# Patient Record
Sex: MALE | Race: BLACK OR AFRICAN AMERICAN | NOT HISPANIC OR LATINO | Employment: UNEMPLOYED | URBAN - METROPOLITAN AREA
[De-identification: names, ages, dates, MRNs, and addresses within clinical notes are randomized per-mention and may not be internally consistent; named-entity substitution may affect disease eponyms.]

---

## 2017-11-24 ENCOUNTER — HOSPITAL ENCOUNTER (EMERGENCY)
Facility: HOSPITAL | Age: 37
Discharge: HOME | End: 2017-11-24
Attending: FAMILY MEDICINE

## 2017-11-24 VITALS
TEMPERATURE: 98 F | DIASTOLIC BLOOD PRESSURE: 97 MMHG | HEIGHT: 69 IN | HEART RATE: 98 BPM | OXYGEN SATURATION: 97 % | RESPIRATION RATE: 16 BRPM | BODY MASS INDEX: 31.1 KG/M2 | WEIGHT: 210 LBS | SYSTOLIC BLOOD PRESSURE: 140 MMHG

## 2017-11-24 DIAGNOSIS — M79.652 LEFT THIGH PAIN: Primary | ICD-10-CM

## 2017-11-24 DIAGNOSIS — M79.606 LEG PAIN: ICD-10-CM

## 2017-11-24 LAB
ALBUMIN SERPL BCP-MCNC: 4.3 G/DL
ALP SERPL-CCNC: 67 U/L
ALT SERPL W/O P-5'-P-CCNC: 40 U/L
AMPHET+METHAMPHET UR QL: NEGATIVE
ANION GAP SERPL CALC-SCNC: 13 MMOL/L
APTT BLDCRRT: <21 SEC
AST SERPL-CCNC: 59 U/L
BACTERIA #/AREA URNS AUTO: ABNORMAL /HPF
BARBITURATES UR QL SCN>200 NG/ML: NEGATIVE
BASOPHILS # BLD AUTO: 0.04 K/UL
BASOPHILS NFR BLD: 1.2 %
BENZODIAZ UR QL SCN>200 NG/ML: NEGATIVE
BILIRUB SERPL-MCNC: 1.3 MG/DL
BILIRUB UR QL STRIP: NEGATIVE
BUN SERPL-MCNC: 13 MG/DL
BZE UR QL SCN: NEGATIVE
CALCIUM SERPL-MCNC: 8.8 MG/DL
CANNABINOIDS UR QL SCN: NEGATIVE
CHLORIDE SERPL-SCNC: 112 MMOL/L
CLARITY UR REFRACT.AUTO: CLEAR
CO2 SERPL-SCNC: 23 MMOL/L
COLOR UR AUTO: YELLOW
CREAT SERPL-MCNC: 0.96 MG/DL
CREAT UR-MCNC: 312.8 MG/DL
D DIMER PPP FEU-MCNC: NORMAL NG/ML
DIFFERENTIAL METHOD: ABNORMAL
EOSINOPHIL # BLD AUTO: 0.1 K/UL
EOSINOPHIL NFR BLD: 3.9 %
ERYTHROCYTE [DISTWIDTH] IN BLOOD BY AUTOMATED COUNT: 13.5 %
EST. GFR  (AFRICAN AMERICAN): >60 ML/MIN/1.73 M^2
EST. GFR  (NON AFRICAN AMERICAN): >60 ML/MIN/1.73 M^2
GLUCOSE SERPL-MCNC: 87 MG/DL
GLUCOSE UR QL STRIP: NEGATIVE
HCT VFR BLD AUTO: 44.2 %
HGB BLD-MCNC: 14.8 G/DL
HGB UR QL STRIP: NEGATIVE
HYALINE CASTS UR QL AUTO: 0 /LPF
INR PPP: 1
KETONES UR QL STRIP: NEGATIVE
LEUKOCYTE ESTERASE UR QL STRIP: NEGATIVE
LYMPHOCYTES # BLD AUTO: 2.1 K/UL
LYMPHOCYTES NFR BLD: 62.7 %
MCH RBC QN AUTO: 31.5 PG
MCHC RBC AUTO-ENTMCNC: 33.5 G/DL
MCV RBC AUTO: 94 FL
METHADONE UR QL SCN>300 NG/ML: NEGATIVE
MICROSCOPIC COMMENT: ABNORMAL
MONOCYTES # BLD AUTO: 0.3 K/UL
MONOCYTES NFR BLD: 8.1 %
NEUTROPHILS # BLD AUTO: 0.8 K/UL
NEUTROPHILS NFR BLD: 23.5 %
NITRITE UR QL STRIP: NEGATIVE
OPIATES UR QL SCN: NEGATIVE
PCP UR QL SCN>25 NG/ML: NEGATIVE
PH UR STRIP: 5 [PH] (ref 5–8)
PLATELET # BLD AUTO: 35 K/UL
PLATELET BLD QL SMEAR: ABNORMAL
PMV BLD AUTO: 11.2 FL
POTASSIUM SERPL-SCNC: 5.5 MMOL/L
PROT SERPL-MCNC: 8 G/DL
PROT UR QL STRIP: ABNORMAL
PROTHROMBIN TIME: 11.3 SEC
RBC # BLD AUTO: 4.7 M/UL
RBC #/AREA URNS AUTO: 0 /HPF (ref 0–4)
SODIUM SERPL-SCNC: 148 MMOL/L
SP GR UR STRIP: 1.02 (ref 1–1.03)
TOXICOLOGY INFORMATION: NORMAL
URN SPEC COLLECT METH UR: ABNORMAL
UROBILINOGEN UR STRIP-ACNC: NEGATIVE EU/DL
WBC # BLD AUTO: 3.32 K/UL
WBC #/AREA URNS AUTO: 6 /HPF (ref 0–5)

## 2017-11-24 PROCEDURE — 85379 FIBRIN DEGRADATION QUANT: CPT

## 2017-11-24 PROCEDURE — 25000003 PHARM REV CODE 250: Performed by: FAMILY MEDICINE

## 2017-11-24 PROCEDURE — 85730 THROMBOPLASTIN TIME PARTIAL: CPT

## 2017-11-24 PROCEDURE — 85025 COMPLETE CBC W/AUTO DIFF WBC: CPT

## 2017-11-24 PROCEDURE — 81000 URINALYSIS NONAUTO W/SCOPE: CPT

## 2017-11-24 PROCEDURE — 99284 EMERGENCY DEPT VISIT MOD MDM: CPT

## 2017-11-24 PROCEDURE — 85610 PROTHROMBIN TIME: CPT

## 2017-11-24 PROCEDURE — 80053 COMPREHEN METABOLIC PANEL: CPT

## 2017-11-24 PROCEDURE — 80307 DRUG TEST PRSMV CHEM ANLYZR: CPT

## 2017-11-24 RX ORDER — KETOROLAC TROMETHAMINE 10 MG/1
10 TABLET, FILM COATED ORAL
Status: COMPLETED | OUTPATIENT
Start: 2017-11-24 | End: 2017-11-24

## 2017-11-24 RX ORDER — HYDROCODONE BITARTRATE AND ACETAMINOPHEN 5; 325 MG/1; MG/1
1 TABLET ORAL EVERY 8 HOURS PRN
Qty: 12 TABLET | Refills: 0 | Status: SHIPPED | OUTPATIENT
Start: 2017-11-24 | End: 2017-11-24 | Stop reason: ALTCHOICE

## 2017-11-24 RX ORDER — HYDROCODONE BITARTRATE AND ACETAMINOPHEN 10; 325 MG/1; MG/1
1 TABLET ORAL
Status: DISCONTINUED | OUTPATIENT
Start: 2017-11-24 | End: 2017-11-24 | Stop reason: HOSPADM

## 2017-11-24 RX ORDER — ACETAMINOPHEN AND CODEINE PHOSPHATE 300; 30 MG/1; MG/1
1 TABLET ORAL EVERY 6 HOURS PRN
Qty: 12 TABLET | Refills: 0 | Status: SHIPPED | OUTPATIENT
Start: 2017-11-24 | End: 2017-12-04

## 2017-11-24 RX ADMIN — KETOROLAC TROMETHAMINE 10 MG: 10 TABLET, FILM COATED ORAL at 05:11

## 2017-11-24 NOTE — ED PROVIDER NOTES
Encounter Date: 11/24/2017       History     Chief Complaint   Patient presents with    Leg Pain     left leg pain no injury. Pt states that symptoms have been going on x 1 month. He has a hx of blood clot and filter      Patient complains of left leg pain since about a month.  She denies any injury.  Pain is radiating down to his lower leg.  There is no swelling.  There is no erythema.  Patient claims that he has history of blood clots and he is placed on filter.  He has a primary care physician seen about 3 weeks ago.  History of blood pressure noncompliant to his medication.      The history is provided by the patient.     Review of patient's allergies indicates:  No Known Allergies  Past Medical History:   Diagnosis Date    DVT (deep venous thrombosis)     Hypertension      Past Surgical History:   Procedure Laterality Date    AORTIC ARCH REPAIR      DIAPHRAGM SURGERY       Family History   Problem Relation Age of Onset    Hypertension Mother     Diabetes Mother     Cancer Father      Social History   Substance Use Topics    Smoking status: Former Smoker     Packs/day: 0.50     Types: Cigarettes    Smokeless tobacco: Never Used    Alcohol use Yes      Comment: occ     Review of Systems   Constitutional: Negative for activity change, appetite change and fever.   HENT: Negative for congestion, ear pain, sinus pain and sore throat.    Eyes: Negative for pain, discharge and itching.   Respiratory: Negative for cough, choking, chest tightness, shortness of breath and wheezing.    Cardiovascular: Negative for chest pain.   Gastrointestinal: Negative for abdominal distention, abdominal pain, nausea and vomiting.   Genitourinary: Negative for flank pain and frequency.   Musculoskeletal: Negative for back pain, gait problem, neck pain and neck stiffness.   Skin: Negative for rash.   Neurological: Negative for dizziness, light-headedness, numbness and headaches.   Psychiatric/Behavioral: Negative for confusion.  The patient is not nervous/anxious.    All other systems reviewed and are negative.      Physical Exam     Initial Vitals [11/24/17 1423]   BP Pulse Resp Temp SpO2   (!) 145/109 108 18 98 °F (36.7 °C) 96 %      MAP       121         Physical Exam    Nursing note and vitals reviewed.  Constitutional: He appears well-developed and well-nourished.   HENT:   Head: Normocephalic.   Right Ear: External ear normal.   Left Ear: External ear normal.   Nose: Nose normal.   Mouth/Throat: Oropharynx is clear and moist.   Eyes: Conjunctivae and EOM are normal. Pupils are equal, round, and reactive to light.   Neck: Normal range of motion. Neck supple.   Cardiovascular: Normal rate, regular rhythm, normal heart sounds and intact distal pulses. Exam reveals no friction rub.    No murmur heard.  Pulmonary/Chest: Breath sounds normal. No respiratory distress. He has no wheezes. He has no rhonchi. He has no rales. He exhibits no tenderness.   Abdominal: Soft. Bowel sounds are normal. He exhibits no distension. There is no tenderness. There is no guarding.   Musculoskeletal: Normal range of motion.        Left upper leg: He exhibits tenderness. He exhibits no bony tenderness, no swelling, no edema, no deformity and no laceration.        Legs:  Neurological: He is alert and oriented to person, place, and time. He has normal strength and normal reflexes. He displays normal reflexes. No cranial nerve deficit or sensory deficit.   Skin: Skin is warm. Capillary refill takes less than 2 seconds. No rash noted.         ED Course   Procedures  Labs Reviewed - No data to display          Medical Decision Making:   Initial Assessment:   Patient presents with chronic pain in his left posterior thigh since last 1 month.  Patient says he has history of blood clots and has been treated in the past with filter.  He is worried if there are any clot in his leg.  Differential Diagnosis:   Musculoskeletal pain, sciatica, DVT, abscess.  ED  Management:  Patient pain looks like musculoskeletal.  This ultrasound did not show any acute DVT.  Patient is notified about these symptoms and is given pain medication and advised to follow up PCP for further pain if persist.                   ED Course      Clinical Impression:   The primary encounter diagnosis was Left thigh pain. A diagnosis of Leg pain was also pertinent to this visit.    Disposition:   Disposition: Discharged  Condition: Leonidas Samayoa MD  11/24/17 2596

## 2018-05-01 PROCEDURE — 99284 EMERGENCY DEPT VISIT MOD MDM: CPT | Mod: 25

## 2018-05-01 PROCEDURE — 93005 ELECTROCARDIOGRAM TRACING: CPT

## 2018-05-01 PROCEDURE — 99283 EMERGENCY DEPT VISIT LOW MDM: CPT | Mod: ,,, | Performed by: PHYSICIAN ASSISTANT

## 2018-05-01 PROCEDURE — 93010 ELECTROCARDIOGRAM REPORT: CPT | Mod: ,,, | Performed by: INTERNAL MEDICINE

## 2018-05-02 ENCOUNTER — HOSPITAL ENCOUNTER (EMERGENCY)
Facility: HOSPITAL | Age: 38
Discharge: HOME | End: 2018-05-02
Attending: EMERGENCY MEDICINE
Payer: MEDICAID

## 2018-05-02 VITALS
RESPIRATION RATE: 43 BRPM | HEIGHT: 68 IN | WEIGHT: 195 LBS | SYSTOLIC BLOOD PRESSURE: 136 MMHG | HEART RATE: 92 BPM | DIASTOLIC BLOOD PRESSURE: 88 MMHG | OXYGEN SATURATION: 97 % | BODY MASS INDEX: 29.55 KG/M2 | TEMPERATURE: 99 F

## 2018-05-02 DIAGNOSIS — M25.571 RIGHT ANKLE PAIN: ICD-10-CM

## 2018-05-02 DIAGNOSIS — M79.606 LEG PAIN: ICD-10-CM

## 2018-05-02 PROCEDURE — 25000003 PHARM REV CODE 250: Performed by: PHYSICIAN ASSISTANT

## 2018-05-02 RX ORDER — NAPROXEN 500 MG/1
500 TABLET ORAL
Status: COMPLETED | OUTPATIENT
Start: 2018-05-02 | End: 2018-05-02

## 2018-05-02 RX ORDER — NAPROXEN 500 MG/1
500 TABLET ORAL 2 TIMES DAILY WITH MEALS
Qty: 20 TABLET | Refills: 0 | Status: SHIPPED | OUTPATIENT
Start: 2018-05-02 | End: 2018-05-21 | Stop reason: SDUPTHER

## 2018-05-02 RX ADMIN — NAPROXEN 500 MG: 500 TABLET ORAL at 03:05
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
## 2018-05-02 NOTE — DISCHARGE INSTRUCTIONS
Follow-up as scheduled next week at [facility]  Use naproxen twice a day as needed for pain  Return to the emergency department for any new symptoms      Our goal in the emergency department is to always give you outstanding care and exceptional service. You may receive a survey by mail or e-mail in the next week regarding your experience in our ED. We would greatly appreciate your completing and returning the survey. Your feedback provides us with a way to recognize our staff who give very good care and it helps us learn how to improve when your experience was below our aspiration of excellence.

## 2018-05-07 ENCOUNTER — OFFICE VISIT (OUTPATIENT)
Dept: FAMILY MEDICINE | Facility: HOSPITAL | Age: 38
End: 2018-05-07
Attending: FAMILY MEDICINE
Payer: MEDICAID

## 2018-05-07 VITALS
DIASTOLIC BLOOD PRESSURE: 79 MMHG | WEIGHT: 186.94 LBS | HEART RATE: 91 BPM | SYSTOLIC BLOOD PRESSURE: 114 MMHG | BODY MASS INDEX: 27.69 KG/M2 | HEIGHT: 69 IN

## 2018-05-07 DIAGNOSIS — I71.9 PSEUDOANEURYSM OF AORTA: ICD-10-CM

## 2018-05-07 DIAGNOSIS — R47.01 EXPRESSIVE APHASIA: ICD-10-CM

## 2018-05-07 DIAGNOSIS — I63.9 CEREBROVASCULAR ACCIDENT (CVA), UNSPECIFIED MECHANISM: Primary | ICD-10-CM

## 2018-05-07 DIAGNOSIS — J30.9 ALLERGIC RHINITIS, UNSPECIFIED SEASONALITY, UNSPECIFIED TRIGGER: ICD-10-CM

## 2018-05-07 PROBLEM — I69.320 CVA, OLD, APHASIA: Status: ACTIVE | Noted: 2018-05-07

## 2018-05-07 PROBLEM — I71.20 THORACIC AORTIC ANEURYSM: Status: ACTIVE | Noted: 2018-05-07

## 2018-05-07 PROCEDURE — 99215 OFFICE O/P EST HI 40 MIN: CPT | Performed by: FAMILY MEDICINE

## 2018-05-07 RX ORDER — FOLIC ACID 1 MG/1
1 TABLET ORAL
COMMUNITY
Start: 2018-05-01 | End: 2018-05-21 | Stop reason: SDUPTHER

## 2018-05-07 RX ORDER — LISINOPRIL AND HYDROCHLOROTHIAZIDE 12.5; 2 MG/1; MG/1
TABLET ORAL
COMMUNITY
End: 2018-05-07

## 2018-05-07 RX ORDER — AMLODIPINE BESYLATE 5 MG/1
5 TABLET ORAL
COMMUNITY
Start: 2018-05-01 | End: 2018-05-21 | Stop reason: SDUPTHER

## 2018-05-07 RX ORDER — FLUTICASONE PROPIONATE 50 MCG
1 SPRAY, SUSPENSION (ML) NASAL DAILY
Qty: 1 BOTTLE | Refills: 0 | Status: SHIPPED | OUTPATIENT
Start: 2018-05-07 | End: 2019-03-15 | Stop reason: SDUPTHER

## 2018-05-07 RX ORDER — METOPROLOL TARTRATE 50 MG/1
50 TABLET ORAL 3 TIMES DAILY
COMMUNITY
Start: 2018-04-30 | End: 2018-05-21 | Stop reason: SDUPTHER

## 2018-05-07 RX ORDER — ASPIRIN 81 MG/1
81 TABLET ORAL
COMMUNITY
End: 2018-05-21 | Stop reason: SDUPTHER

## 2018-05-07 RX ORDER — ATORVASTATIN CALCIUM 80 MG/1
80 TABLET, FILM COATED ORAL
COMMUNITY
Start: 2018-04-30 | End: 2018-05-21 | Stop reason: SDUPTHER

## 2018-05-07 RX ORDER — CLOPIDOGREL BISULFATE 75 MG/1
75 TABLET ORAL
COMMUNITY
Start: 2018-05-01 | End: 2018-05-21 | Stop reason: SDUPTHER
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
## 2018-05-07 NOTE — PROGRESS NOTES
I have reviewed the notes, assessments, and/or procedures performed, I concur with her/his documentation of Noy Bourne.

## 2018-05-10 ENCOUNTER — CLINICAL SUPPORT (OUTPATIENT)
Dept: REHABILITATION | Facility: HOSPITAL | Age: 38
End: 2018-05-10
Attending: FAMILY MEDICINE
Payer: MEDICAID

## 2018-05-10 DIAGNOSIS — R47.01 APHASIA: ICD-10-CM

## 2018-05-10 PROCEDURE — 92523 SPEECH SOUND LANG COMPREHEN: CPT | Mod: PN

## 2018-05-10 NOTE — PLAN OF CARE
Outpatient Neurological Rehabilitation  Speech and Language Therapy Evaluation    Date: 5/10/2018   Start Time:  0800  Stop Time:  0845      UNTIMED  Procedure Min.   Eval of speech sound production with comprehension and expression 45 minutes         Total Minutes: 45  Total Untimed Units: 1  Charges Billed/# of units: 1    Visit #:  1  Visits Authorized: 50  Date of Evaluation:  5/10/18  Authorization Period: May 7, 2018 to December 31, 2018  Plan of Care Expiration:    5/10/18 to 7/10/18    History   Onset Date:  April 2018  Primary Diagnosis:  CVA  Treatment Diagnosis:    Encounter Diagnosis   Name Primary?    Aphasia       Referring Provider:  Dr. Ericka Rainey  Orders: speech therapy evaluate and treat  Current Medical History:  Noy Bourne  presents to the [facility] secondary to the diagnosis of CVA. He was found to have rupture of his pseudoaneurysm and underwent emergency surgery which was complicated by hypoxic respiratory failure and an acute stroke in April 2018 in [place], [place] where he was working. He was reportedly discharged on 4/30/18.    Past Medical History:   Past Medical History:   Diagnosis Date    DVT (deep venous thrombosis)     Hypertension      Precautions:  communication  Prior Therapy:  One previous speech therapy session reported in the hospital in [place], [place].  Environmental Concerns/Cultural/Spiritual/Developmental/Educational Needs: none at this time    Subjective   Pain:   0/10  Nutrition:  Regular and thin liquid diet  Prior Level of Function: Independent  Social History:  Pt lives with his wife in [place]. 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 Education level: 12th grade.   Signs of Abuse: No  Functional Deficits Leading to Referral/Nature of Injury:  Difficulty word finding, delayed processing  Patient Therapy Goals:  To improve word finding and communication    Objective   Auditory  "Comprehension:   Following simple 1 step directions with 50% acc    Reading Comprehension:   Able to read 1 out of 4 words presented for 25% acc.     Verbal Expression:   Repetition: 29% acc max A  Namin% acc  Automatic speech acts: max A unable to count to ten or recite/repeat days of the week. Pt counted 5, 10, 15, 20, 25, 30,35, 40, 45, 50.    Written Expression:   -Not addressed this session.     Cognition:   Behavior: alert and attentive to task; frustrated   Oriented to self and wife    Motor Speech/Fluency/Voice:    No significant dysarthria noted. No significant dysphonia noted. Fluency slightly impaired due to word finding difficulty. Pt and wife report speech and voice are at baseline.     Swallowing: Pt and wife deny coughing, throat clearing, changes in vocal quality while eating/drinking. Denies globus sensation. No significant concern at this time. Please note silent aspiration cannot be ruled out without an instrumental assessment.     Hearing: No hearing deficit reported.   ________________________________________________________________________________________________________    Attempted portions of the WAB. However, unable to complete due to severity and time constraint.  Western Aphasia Battery - Revised (WAB-R) was administered to evaluate the patient's receptive and expressive language function. The following results were revealed:  Yes/No questions: 48/60   Apraxia: 53/60    Note: Increased time to complete all activities. Pt often responds with "I don't know.  I don't understanding what you're saying."     Treatment   -Not addressed this session.     Assessment     Noy Bourne presents to [facility] s/p medical diagnosis of  CVA. Demonstrates impairments including limitations as described in the problem list. Positive prognostic factors include new onset and young age. Negative prognostic factors none.  He presents with moderate to severe receptive and " expressive aphasia c/b difficulty following simple 1 step commands, difficulty naming objects, difficulty answering yes/no questions, difficulty repeating words. Patient will benefit from skilled, outpatient neurological rehabilitation speech therapy.    Rehab Potential: good    Short Term Goals (4 weeks):   1. Pt will answer complex yes/no questions with 80% acc given min A to improve receptive language skills.   2. Pt will follow simple one step directions with 50% acc given min A to improve receptive language skills.   3. Pt will repeat one syllable words with 50% acc given min A.   4. Pt will name objects given binary choice with 50% acc given min A.   5. Pt will read common words with 50% acc given min A to improve auditory comprehension.  6. Pt will participate in automatic speech acts with 50% acc given min A.     Long Term Goals (8 weeks):   1. Pt will participate in yes/no questions, following commands, and reading words to improve receptive language skills for functionality in everyday environment.   2. Pt will participate in objects naming, automatic speech acts, and repetition to improve expressive language skills for functionality in everyday environment.     Education: POC was discussed with pt. Patient and family members expressed understanding.     Plan     Authorization Period: May 7, 2018 to December 31, 2018  Plan of Care Certification Period: 5/10/15 to 7/10/15    Recommended Treatment Plan:  Patient will participate in the [facility] program for speech therapy 3 times per week for 8 weeks to address his  Communication deficits, to educate patient and their family, and to participate in a home exercise program.    Other Recommendations: none at this time       Therapist's Name:   Yvonne Hedrick M.S.CCC-SLP  Speech Language Pathologist     Date: 5/10/2018     I certify the need for these services furnished under this plan of treatment and while under my  care.  ____________________________________ Physician/Referring Practitioner   Date of Signature

## 2018-05-14 ENCOUNTER — CLINICAL SUPPORT (OUTPATIENT)
Dept: REHABILITATION | Facility: HOSPITAL | Age: 38
End: 2018-05-14
Attending: FAMILY MEDICINE
Payer: MEDICAID

## 2018-05-14 DIAGNOSIS — R47.01 APHASIA: ICD-10-CM

## 2018-05-14 PROCEDURE — 92507 TX SP LANG VOICE COMM INDIV: CPT | Mod: PN

## 2018-05-15 NOTE — PROGRESS NOTES
Outpatient Neurological Rehabilitation   Speech and Language Therapy Daily Note  Date:  5/14/2018     Start Time:  1615  Stop Time:  1700    Name: Noy oBurne   MRN: [id number]     Therapy Diagnosis:   Encounter Diagnosis   Name Primary?    Aphasia    Physician: Ericka Rainey MD  Physician Orders: speech therapy evaluate and treat  Medical CVA    Visit #:  2  Visits Authorized: 50  Visits Cancelled: 0  Visits No Showed:0  Date of Evaluation:  5/10/18  Authorization Period: May 7, 2018 to December 31, 2018  Plan of Care Expiration:    5/10/18 to 7/10/18    G-CODE   2 /10      Precautions: Standard     History:   Current Medical History: Noy Bourne presents to the [facility] with moderately-markedly impaired language and cognitive skills secondary to the medical diagnosis of CVA.  He was found to have rupture of his pseudoaneurysm and underwent emergency surgery which was complicated by hypoxic respiratory failure and an acute stroke in April 2018 in [place], [place] where he was working at the time. He was reportedly discharged on 4/30/18 and traveled home to [place] the next day.    Subjective:   Pt reports:  Pt was compliant to home exercise program. Pt's wife reported they worked on the worksheets provided.  Pain Scale:  0/10 on VAS currently.   Pain Location: n/a    Objective:   TIMED  Procedure Min.                UNTIMED  Procedure Min.   Speech- Language- Voice Therapy    45 minutes           Total Minutes: 45  Total Timed Units: 0  Total Untimed Units: 1  Charges Billed/# of units: 1    Short Term Goals: (4 weeks) Current Progress:   1. Pt will answer complex yes/no questions with 80% acc given min A to improve receptive language skills.  -Simple yes/no questions x 13 with 100% acc  -Complex yes/no questions -Not addressed this session.      2. Pt will follow simple one step directions with 50% acc given min A to improve receptive language skills.  -1  step commands x 10 with 90% acc given 3 repetitions   3. Pt will repeat one syllable words with 50% acc given min A.  -repeating 1 syllable words x 20 with 80% acc Independently      4. Pt will name objects given binary choice with 50% acc given min A.  -named objects x 10 with 70% acc Independently (without binary choice); 90% acc min A (given a verbal cue)     5. Pt will read common words with 50% acc given min A to improve auditory comprehension. -read common words x 29 with 59% acc       6. Pt will participate in automatic speech acts with 50% acc given min A.    -counting 1-10 unable to repeat with cues  -days of the week -unable to repeat with cues       Additional:   -Read 3 word sentences x 5 with 20% acc Independently; 60% acc mod A  -Identified body parts by pointing x 12 with 42% acc Independently; 50% acc min A    Patient Education/Response:   Pt and wife educated on goals and plan of care and reported understanding.     Home Program: Matching objects to words worksheet and reading common words, [employer] workbooks, [employer]    Assessment:   Noy is progressing well towards his goals. Naming has improved already.  Slightly decreased time to find words noted. Improvement noted following simple 1 step directions. Improvement noted repeating words.  Current goals remain appropriate. Goals to be updated as necessary.     Pt prognosis is Good. Pt will continue to benefit from skilled outpatient speech and language therapy to address the deficits listed in the problem list on initial evaluation, provide pt/family education and to maximize pt's level of independence in the home and community environment.     Medical necessity is demonstrated by the following IMPAIRMENTS:  -severe receptive and expressive aphasia  -difficulty following simple 1 step commands  -difficulty naming objects  -difficulty answering yes/no questions  -difficulty repeating words  -difficulty expressing wants/needs  -difficulty  participating in conversation    Environmental Concerns/Cultural/Spiritual/Developmental/Educational Needs:  Pt's spiritual, cultural and educational needs considered and pt agreeable to plan of care and goals.    Plan:   Continue POC with focus on word finding and comprehension.   Updated POC due 7/10/18.    Yvonne Hedrick M.S.CCC-SLP  Speech Language Pathologist   5/14/2018

## 2018-05-16 ENCOUNTER — TELEPHONE (OUTPATIENT)
Dept: REHABILITATION | Facility: HOSPITAL | Age: 38
End: 2018-05-16

## 2018-05-17 ENCOUNTER — OFFICE VISIT (OUTPATIENT)
Dept: CARDIOLOGY | Facility: CLINIC | Age: 38
End: 2018-05-17
Payer: MEDICAID

## 2018-05-17 VITALS
HEART RATE: 71 BPM | SYSTOLIC BLOOD PRESSURE: 122 MMHG | WEIGHT: 189.94 LBS | OXYGEN SATURATION: 97 % | BODY MASS INDEX: 28.13 KG/M2 | HEIGHT: 69 IN | DIASTOLIC BLOOD PRESSURE: 92 MMHG

## 2018-05-17 DIAGNOSIS — Z86.718 HISTORY OF DVT OF LOWER EXTREMITY: ICD-10-CM

## 2018-05-17 DIAGNOSIS — I69.320 CVA, OLD, APHASIA: ICD-10-CM

## 2018-05-17 DIAGNOSIS — I71.9 PSEUDOANEURYSM OF AORTA: Primary | ICD-10-CM

## 2018-05-17 DIAGNOSIS — I10 ESSENTIAL HYPERTENSION: ICD-10-CM

## 2018-05-17 PROCEDURE — 99214 OFFICE O/P EST MOD 30 MIN: CPT | Mod: PBBFAC,PN | Performed by: INTERNAL MEDICINE

## 2018-05-17 PROCEDURE — 99205 OFFICE O/P NEW HI 60 MIN: CPT | Mod: S$PBB,,, | Performed by: INTERNAL MEDICINE

## 2018-05-17 PROCEDURE — 99999 PR PBB SHADOW E&M-EST. PATIENT-LVL IV: CPT | Mod: PBBFAC,,, | Performed by: INTERNAL MEDICINE

## 2018-05-17 NOTE — LETTER
May 18, 2018      Ericka Rainey MD  [address]  [facility] [address]           [facility]  [address]  [facility] [address]  Phone: [phone]  Fax: [phone]          Patient: Noy Bourne   MR Number: [id number]   YOB: 1980   Date of Visit: 5/17/2018       Dear Dr. Ericka Rainey:    Thank you for referring Noy Bourne to me for evaluation. Attached you will find relevant portions of my assessment and plan of care.    If you have questions, please do not hesitate to call me. I look forward to following Noy Bourne along with you.    Sincerely,    Anton Hickman MD PhD    Enclosure  CC:  No Recipients    If you would like to receive this communication electronically, please contact [email or web address] or [phone] to request more information on [email or web address] access.    For providers and/or their staff who would like to refer a patient to [facility], please contact us through our one-stop-shop provider referral line, [facility], at [phone].    If you feel you have received this communication in error or would no longer like to receive these types of communications, please e-mail [email or web address]
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
## 2018-05-21 ENCOUNTER — OFFICE VISIT (OUTPATIENT)
Dept: FAMILY MEDICINE | Facility: HOSPITAL | Age: 38
End: 2018-05-21
Attending: FAMILY MEDICINE
Payer: MEDICAID

## 2018-05-21 VITALS
DIASTOLIC BLOOD PRESSURE: 96 MMHG | HEIGHT: 69 IN | WEIGHT: 184.5 LBS | BODY MASS INDEX: 27.33 KG/M2 | SYSTOLIC BLOOD PRESSURE: 132 MMHG | HEART RATE: 70 BPM

## 2018-05-21 DIAGNOSIS — I63.9 CEREBROVASCULAR ACCIDENT (CVA), UNSPECIFIED MECHANISM: ICD-10-CM

## 2018-05-21 DIAGNOSIS — I10 HYPERTENSION, UNSPECIFIED TYPE: ICD-10-CM

## 2018-05-21 DIAGNOSIS — F32.A DEPRESSION, UNSPECIFIED DEPRESSION TYPE: ICD-10-CM

## 2018-05-21 DIAGNOSIS — F41.9 ANXIETY: Primary | ICD-10-CM

## 2018-05-21 DIAGNOSIS — M79.605 PAIN IN BOTH LOWER EXTREMITIES: ICD-10-CM

## 2018-05-21 DIAGNOSIS — M79.604 PAIN IN BOTH LOWER EXTREMITIES: ICD-10-CM

## 2018-05-21 DIAGNOSIS — I71.20 THORACIC AORTIC ANEURYSM WITHOUT RUPTURE: ICD-10-CM

## 2018-05-21 PROCEDURE — 99213 OFFICE O/P EST LOW 20 MIN: CPT | Performed by: FAMILY MEDICINE

## 2018-05-21 RX ORDER — ASPIRIN 81 MG/1
81 TABLET ORAL DAILY
Qty: 90 TABLET | Refills: 1 | Status: SHIPPED | OUTPATIENT
Start: 2018-05-21 | End: 2020-05-25 | Stop reason: SDUPTHER

## 2018-05-21 RX ORDER — CLOPIDOGREL BISULFATE 75 MG/1
75 TABLET ORAL DAILY
Qty: 90 TABLET | Refills: 0 | Status: SHIPPED | OUTPATIENT
Start: 2018-05-21 | End: 2018-09-21 | Stop reason: SDUPTHER

## 2018-05-21 RX ORDER — AMLODIPINE BESYLATE 5 MG/1
5 TABLET ORAL DAILY
Qty: 90 TABLET | Refills: 1 | Status: SHIPPED | OUTPATIENT
Start: 2018-05-21 | End: 2018-06-18 | Stop reason: ALTCHOICE

## 2018-05-21 RX ORDER — FOLIC ACID 1 MG/1
1 TABLET ORAL DAILY
Qty: 90 TABLET | Refills: 0 | Status: SHIPPED | OUTPATIENT
Start: 2018-05-21 | End: 2018-08-09 | Stop reason: SDUPTHER

## 2018-05-21 RX ORDER — ATORVASTATIN CALCIUM 80 MG/1
80 TABLET, FILM COATED ORAL DAILY
Qty: 90 TABLET | Refills: 0 | Status: SHIPPED | OUTPATIENT
Start: 2018-05-21 | End: 2018-09-21 | Stop reason: SDUPTHER

## 2018-05-21 RX ORDER — ESCITALOPRAM OXALATE 10 MG/1
10 TABLET ORAL DAILY
Qty: 30 TABLET | Refills: 0 | Status: SHIPPED | OUTPATIENT
Start: 2018-05-21 | End: 2019-03-15 | Stop reason: SDUPTHER

## 2018-05-21 RX ORDER — NAPROXEN 500 MG/1
500 TABLET ORAL DAILY PRN
Qty: 30 TABLET | Refills: 0 | Status: SHIPPED | OUTPATIENT
Start: 2018-05-21 | End: 2021-05-12

## 2018-05-21 RX ORDER — METOPROLOL TARTRATE 50 MG/1
50 TABLET ORAL 3 TIMES DAILY
Qty: 270 TABLET | Refills: 0 | Status: SHIPPED | OUTPATIENT
Start: 2018-05-21 | End: 2018-06-18 | Stop reason: ALTCHOICE

## 2018-05-21 NOTE — PROGRESS NOTES
I assume primary medical responsibility for this patient, I have reviewed the case history, findings, diagnosis and treatment plan with the resident and agree that the care is reasonable and necessary. This service has been performed by a resident without the presence of a teaching physician under the primary care exception  Marquita Rodgers  5/21/2018
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
## 2018-05-23 PROBLEM — Z86.718 HISTORY OF DVT OF LOWER EXTREMITY: Status: ACTIVE | Noted: 2018-05-23

## 2018-05-23 PROBLEM — I10 ESSENTIAL HYPERTENSION: Status: ACTIVE | Noted: 2018-05-23

## 2018-05-24 ENCOUNTER — CLINICAL SUPPORT (OUTPATIENT)
Dept: REHABILITATION | Facility: HOSPITAL | Age: 38
End: 2018-05-24
Attending: FAMILY MEDICINE
Payer: MEDICAID

## 2018-05-24 DIAGNOSIS — R47.01 APHASIA: ICD-10-CM

## 2018-05-24 PROCEDURE — 92507 TX SP LANG VOICE COMM INDIV: CPT | Mod: PN

## 2018-05-24 NOTE — PROGRESS NOTES
Outpatient Neurological Rehabilitation   Speech and Language Therapy Daily Note  Date:  5/24/2018     Start Time:  1615  Stop Time:  1700    Name: Noy Bourne   MRN: [id number]     Therapy Diagnosis:   Encounter Diagnosis   Name Primary?    Aphasia    Physician: Ericka Rainey MD  Physician Orders: speech therapy evaluate and treat  Medical CVA    Visit #:  3  Visits Authorized: 50  Visits Cancelled: 0  Visits No Showed:0  Date of Evaluation:  5/10/18  Authorization Period: May 7, 2018 to December 31, 2018  Plan of Care Expiration:    5/10/18 to 7/10/18    G-CODE   3 /10      Precautions: Standard     History:   Current Medical History: Noy Bourne presents to the [facility] with moderately-markedly impaired language and cognitive skills secondary to the medical diagnosis of CVA.  He was found to have rupture of his pseudoaneurysm and underwent emergency surgery which was complicated by hypoxic respiratory failure and an acute stroke in April 2018 in [place], [place] where he was working at the time. He was reportedly discharged on 4/30/18 and traveled home to [place] the next day.    Subjective:   Pt reports:  Pt was compliant to home exercise program. Pt and wife reported completing some of home exercise program.   Pain Scale:  0/10 on VAS currently.   Pain Location: n/a    Objective:   TIMED  Procedure Min.                UNTIMED  Procedure Min.   Speech- Language- Voice Therapy    45 minutes           Total Minutes: 45  Total Timed Units: 0  Total Untimed Units: 1  Charges Billed/# of units: 1    Short Term Goals: (4 weeks) Current Progress:   1. Pt will answer complex yes/no questions with 80% acc given min A to improve receptive language skills.  -Simple yes/no questions with min A  -Complex yes/no questions x 5 with max 60% acc      2. Pt will follow simple one step directions with 50% acc given min A to improve receptive language skills.  -1 step commands  x 10 with 90% acc given 1 repetitions on one occurrence; improvement noted   3. Pt will repeat one syllable words with 50% acc given min A.  -repeating 1 syllable words x 30 with 67% acc Independently; 80% acc min A     4. Pt will name objects given binary choice with 50% acc given min A.  -named objects x 20 with 50% acc Independently (without binary choice); 85% acc min A      5. Pt will read common words with 50% acc given min A to improve auditory comprehension. -read common words x 25 with 56% acc Independently; 64% acc min A       6. Pt will participate in automatic speech acts with 50% acc given min A.    -counting 1-10 Independently; significantly improved  -days of the week -able to repeat 3 out of 7       Additional:   -Read 3 word sentences x 12 with 50% acc Independently; 42% acc mod A  -Identified body parts by pointing x 12 with 50% acc Independently; 58% acc min A    Patient Education/Response:   Pt and wife educated on goals and plan of care and reported understanding.     Home Program: Matching objects to words worksheet and reading common words, [employer] workbooks, [employer]    Assessment:   Noy is progressing well towards his goals. Significantly improved counting one to ten. Naming continues to improve. Increase identifying body parts. Improvement noted following simple 1 step directions with less repetitions; quick responses. Current goals remain appropriate. Goals to be updated as necessary.     Pt prognosis is Good. Pt will continue to benefit from skilled outpatient speech and language therapy to address the deficits listed in the problem list on initial evaluation, provide pt/family education and to maximize pt's level of independence in the home and community environment.     Medical necessity is demonstrated by the following IMPAIRMENTS:  -severe receptive and expressive aphasia  -difficulty following simple 1 step commands  -difficulty naming objects  -difficulty answering yes/no  questions  -difficulty repeating words  -difficulty expressing wants/needs  -difficulty participating in conversation    Environmental Concerns/Cultural/Spiritual/Developmental/Educational Needs:  Pt's spiritual, cultural and educational needs considered and pt agreeable to plan of care and goals.    Plan:   Continue POC with focus on word finding and comprehension.   Updated POC due 7/10/18.    Yvonne Hedrick M.S.CCC-SLP  Speech Language Pathologist   5/24/2018

## 2018-05-28 ENCOUNTER — CLINICAL SUPPORT (OUTPATIENT)
Dept: REHABILITATION | Facility: HOSPITAL | Age: 38
End: 2018-05-28
Attending: FAMILY MEDICINE
Payer: MEDICAID

## 2018-05-28 DIAGNOSIS — R47.01 APHASIA: ICD-10-CM

## 2018-05-28 PROCEDURE — 92507 TX SP LANG VOICE COMM INDIV: CPT | Mod: PN

## 2018-05-28 NOTE — PROGRESS NOTES
Outpatient Neurological Rehabilitation   Speech and Language Therapy Daily Note  Date:  5/28/2018     Start Time:  0845  Stop Time:  0930    Name: Noy Bourne   MRN: [id number]     Therapy Diagnosis:   Encounter Diagnosis   Name Primary?    Aphasia    Physician: Ericka Rainey MD  Physician Orders: speech therapy evaluate and treat  Medical CVA    Visit #:  4  Visits Authorized: 50  Visits Cancelled: 0  Visits No Showed:0  Date of Evaluation:  5/10/18  Authorization Period: May 7, 2018 to December 31, 2018  Plan of Care Expiration:    5/10/18 to 7/10/18    G-CODE   4 /10      Precautions: Standard     History:   Current Medical History: Noy Bourne presents to the [facility] with moderately-markedly impaired language and cognitive skills secondary to the medical diagnosis of CVA.  He was found to have rupture of his pseudoaneurysm and underwent emergency surgery which was complicated by hypoxic respiratory failure and an acute stroke in April 2018 in [place], [place] where he was working at the time. He was reportedly discharged on 4/30/18 and traveled home to [place] the next day.    Subjective:   Pt reports:  Pt was compliant to home exercise program. Pt and wife reported completing some of home exercise program.   Pain Scale:  0/10 on VAS currently.   Pain Location: n/a    Objective:   TIMED  Procedure Min.                UNTIMED  Procedure Min.   Speech- Language- Voice Therapy    45 minutes           Total Minutes: 45  Total Timed Units: 0  Total Untimed Units: 1  Charges Billed/# of units: 1    Short Term Goals: (4 weeks) Current Progress:   1. Pt will answer complex yes/no questions with 80% acc given min A to improve receptive language skills.  -Simple yes/no questions with min A  -Complex yes/no questions -Not addressed this session.       2. Pt will follow simple one step directions with 50% acc given min A to improve receptive language skills.  -1 step  commands x 10 with 83% acc Independently; 92% acc min A   3. Pt will repeat one syllable words with 50% acc given min A.  -repeating 1 syllable words x 20 with 75% acc Independently; 85% acc min A  -blends difficult such as /sh/     4. Pt will name objects given binary choice with 50% acc given min A.  -named objects x 20 with 65% acc Independently (without binary choice); 85% acc min A      5. Pt will read common words with 50% acc given min A to improve auditory comprehension. -read common 1 syllable words x 21 with 86% acc Independently; 95% acc min A  -2 syllable words x 6 with 83% acc Independently      6. Pt will participate in automatic speech acts with 50% acc given min A.    -counting 1-20 Independently; significantly improved  -days of the week 7/7 given initial word cue     7. Pt will read 3 word sentences with 80% acc given min A.    -read 3 word sentences x 5 with 20% acc Independently; 80% acc min A  -read 4 word sentences x 5 with 0% acc Independently; 100% acc mod A     8. Pt will identify body parts by pointing with 80% acc given min A.    -x 12 with 67% acc given min A  -x 12 with 83% acc given min A     Additional:   -Orientation x 13 with 31% acc Independently; 69% acc min A  -Wrote words x 6 with 17% acc Independently; 50% acc mod A    Patient Education/Response:   Pt and wife educated on goals and plan of care and reported understanding.     Home Program: Matching objects to words worksheet and reading common words, [employer] workbooks, [employer]    Assessment:   Noy is progressing well towards his goals. Significantly improved counting one to ten. Naming continues to improve. Increase repeating 1 syllable words. Increase identifying body parts. Improvement noted following simple 1 step directions with less repetitions; quick responses. Increase reading common 1 syllable words.  Current goals remain appropriate. Goals to be updated as necessary.     Pt prognosis is Good. Pt will  continue to benefit from skilled outpatient speech and language therapy to address the deficits listed in the problem list on initial evaluation, provide pt/family education and to maximize pt's level of independence in the home and community environment.     Medical necessity is demonstrated by the following IMPAIRMENTS:  -severe receptive and expressive aphasia  -difficulty following simple 1 step commands  -difficulty naming objects  -difficulty answering yes/no questions  -difficulty repeating words  -difficulty expressing wants/needs  -difficulty participating in conversation    Environmental Concerns/Cultural/Spiritual/Developmental/Educational Needs:  Pt's spiritual, cultural and educational needs considered and pt agreeable to plan of care and goals.    Plan:   Continue POC with focus on word finding and comprehension.   Updated POC due 7/10/18.    Yvonne Hedrick M.S.CCC-SLP  Speech Language Pathologist   5/28/2018

## 2018-05-30 ENCOUNTER — CLINICAL SUPPORT (OUTPATIENT)
Dept: REHABILITATION | Facility: HOSPITAL | Age: 38
End: 2018-05-30
Attending: FAMILY MEDICINE
Payer: MEDICAID

## 2018-05-30 DIAGNOSIS — R47.01 APHASIA: ICD-10-CM

## 2018-05-30 PROCEDURE — 92507 TX SP LANG VOICE COMM INDIV: CPT | Mod: PN

## 2018-05-30 NOTE — PROGRESS NOTES
Outpatient Neurological Rehabilitation   Speech and Language Therapy Daily Note  Date:  5/30/2018     Start Time:  0855  Stop Time:  0930    Name: Noy Bourne   MRN: [id number]     Therapy Diagnosis:   Encounter Diagnosis   Name Primary?    Aphasia    Physician: Ericka Rainey MD  Physician Orders: speech therapy evaluate and treat  Medical CVA    Visit #:  5  Visits Authorized: 50  Visits Cancelled: 0  Visits No Showed:0  Date of Evaluation:  5/10/18  Authorization Period: May 7, 2018 to December 31, 2018  Plan of Care Expiration:    5/10/18 to 7/10/18    G-CODE   5 /10      Precautions: Standard     History:   Current Medical History: Noy Bourne presents to the [facility] with moderately-markedly impaired language and cognitive skills secondary to the medical diagnosis of CVA.  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 He was reportedly discharged on 4/30/18 and traveled home to [place] the next day.    Subjective:   Pt reports:  Pt was partially compliant to home exercise program.   Pain Scale:  0/10 on VAS currently.   Pain Location: n/a    Objective:   TIMED  Procedure Min.                UNTIMED  Procedure Min.   Speech- Language- Voice Therapy    35 minutes             Total Minutes: 35  Total Timed Units: 0  Total Untimed Units: 1  Charges Billed/# of units: 1    Short Term Goals: (4 weeks) Current Progress:   1. Pt will answer complex yes/no questions with 80% acc given min A to improve receptive language skills.  -Simple yes/no questions with min A  -Complex yes/no questions -Not addressed this session.       2. Pt will follow simple one step directions with 50% acc given min A to improve receptive language skills.  --Not addressed this session.   GOAL MET x 1 session      3. Pt will repeat one syllable words with 50% acc given min A.  -repeating 1 syllable words x 20 with 85% acc min A; 100% acc mod A  -blends difficult such as /sh/     4. Pt will name objects given binary choice with 50% acc given min A.  -named objects x 20 with 40% acc Independently (without binary choice); 90% acc min A      5. Pt will read common words with 50% acc given min A to improve auditory comprehension. -read common 1 syllable words x 21 with 86% acc Independently  -2 syllable words x 6 with 83% acc Independently      6. Pt will participate in automatic speech acts with 50% acc given min A.    -counting 1-20 Independently; significantly improved  -days of the week 7/7 given initial word cue  -months 11/12 min A     7. Pt will read 3 word sentences with 80% acc given min A.    -read 3 word sentences x 10 with 70% acc Independently; 80% acc min A  -read 4 word sentences -Not addressed this session.      8. Pt will identify body parts by pointing with 80% acc given min A.    -x 12 with 58% acc given min A  -x 12 with 75% acc given min A     Additional:   -Written orientation given visual with min A.  -Wrote words -Not addressed this session.     Patient Education/Response:   Pt and wife educated on goals and plan of care and reported understanding.     Home Program: Matching objects to words worksheet and reading common words, [employer] workbooks, [employer]    Assessment:   Noy is progressing well towards his goals. Significantly improved counting one to ten. Decreased accuracy naming objects Independently. Increase repeating 1 syllable words. Increase identifying body parts.  Consistent accuracy reading common 1 syllable words. Increase reading 3 word sentences. Decrease accuracy naming body parts. Current goals remain appropriate. Goals to be updated as necessary.     Pt prognosis is Good. Pt will continue to benefit from skilled outpatient speech and language therapy to address the deficits  listed in the problem list on initial evaluation, provide pt/family education and to maximize pt's level of independence in the home and community environment.     Medical necessity is demonstrated by the following IMPAIRMENTS:  -severe receptive and expressive aphasia  -difficulty following simple 1 step commands  -difficulty naming objects  -difficulty answering yes/no questions  -difficulty repeating words  -difficulty expressing wants/needs  -difficulty participating in conversation    Environmental Concerns/Cultural/Spiritual/Developmental/Educational Needs:  Pt's spiritual, cultural and educational needs considered and pt agreeable to plan of care and goals.    Plan:   Continue POC with focus on word finding and comprehension.   Updated POC due 7/10/18.    Yvonne Hedrick M.S.CCC-SLP  Speech Language Pathologist   5/30/2018

## 2018-06-06 ENCOUNTER — CLINICAL SUPPORT (OUTPATIENT)
Dept: REHABILITATION | Facility: HOSPITAL | Age: 38
End: 2018-06-06
Attending: FAMILY MEDICINE
Payer: MEDICAID

## 2018-06-06 ENCOUNTER — TELEPHONE (OUTPATIENT)
Dept: CARDIOLOGY | Facility: CLINIC | Age: 38
End: 2018-06-06

## 2018-06-06 DIAGNOSIS — R47.01 APHASIA: ICD-10-CM

## 2018-06-06 PROCEDURE — 92507 TX SP LANG VOICE COMM INDIV: CPT | Mod: PN

## 2018-06-06 NOTE — PROGRESS NOTES
Outpatient Neurological Rehabilitation   Speech and Language Therapy Daily Note  Date:  6/6/2018     Start Time:  0900  Stop Time:  0930    Name: Noy Bourne   MRN: [id number]     Therapy Diagnosis:   Encounter Diagnosis   Name Primary?    Aphasia    Physician: Ericka Rainey MD  Physician Orders: speech therapy evaluate and treat  Medical CVA    Visit #:  6  Visits Authorized: 50  Visits Cancelled: 0  Visits No Showed:0  Date of Evaluation:  5/10/18  Authorization Period: May 7, 2018 to December 31, 2018  Plan of Care Expiration:    5/10/18 to 7/10/18    G-CODE   6 /10      Precautions: Standard     History:   Current Medical History: Noy Bourne presents to the [facility] with moderately-markedly impaired language and cognitive skills secondary to the medical diagnosis of CVA.  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 He was reportedly discharged on 4/30/18 and traveled home to [place] the next day.    Subjective:   Pt reports:  Pt pleasant.    Pain Scale:  0/10 on VAS currently.   Pain Location: n/a    Objective:   TIMED  Procedure Min.                UNTIMED  Procedure Min.   Speech- Language- Voice Therapy    30 minutes             Total Minutes: 30  Total Timed Units: 0  Total Untimed Units: 1  Charges Billed/# of units: 1    Short Term Goals: (4 weeks) Current Progress:   1. Pt will answer complex yes/no questions with 80% acc given min A to improve receptive language skills.  -Moderate yes/no questions X 10 with 20% acc Independently; 70% min A  -Complex yes/no questions -Not addressed this session.       2. Pt will follow simple one step directions with 50% acc given min A to improve receptive language skills. GOAL MET 6/6/18 Follow simple one step directions X 10 with 70%  acc independently; 100% acc min A  GOAL MET x 2 session     3. Pt will repeat one syllable words with 50% acc given min A.  -repeating 1 syllable words x 20 with 90% independently; 95% acc min A     4. Pt will name objects given binary choice with 50% acc given min A.  -Not addressed this session     5. Pt will read common one-syllable words with 50% acc given min A to improve auditory comprehension. GOAL MET 6/6/18 -read common 1 syllable words x 20 with 85% acc Independently; 95% min A.  --GOAL MET X2 sessions     6. Pt will participate in automatic speech acts with 50% acc given min A.    -counting 1-20 Independently; significantly improved  -days of the week 7/7 given initial cue  -months 12/12 min A given two initial cue     7. Pt will read 3 word sentences with 80% acc given min A.    -read 3 word sentences x 11 with 18% acc Independently; 91% acc min A  -read 4 word sentences -Not addressed this session.      8. Pt will identify body parts by pointing with 80% acc given min A.    -x 12 with 75% acc independently   9. Pt will follow simple one-step directions with 90% acc independently to improve receptive language skills   New goal   10. Pt will read two-syllable words with 50% acc given min A to improve auditory comprehension New goal   Orientation X 9 with 44% acc independently; 89% with mod A.     Additional:   -Written orientation given visual with min A.- -Not addressed this session.  -Wrote words -Not addressed this session.     Goals Met:  2. Pt will follow simple one step directions with 50% acc given min A to improve receptive language skills. GOAL MET 6/6/18  5. Pt will read common words with 50% acc given min A to improve auditory comprehension. GOAL MET 6/6/18    Patient Education/Response:   Pt and wife educated on goals and plan of care and reported understanding.     Home Program: Matching objects to words worksheet and reading common words, [employer] workbooks, [employer]    Assessment:  "  Noy is progressing well towards his goals. Increase in repeating 1 syllable words. Decrease reading 3-word sentences independently.Pt met 2 goals for following simple one-step commands with min A and reading common word independently. Two new goals established. Current goals remain appropriate. Goals to be updated as necessary.     Pt prognosis is Good. Pt will continue to benefit from skilled outpatient speech and language therapy to address the deficits listed in the problem list on initial evaluation, provide pt/family education and to maximize pt's level of independence in the home and community environment.     Medical necessity is demonstrated by the following IMPAIRMENTS:  -severe receptive and expressive aphasia  -difficulty following simple 1 step commands  -difficulty naming objects  -difficulty answering yes/no questions  -difficulty repeating words  -difficulty expressing wants/needs  -difficulty participating in conversation    Environmental Concerns/Cultural/Spiritual/Developmental/Educational Needs:  Pt's spiritual, cultural and educational needs considered and pt agreeable to plan of care and goals.    Plan:   Continue POC with focus on word finding and comprehension.   Updated POC due 7/10/18.    Claudette Snyder,  Clinician    "I certify that I was present in the room directing the student and service delivery and guiding them using my skilled judgement. As the co-signing therapist, I have reviewed the student's documentation, and am responsible for the treatment, assessment and plan."     Yvonne Hedrick M.S.CCC-SLP  Speech Language Pathologist   6/6/2018   "

## 2018-06-06 NOTE — TELEPHONE ENCOUNTER
----- Message from [place] Phan sent at 6/6/2018 12:00 PM CDT -----  Contact: [phone]/wife/Madina  Patient's wife is requesting a call back. She has his ECHO results from [facility]. Does he still need to have new test? Please advise.

## 2018-06-13 ENCOUNTER — CLINICAL SUPPORT (OUTPATIENT)
Dept: REHABILITATION | Facility: HOSPITAL | Age: 38
End: 2018-06-13
Attending: FAMILY MEDICINE
Payer: MEDICAID

## 2018-06-13 DIAGNOSIS — R47.01 APHASIA: ICD-10-CM

## 2018-06-13 PROCEDURE — 92507 TX SP LANG VOICE COMM INDIV: CPT | Mod: PN

## 2018-06-13 NOTE — PROGRESS NOTES
Outpatient Neurological Rehabilitation   Speech and Language Therapy Daily Note  Date:  6/13/2018     Start Time:  0905  Stop Time:  0950    Name: Noy Bourne   MRN: [id number]     Therapy Diagnosis:   Encounter Diagnosis   Name Primary?    Aphasia    Physician: Ericka Rainey MD  Physician Orders: speech therapy evaluate and treat  Medical CVA    Visit #:  7  Visits Authorized: 50  Date of Evaluation:  5/10/18  Authorization Period: May 7, 2018 to December 31, 2018  Plan of Care Expiration:    5/10/18 to 7/10/18    G-CODE   7 /10      Precautions: Standard     History:   Current Medical History: Noy Bourne presents to the [facility] with moderately-markedly impaired language and cognitive skills secondary to the medical diagnosis of CVA.  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 He was reportedly discharged on 4/30/18 and traveled home to [place] the next day.    Subjective:   Pt reports:  Pt pleasant.    Pain Scale:  0/10 on VAS currently.   Pain Location: n/a    Objective:   TIMED  Procedure Min.                UNTIMED  Procedure Min.   Speech- Language- Voice Therapy    45 minutes             Total Minutes: 30  Total Timed Units: 0  Total Untimed Units: 1  Charges Billed/# of units: 1    Short Term Goals: (4 weeks) Current Progress:   1. Pt will answer complex yes/no questions with 80% acc given min A to improve receptive language skills.  -Moderate yes/no questions X 10 with 70% acc Independently  -Complex yes/no questions -Not addressed this session.       3. Pt will repeat one syllable words with 50% acc given min A. GOAL MET 6/13/18 -repeating 1 syllable words x 20 with 65% independently; 95% acc min A  GOAL MET x 2 sessions     4. Pt will name objects with 50% acc given min  A.  -60% acc Independently; 90% acc min A     6. Pt will participate in automatic speech acts with 50% acc given min A.   GOAL MET 6/13/18 -counting 1-20 Independently; significantly improved  -days of the week 7/7 given one initial cue  -months 12/12 min A given one initial cue     7. Pt will read 3 word sentences with 80% acc given min A.    -read 2-3 word sentences x 4 with 25% acc Independently; 100% acc min A       8. Pt will identify body parts by pointing with 80% acc given min A.    -x 20 with 55% acc independently; 80% acc min A   9. Pt will follow simple one-step directions with 90% acc independently to improve receptive language skills   -X 20 with 85% acc Independently with 2 repetitions   10. Pt will read two-syllable words with 50% acc given min A to improve auditory comprehension.   -X 15 with 53% acc Independently; 60% acc min A   11. Pt will repeat one syllable words with 70% acc Independently.   New goal       Goals Met:  2. Pt will follow simple one step directions with 50% acc given min A to improve receptive language skills. GOAL MET 6/6/18  5. Pt will read common one-syllable words with 50% acc given min A to improve auditory comprehension. GOAL MET 6/6/18  3. Pt will repeat one syllable words with 50% acc given min A. GOAL MET 6/13/18  4. Pt will name objects given binary choice with 50% acc given min A. GOAL MET  6. Pt will participate in automatic speech acts with 50% acc given min A.   GOAL MET 6/13/18    Patient Education/Response:   Pt and wife educated on goals and plan of care and reported understanding.     Home Program: Matching objects to words worksheet and reading multi-syllabic words, and short sentences [employer] workbooks, [employer]    Assessment:   Noy is progressing well towards his goals. Improvement noted answering moderate yes/no questions. Goal met for repeating one-syllable words. Goal met for automatic speech acts. Decrease naming body parts independently. One  "new goal established. Current goals remain appropriate. Goals to be updated as necessary.     Pt prognosis is Good. Pt will continue to benefit from skilled outpatient speech and language therapy to address the deficits listed in the problem list on initial evaluation, provide pt/family education and to maximize pt's level of independence in the home and community environment.     Medical necessity is demonstrated by the following IMPAIRMENTS:  -severe receptive and expressive aphasia  -difficulty following simple 1 step commands  -difficulty naming objects  -difficulty answering yes/no questions  -difficulty repeating words  -difficulty expressing wants/needs  -difficulty participating in conversation    Environmental Concerns/Cultural/Spiritual/Developmental/Educational Needs:  Pt's spiritual, cultural and educational needs considered and pt agreeable to plan of care and goals.    Plan:   Continue POC with focus on word finding and comprehension.   Updated POC due 7/10/18.    Claudette Snyder,  Clinician    "I certify that I was present in the room directing the student and service delivery and guiding them using my skilled judgement. As the co-signing therapist, I have reviewed the student's documentation, and am responsible for the treatment, assessment and plan."     Yvonne Hedrick M.S.CCC-SLP  Speech Language Pathologist   6/13/2018   "

## 2018-06-14 ENCOUNTER — TELEPHONE (OUTPATIENT)
Dept: REHABILITATION | Facility: HOSPITAL | Age: 38
End: 2018-06-14

## 2018-06-18 ENCOUNTER — OFFICE VISIT (OUTPATIENT)
Dept: CARDIOLOGY | Facility: CLINIC | Age: 38
End: 2018-06-18
Payer: MEDICAID

## 2018-06-18 VITALS
BODY MASS INDEX: 29.37 KG/M2 | HEIGHT: 69 IN | HEART RATE: 71 BPM | WEIGHT: 198.31 LBS | SYSTOLIC BLOOD PRESSURE: 124 MMHG | DIASTOLIC BLOOD PRESSURE: 90 MMHG | OXYGEN SATURATION: 98 %

## 2018-06-18 DIAGNOSIS — R94.30 CARDIAC LV EJECTION FRACTION OF 40-49%: ICD-10-CM

## 2018-06-18 DIAGNOSIS — I69.320 CVA, OLD, APHASIA: ICD-10-CM

## 2018-06-18 DIAGNOSIS — Z86.718 HISTORY OF DVT OF LOWER EXTREMITY: ICD-10-CM

## 2018-06-18 DIAGNOSIS — I10 ESSENTIAL HYPERTENSION: ICD-10-CM

## 2018-06-18 DIAGNOSIS — I71.9 PSEUDOANEURYSM OF AORTA: ICD-10-CM

## 2018-06-18 DIAGNOSIS — R47.01 APHASIA: Primary | ICD-10-CM

## 2018-06-18 PROCEDURE — 99215 OFFICE O/P EST HI 40 MIN: CPT | Mod: S$PBB,,, | Performed by: INTERNAL MEDICINE

## 2018-06-18 PROCEDURE — 99213 OFFICE O/P EST LOW 20 MIN: CPT | Mod: PBBFAC,PN | Performed by: INTERNAL MEDICINE

## 2018-06-18 PROCEDURE — 99999 PR PBB SHADOW E&M-EST. PATIENT-LVL III: CPT | Mod: PBBFAC,,, | Performed by: INTERNAL MEDICINE

## 2018-06-18 RX ORDER — METOPROLOL SUCCINATE 50 MG/1
50 TABLET, EXTENDED RELEASE ORAL DAILY
Qty: 90 TABLET | Refills: 3 | Status: SHIPPED | OUTPATIENT
Start: 2018-06-18 | End: 2018-09-21 | Stop reason: SDUPTHER

## 2018-06-18 RX ORDER — LISINOPRIL 10 MG/1
10 TABLET ORAL DAILY
Qty: 90 TABLET | Refills: 3 | Status: SHIPPED | OUTPATIENT
Start: 2018-06-18 | End: 2018-07-09 | Stop reason: ALTCHOICE
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
## 2018-06-18 NOTE — PATIENT INSTRUCTIONS
Assessment/Plan:  Noy Bourne is a 37 y.o. male with a past medical history of HTN, DVT (with IVC filter in place), thoracic aortic aneurysm s/p repair and CVA, who presents for a follow up appointment.     1. Thoracic aortic aneurysm s/p repair and CVA-  Mechanism of stroke unknown.  Check echo with bubble study to evaluate further.  Continue ASA, statin, plavix, beta blocker.    2. Depressed EF- Etiology unclear.  Check nuclear stress test to evaluate for ischemic etiology.  Change metoprolol tartrate to metoprolol succinate 50 mg daily.  Start lisinopril 5 mg daily and discontinue amlodipine.      2. HTN- Continue current antihypertensive medication regimen.  Pt to keep log of blood pressure/heart rate and bring in next visit for review.       Follow up in 2 weeks

## 2018-06-20 ENCOUNTER — CLINICAL SUPPORT (OUTPATIENT)
Dept: REHABILITATION | Facility: HOSPITAL | Age: 38
End: 2018-06-20
Attending: FAMILY MEDICINE
Payer: MEDICAID

## 2018-06-20 DIAGNOSIS — R47.01 APHASIA: ICD-10-CM

## 2018-06-20 PROCEDURE — 92507 TX SP LANG VOICE COMM INDIV: CPT | Mod: PN

## 2018-06-20 NOTE — PROGRESS NOTES
Outpatient Neurological Rehabilitation   Speech and Language Therapy Daily Note  Date:  6/20/2018     Start Time:  0851  Stop Time:  0931    Name: Noy Bourne   MRN: [id number]     Therapy Diagnosis:   Encounter Diagnosis   Name Primary?    Aphasia    Physician: Ericka Rainey MD  Physician Orders: speech therapy evaluate and treat  Medical CVA    Visit #:  8  Visits Authorized: 50  Date of Evaluation:  5/10/18  Authorization Period: May 7, 2018 to December 31, 2018  Plan of Care Expiration:    5/10/18 to 7/10/18    G-CODE   8/10      Precautions: Standard     History:   Current Medical History: Noy Bourne presents to the [facility] with moderately-markedly impaired language and cognitive skills secondary to the medical diagnosis of CVA.  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 He was reportedly discharged on 4/30/18 and traveled home to [place] the next day.    Subjective:   Pt reports:  Pt pleasant.    Pain Scale:  0/10 on VAS currently.   Pain Location: n/a    Objective:   TIMED  Procedure Min.                UNTIMED  Procedure Min.   Speech- Language- Voice Therapy    40 minutes             Total Minutes: 40  Total Timed Units: 0  Total Untimed Units: 1  Charges Billed/# of units: 1    Short Term Goals: (4 weeks) Current Progress:   1. Pt will answer complex yes/no questions with 80% acc given min A to improve receptive language skills.  -Moderate yes/no questions -Not addressed this session.  -Complex yes/no questions -Not addressed this session.       4. Pt will name objects with 50% acc given min A.  -47% acc Independently; 87% acc min A  GOAL MET X2     7. Pt will read 3 word sentences with 80% acc given min A.    -read 3 word sentences x 15 with 33% acc Independently; 73%  acc min A       8. Pt will identify body parts by pointing with 80% acc given min A.    -x 12 with 58% acc independently; 75% acc min A   9. Pt will follow simple one-step directions with 90% acc independently to improve receptive language skills   --Not addressed this session.     10. Pt will read two-syllable words with 50% acc given min A to improve auditory comprehension.   -X 22 with 50% acc Independently; 82% acc min A   11. Pt will repeat one syllable words with 70% acc Independently.   X 19 with 68% acc Independently; 95% acc min A    12. Pt will name objects with 60% acc. independently New goal     13. Pt will follow written directions with 50% acc given min A.  New goal  X 5 with mod A.        Goals Met:  2. Pt will follow simple one step directions with 50% acc given min A to improve receptive language skills. GOAL MET 6/6/18  5. Pt will read common one-syllable words with 50% acc given min A to improve auditory comprehension. GOAL MET 6/6/18  3. Pt will repeat one syllable words with 50% acc given min A. GOAL MET 6/13/18  4. Pt will name objects given binary choice with 50% acc given min A. GOAL MET  6. Pt will participate in automatic speech acts with 50% acc given min A.   GOAL MET 6/13/18  4. Pt will name objects with 50% acc given min A. GOAL MET 6/20/18    Patient Education/Response:   Pt and wife educated on goals and plan of care and reported understanding.     Home Program: Matching objects to words worksheet and reading multi-syllabic words, and short sentences [employer] workbooks, [employer]    Assessment:   Noy is progressing well towards his goals. Goal met for naming objects with 50% acc with min A. Increase in reading 3-word sentences independently. Increase in reading 2-syllable words. Increase naming body parts independently. Two new goals established. Current goals remain appropriate. Goals to be updated as necessary.     Pt prognosis is Good. Pt will continue to benefit from  "skilled outpatient speech and language therapy to address the deficits listed in the problem list on initial evaluation, provide pt/family education and to maximize pt's level of independence in the home and community environment.     Medical necessity is demonstrated by the following IMPAIRMENTS:  -severe receptive and expressive aphasia  -difficulty following simple 1 step commands  -difficulty naming objects  -difficulty answering yes/no questions  -difficulty repeating words; improvement noted  -difficulty expressing wants/needs; improvement noted  -difficulty participating in conversation    Environmental Concerns/Cultural/Spiritual/Developmental/Educational Needs:  Pt's spiritual, cultural and educational needs considered and pt agreeable to plan of care and goals.    Plan:   Continue POC with focus on word finding and comprehension.   Updated POC due 7/10/18.    Claudette Snyder,  Clinician    "I certify that I was present in the room directing the student and service delivery and guiding them using my skilled judgement. As the co-signing therapist, I have reviewed the student's documentation, and am responsible for the treatment, assessment and plan."     Yvonne Hedrick M.S.CCC-SLP  Speech Language Pathologist   6/20/2018   "

## 2018-06-25 ENCOUNTER — TELEPHONE (OUTPATIENT)
Dept: REHABILITATION | Facility: HOSPITAL | Age: 38
End: 2018-06-25

## 2018-06-26 ENCOUNTER — TELEPHONE (OUTPATIENT)
Dept: CARDIOLOGY | Facility: CLINIC | Age: 38
End: 2018-06-26

## 2018-06-26 NOTE — TELEPHONE ENCOUNTER
----- Message from Monica Rudd sent at 6/26/2018  9:43 AM CDT -----  Contact: [phone]'s wife Madina   Patients wife called in requesting to speak with you. Patient prefers to speak with a nurse. Please advise.

## 2018-06-27 ENCOUNTER — CLINICAL SUPPORT (OUTPATIENT)
Dept: REHABILITATION | Facility: HOSPITAL | Age: 38
End: 2018-06-27
Attending: FAMILY MEDICINE
Payer: MEDICAID

## 2018-06-27 DIAGNOSIS — R47.01 APHASIA: ICD-10-CM

## 2018-06-27 PROCEDURE — 92507 TX SP LANG VOICE COMM INDIV: CPT | Mod: PN

## 2018-06-27 NOTE — PROGRESS NOTES
Outpatient Neurological Rehabilitation   Speech and Language Therapy Daily Note  Date:  6/27/2018     Start Time:  0851  Stop Time:  0931    Name: Noy Bourne   MRN: [id number]     Therapy Diagnosis:   Encounter Diagnosis   Name Primary?    Aphasia      Physician: Ericka Rainey MD  Physician Orders: speech therapy evaluate and treat  Medical CVA    Visit #:  9  Visits Authorized: 50  Date of Evaluation:  5/10/18  Authorization Period: May 7, 2018 to December 31, 2018  Plan of Care Expiration:    5/10/18 to 7/10/18    G-CODE   9/10      Precautions: Standard     History:   Current Medical History: Noy Bourne presents to the [facility] with moderately-markedly impaired language and cognitive skills secondary to the medical diagnosis of CVA.  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 He was reportedly discharged on 4/30/18 and traveled home to [place] the next day.    Subjective:   Pt reports:  Pt pleasant.    Pain Scale:  0/10 on VAS currently.   Pain Location: n/a    Objective:   TIMED  Procedure Min.                UNTIMED  Procedure Min.   Speech- Language- Voice Therapy    40 minutes             Total Minutes: 40  Total Timed Units: 0  Total Untimed Units: 1  Charges Billed/# of units: 1    Short Term Goals: (4 weeks) Current Progress:   1. Pt will answer complex yes/no questions with 80% acc given min A to improve receptive language skills.  -Moderate yes/no questions -Not addressed this session.  -Complex yes/no questions -Not addressed this session.       7. Pt will read 3 word sentences with 80% acc given min A.    -read 3 word sentences x 20 with 80% acc Independently; 95% acc min A       8. Pt will identify body parts by pointing with 80% acc given min A.    -x 12 with 92%  acc independently; 100% acc min A   9. Pt will follow simple one-step directions with 90% acc independently to improve receptive language skills   - X 10 with 80% acc independently; 90% acc min A   10. Pt will read two-syllable words with 50% acc given min A to improve auditory comprehension.   -X 20 with 65% acc Independently; 100% acc min A    X 3 repetitions     11. Pt will repeat one syllable words with 70% acc Independently.   X 20 with 75% acc Independently; 95% acc min A     X 3 repetitions   12. Pt will name objects with 60% acc. independently X 15 with 73% acc independently; 93% acc min A    GOAL MET X 1 SESSION     13. Pt will follow written directions with 50% acc given min A.  X 5 with mod A.    14. Pt will name concrete category members x 10 in 60 seconds with 70% acc given min A to improve word finding Animals- X 6 with min A  States- X 3 min A     Goals Met:  2. Pt will follow simple one step directions with 50% acc given min A to improve receptive language skills. GOAL MET 6/6/18  5. Pt will read common one-syllable words with 50% acc given min A to improve auditory comprehension. GOAL MET 6/6/18  3. Pt will repeat one syllable words with 50% acc given min A. GOAL MET 6/13/18  4. Pt will name objects given binary choice with 50% acc given min A. GOAL MET  6. Pt will participate in automatic speech acts with 50% acc given min A.   GOAL MET 6/13/18  4. Pt will name objects with 50% acc given min A. GOAL MET 6/20/18    Patient Education/Response:   Pt and wife educated on goals and plan of care and reported understanding.     Home Program: Matching objects to words worksheet and reading multi-syllabic words, and short sentences [employer] workbooks, [employer]    Assessment:   Noy is progressing well towards his goals. Met goal X 1 for naming objects with 60% acc independently. Increase in reading 3-word sentences independently. Increase in reading 1- 2 word syllable words. Increase naming body  "parts independently. One new goal established. Current goals remain appropriate. Goals to be updated as necessary.     Pt prognosis is Good. Pt will continue to benefit from skilled outpatient speech and language therapy to address the deficits listed in the problem list on initial evaluation, provide pt/family education and to maximize pt's level of independence in the home and community environment.     Medical necessity is demonstrated by the following IMPAIRMENTS:  -severe receptive and expressive aphasia  -difficulty following simple 1 step commands  -difficulty naming objects  -difficulty answering yes/no questions  -difficulty repeating words; improvement noted  -difficulty expressing wants/needs; improvement noted  -difficulty participating in conversation    Environmental Concerns/Cultural/Spiritual/Developmental/Educational Needs:  Pt's spiritual, cultural and educational needs considered and pt agreeable to plan of care and goals.    Plan:   Continue POC with focus on word finding and comprehension.   Updated POC due 7/10/18.    Claudette Snyder,  Clinician    "I certify that I was present in the room directing the student and service delivery and guiding them using my skilled judgement. As the co-signing therapist, I have reviewed the student's documentation, and am responsible for the treatment, assessment and plan."     Yvonne Hedrick M.S.CCC-SLP  Speech Language Pathologist   6/27/2018   "

## 2018-07-02 ENCOUNTER — CLINICAL SUPPORT (OUTPATIENT)
Dept: REHABILITATION | Facility: HOSPITAL | Age: 38
End: 2018-07-02
Attending: FAMILY MEDICINE
Payer: MEDICAID

## 2018-07-02 DIAGNOSIS — R47.01 APHASIA: ICD-10-CM

## 2018-07-02 PROCEDURE — 92507 TX SP LANG VOICE COMM INDIV: CPT | Mod: PN

## 2018-07-02 NOTE — PROGRESS NOTES
Outpatient Neurological Rehabilitation   Speech and Language Therapy Daily Note  Date:  7/2/2018     Start Time:  0900  Stop Time:  0935    Name: Noy Bourne   MRN: [id number]     Therapy Diagnosis:   Encounter Diagnosis   Name Primary?    Aphasia      Physician: Ericka Rainey MD  Physician Orders: speech therapy evaluate and treat  Medical CVA    Visit #:  9  Visits Authorized: 50  Date of Evaluation:  5/10/18  Authorization Period: May 7, 2018 to December 31, 2018  Plan of Care Expiration: 5/10/18 to 7/10/18    G-CODE   9/10      Precautions: Standard     History:   Current Medical History: Noy Bourne presents to the [facility] with moderately-markedly impaired language and cognitive skills secondary to the medical diagnosis of CVA.  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 He was reportedly discharged on 4/30/18 and traveled home to [place] the next day.    Subjective:   Pt reports:  Pt pleasant. Pt did not return home program.   Pain Scale:  0/10 on VAS currently.   Pain Location: n/a    Objective:   TIMED  Procedure Min.                UNTIMED  Procedure Min.   Speech- Language- Voice Therapy    35 minutes             Total Minutes: 35  Total Timed Units: 0  Total Untimed Units: 1  Charges Billed/# of units: 1    Short Term Goals: (4 weeks) Current Progress:   1. Pt will answer complex yes/no questions with 80% acc given min A to improve receptive language skills.  -Moderate yes/no questions X 10 with 90% acc Independently; 100 % acc min A  X 4 repetitions  -Complex yes/no questions -Not addressed this session.       7. Pt will read 3 word sentences with 80% acc given min A.    -Not addressed this session.       8. Pt will identify body parts by pointing with 80% acc given min  A.    -Not addressed this session.     9. Pt will follow simple one-step directions with 90% acc independently to improve receptive language skills   -Not addressed this session.   10. Pt will read two-syllable words with 50% acc given min A to improve auditory comprehension.   -Not addressed this session.       11. Pt will repeat one syllable words with 70% acc Independently.   -Not addressed this session.     12. Pt will name objects with 60% acc. independently -Not addressed this session.     13. Pt will follow written directions with 50% acc given min A.  -Not addressed this session.     14. Pt will name concrete category members x 10 in 60 seconds with 70% acc given min A to improve word finding Animals- X 6 Independently in 60 seconds; X 15 min A untimed         Additional: Pt participated in a cross world puzzle with 100% acc given mod A to improve spelling and expressive language skills. This activity took a significant amount of time to complete.     Possible future goals: letter identification, writing letters    Goals Met:  2. Pt will follow simple one step directions with 50% acc given min A to improve receptive language skills. GOAL MET 6/6/18  5. Pt will read common one-syllable words with 50% acc given min A to improve auditory comprehension. GOAL MET 6/6/18  3. Pt will repeat one syllable words with 50% acc given min A. GOAL MET 6/13/18  4. Pt will name objects given binary choice with 50% acc given min A. GOAL MET  6. Pt will participate in automatic speech acts with 50% acc given min A.   GOAL MET 6/13/18  4. Pt will name objects with 50% acc given min A. GOAL MET 6/20/18    Patient Education/Response:   Pt educated on goals and plan of care and reported understanding.     Home Program: Complex yes/no questions from [employer], [employer]     Assessment:   Noy is progressing well towards his goals. Increase in naming concrete category members with min A. New task implemented for crossword  "puzzle. Current goals remain appropriate. Goals to be updated as necessary.     Pt prognosis is Good. Pt will continue to benefit from skilled outpatient speech and language therapy to address the deficits listed in the problem list on initial evaluation, provide pt/family education and to maximize pt's level of independence in the home and community environment.     Medical necessity is demonstrated by the following IMPAIRMENTS:  -severe receptive and expressive aphasia  -difficulty following simple 1 step commands  -difficulty naming objects  -difficulty answering yes/no questions  -difficulty repeating words; improvement noted  -difficulty expressing wants/needs; improvement noted  -difficulty participating in conversation    Environmental Concerns/Cultural/Spiritual/Developmental/Educational Needs:  Pt's spiritual, cultural and educational needs considered and pt agreeable to plan of care and goals.    Plan:   Continue POC with focus on word finding and comprehension.   Updated POC due 7/10/18.    Claudette Snyder,  Clinician    "I certify that I was present in the room directing the student and service delivery and guiding them using my skilled judgement. As the co-signing therapist, I have reviewed the student's documentation, and am responsible for the treatment, assessment and plan."     Yvonne Hedrick M.S.CCC-SLP  Speech Language Pathologist   7/2/2018   "

## 2018-07-06 ENCOUNTER — TELEPHONE (OUTPATIENT)
Dept: REHABILITATION | Facility: HOSPITAL | Age: 38
End: 2018-07-06

## 2018-07-09 ENCOUNTER — OFFICE VISIT (OUTPATIENT)
Dept: FAMILY MEDICINE | Facility: HOSPITAL | Age: 38
End: 2018-07-09
Payer: MEDICAID

## 2018-07-09 ENCOUNTER — OFFICE VISIT (OUTPATIENT)
Dept: CARDIOLOGY | Facility: CLINIC | Age: 38
End: 2018-07-09
Payer: MEDICAID

## 2018-07-09 VITALS
SYSTOLIC BLOOD PRESSURE: 152 MMHG | DIASTOLIC BLOOD PRESSURE: 112 MMHG | HEART RATE: 68 BPM | WEIGHT: 202.63 LBS | BODY MASS INDEX: 30.01 KG/M2 | HEIGHT: 69 IN | OXYGEN SATURATION: 97 %

## 2018-07-09 VITALS
SYSTOLIC BLOOD PRESSURE: 151 MMHG | DIASTOLIC BLOOD PRESSURE: 101 MMHG | HEIGHT: 69 IN | WEIGHT: 202.81 LBS | BODY MASS INDEX: 30.04 KG/M2 | HEART RATE: 76 BPM

## 2018-07-09 DIAGNOSIS — F41.9 ANXIETY DISORDER, UNSPECIFIED TYPE: ICD-10-CM

## 2018-07-09 DIAGNOSIS — I69.320 CVA, OLD, APHASIA: Primary | ICD-10-CM

## 2018-07-09 DIAGNOSIS — I10 ESSENTIAL HYPERTENSION: ICD-10-CM

## 2018-07-09 DIAGNOSIS — R94.30 CARDIAC LV EJECTION FRACTION OF 40-49%: ICD-10-CM

## 2018-07-09 PROCEDURE — 99999 PR PBB SHADOW E&M-EST. PATIENT-LVL IV: CPT | Mod: PBBFAC,,, | Performed by: INTERNAL MEDICINE

## 2018-07-09 PROCEDURE — 99215 OFFICE O/P EST HI 40 MIN: CPT | Mod: S$PBB,,, | Performed by: INTERNAL MEDICINE

## 2018-07-09 PROCEDURE — 99214 OFFICE O/P EST MOD 30 MIN: CPT | Mod: PBBFAC,PN | Performed by: INTERNAL MEDICINE

## 2018-07-09 PROCEDURE — 99213 OFFICE O/P EST LOW 20 MIN: CPT | Mod: 27 | Performed by: STUDENT IN AN ORGANIZED HEALTH CARE EDUCATION/TRAINING PROGRAM

## 2018-07-09 RX ORDER — VALSARTAN 40 MG/1
40 TABLET ORAL 2 TIMES DAILY
Qty: 60 TABLET | Refills: 11 | Status: SHIPPED | OUTPATIENT
Start: 2018-07-09 | End: 2018-08-09 | Stop reason: DRUGHIGH
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
## 2018-07-09 NOTE — PROGRESS NOTES
Subjective:       Patient ID: Noy Bourne is a 37 y.o. male.    Chief Complaint: Anxiety and Hypertension    Anxiety   Patient reports no chest pain, palpitations or shortness of breath.       Hypertension   Associated symptoms include anxiety. Pertinent negatives include no chest pain, headaches, palpitations or shortness of breath.      37 year old male presents for follow up of his anxiety. Patient was seen previously in clinic by Dr. Rainey and was prescribed lexapro for anxiety associated with recent CVA. However, since that time patient has been working with speech therapy with improvement in symptoms. Patient never started taking the medication and feels like he does not need to take it now. Patient evaluated by cardiology earlier today for his hypertension. Previously prescribed lisinopril causing the patient to cough, so his medication was changed to valsartan by cardiology. Patient requesting a work excuse note at this visit. No further complaints.      Review of Systems   Constitutional: Negative for fever.   HENT: Negative for sore throat.    Eyes: Negative for visual disturbance.   Respiratory: Positive for cough. Negative for chest tightness, shortness of breath and wheezing.    Cardiovascular: Negative for chest pain and palpitations.   Gastrointestinal: Negative for diarrhea and vomiting.   Skin: Negative for rash.   Neurological: Positive for speech difficulty. Negative for seizures, facial asymmetry, weakness, numbness and headaches.       Objective:      Vitals:    07/09/18 1459   BP: (!) 151/101   Pulse: 76     Physical Exam   Constitutional: He appears well-developed and well-nourished.   HENT:   Head: Normocephalic and atraumatic.   Eyes: Pupils are equal, round, and reactive to light.   Neck: Normal range of motion.   Cardiovascular: Normal rate and regular rhythm.    No murmur heard.  Pulmonary/Chest: Effort normal. No respiratory distress.   Abdominal: Soft. He exhibits no distension. There  is no tenderness.   Musculoskeletal: Normal range of motion.   Neurological: He is alert. He has normal strength. No cranial nerve deficit.   Pt with aphasia   Skin: Skin is warm and dry.       Assessment:       1. CVA, old, aphasia    2. Essential hypertension    3. Anxiety disorder, unspecified type        Plan:       CVA, old, aphasia  Comments:  Patient continuing to work with speech therapy  Provided work excuse at this visit      Essential hypertension  Comments:  Will continue on metoprolol and will switch from lisinopril to valsartan 2/2 medication side effect (cough).   Continue to check bp at home    Anxiety disorder, unspecified type  Comments:  Patient prescribed lexapro at previous visit, but did not begin taking medication  States that his anxiety resolved as he continues to work with speech therapy      Follow-up if symptoms worsen or fail to improve.        Pt discussed with Dr. Inna Zamarripa MD

## 2018-07-09 NOTE — PATIENT INSTRUCTIONS
Assessment/Plan:  Noy Bourne is a 37 y.o. male with a past medical history of HTN, DVT (with IVC filter in place), thoracic aortic aneurysm s/p repair and CVA, who presents for a follow up appointment.     1. Thoracic aortic aneurysm s/p repair and CVA-  Mechanism of stroke unknown.  Continue ASA, statin, plavix, beta blocker.    2. Depressed EF- Nuclear stress test shows global hypokinesis with a low stress ejection fraction and fixed inferior wall perfusion defect.  Discontinue lisinopril to 10 mg daily due to cough and start valsartan 40 mg bid.  Continue metoprolol succinate 50 mg daily.      3. HTN- Pt to keep log of blood pressure/heart rate and bring in next visit for review.       Follow up in 2 weeks

## 2018-07-10 ENCOUNTER — TELEPHONE (OUTPATIENT)
Dept: CARDIOLOGY | Facility: CLINIC | Age: 38
End: 2018-07-10

## 2018-07-10 NOTE — TELEPHONE ENCOUNTER
----- Message from Marialuisa Hurst sent at 7/10/2018  8:57 AM CDT -----  Contact: Self/ [phone]  Patient called in to let you know he is having problems getting his medication valsartan (DIOVAN) 40 MG tablet from the pharmacy.    Please call and advise.

## 2018-07-10 NOTE — TELEPHONE ENCOUNTER
----- Message from Monica Rudd sent at 7/10/2018 11:20 AM CDT -----  Contact: [facility] pharmacy [phone]  Pharmacy would like to speak with you about changing medication valsartan (DIOVAN) 40 MG tablet to Valsartan 80 mg. Please advise.

## 2018-07-10 NOTE — TELEPHONE ENCOUNTER
Spoke to patients wife, she stated that patient having a hard time getting medication due to prior authorization   informed her we are working on getting authorization  and as soon as we get the authorization we will give them a call.

## 2018-07-11 ENCOUNTER — CLINICAL SUPPORT (OUTPATIENT)
Dept: REHABILITATION | Facility: HOSPITAL | Age: 38
End: 2018-07-11
Attending: FAMILY MEDICINE
Payer: MEDICAID

## 2018-07-11 ENCOUNTER — HOSPITAL ENCOUNTER (EMERGENCY)
Facility: HOSPITAL | Age: 38
Discharge: HOME | End: 2018-07-11
Attending: FAMILY MEDICINE
Payer: MEDICAID

## 2018-07-11 ENCOUNTER — TELEPHONE (OUTPATIENT)
Dept: CARDIOLOGY | Facility: CLINIC | Age: 38
End: 2018-07-11

## 2018-07-11 VITALS
BODY MASS INDEX: 26.84 KG/M2 | WEIGHT: 171 LBS | HEART RATE: 61 BPM | HEIGHT: 67 IN | DIASTOLIC BLOOD PRESSURE: 102 MMHG | TEMPERATURE: 98 F | RESPIRATION RATE: 18 BRPM | SYSTOLIC BLOOD PRESSURE: 143 MMHG | OXYGEN SATURATION: 95 %

## 2018-07-11 DIAGNOSIS — R07.9 CHEST PAIN: Primary | ICD-10-CM

## 2018-07-11 DIAGNOSIS — R47.01 APHASIA: ICD-10-CM

## 2018-07-11 LAB
ALBUMIN SERPL BCP-MCNC: 4.3 G/DL
ALP SERPL-CCNC: 93 U/L
ALT SERPL W/O P-5'-P-CCNC: 55 U/L
ANION GAP SERPL CALC-SCNC: 7 MMOL/L
AST SERPL-CCNC: 36 U/L
BASOPHILS # BLD AUTO: 0.04 K/UL
BASOPHILS NFR BLD: 0.5 %
BILIRUB SERPL-MCNC: 0.8 MG/DL
BUN SERPL-MCNC: 12 MG/DL
CALCIUM SERPL-MCNC: 9.2 MG/DL
CHLORIDE SERPL-SCNC: 106 MMOL/L
CO2 SERPL-SCNC: 29 MMOL/L
CREAT SERPL-MCNC: 0.85 MG/DL
DIFFERENTIAL METHOD: ABNORMAL
EOSINOPHIL # BLD AUTO: 0.1 K/UL
EOSINOPHIL NFR BLD: 1.6 %
ERYTHROCYTE [DISTWIDTH] IN BLOOD BY AUTOMATED COUNT: 14.4 %
EST. GFR  (AFRICAN AMERICAN): >60 ML/MIN/1.73 M^2
EST. GFR  (NON AFRICAN AMERICAN): >60 ML/MIN/1.73 M^2
GLUCOSE SERPL-MCNC: 83 MG/DL
HCT VFR BLD AUTO: 46.7 %
HGB BLD-MCNC: 16.3 G/DL
LYMPHOCYTES # BLD AUTO: 3.9 K/UL
LYMPHOCYTES NFR BLD: 48.7 %
MCH RBC QN AUTO: 31.4 PG
MCHC RBC AUTO-ENTMCNC: 34.9 G/DL
MCV RBC AUTO: 90 FL
MONOCYTES # BLD AUTO: 0.7 K/UL
MONOCYTES NFR BLD: 8.7 %
NEUTROPHILS # BLD AUTO: 3.2 K/UL
NEUTROPHILS NFR BLD: 40.2 %
NT-PROBNP: 48 PG/ML
PLATELET # BLD AUTO: 174 K/UL
PMV BLD AUTO: 10.4 FL
POTASSIUM SERPL-SCNC: 4.7 MMOL/L
PROT SERPL-MCNC: 8.1 G/DL
RBC # BLD AUTO: 5.19 M/UL
SODIUM SERPL-SCNC: 142 MMOL/L
TROPONIN I SERPL DL<=0.01 NG/ML-MCNC: <0.012 NG/ML
TROPONIN I SERPL DL<=0.01 NG/ML-MCNC: <0.012 NG/ML
WBC # BLD AUTO: 7.96 K/UL

## 2018-07-11 PROCEDURE — 96374 THER/PROPH/DIAG INJ IV PUSH: CPT | Mod: 59

## 2018-07-11 PROCEDURE — 83880 ASSAY OF NATRIURETIC PEPTIDE: CPT

## 2018-07-11 PROCEDURE — 93005 ELECTROCARDIOGRAM TRACING: CPT

## 2018-07-11 PROCEDURE — 84484 ASSAY OF TROPONIN QUANT: CPT

## 2018-07-11 PROCEDURE — 92507 TX SP LANG VOICE COMM INDIV: CPT | Mod: PN

## 2018-07-11 PROCEDURE — 85025 COMPLETE CBC W/AUTO DIFF WBC: CPT

## 2018-07-11 PROCEDURE — 99284 EMERGENCY DEPT VISIT MOD MDM: CPT | Mod: 25

## 2018-07-11 PROCEDURE — 25000003 PHARM REV CODE 250: Performed by: FAMILY MEDICINE

## 2018-07-11 PROCEDURE — 63600175 PHARM REV CODE 636 W HCPCS: Performed by: FAMILY MEDICINE

## 2018-07-11 PROCEDURE — 25500020 PHARM REV CODE 255: Performed by: FAMILY MEDICINE

## 2018-07-11 PROCEDURE — 80053 COMPREHEN METABOLIC PANEL: CPT

## 2018-07-11 PROCEDURE — 93010 ELECTROCARDIOGRAM REPORT: CPT | Mod: ,,, | Performed by: INTERNAL MEDICINE

## 2018-07-11 PROCEDURE — 94760 N-INVAS EAR/PLS OXIMETRY 1: CPT

## 2018-07-11 RX ORDER — FOLIC ACID 1 MG/1
1 TABLET ORAL DAILY
COMMUNITY
End: 2018-09-21 | Stop reason: SDUPTHER

## 2018-07-11 RX ORDER — METOPROLOL SUCCINATE 50 MG/1
50 TABLET, EXTENDED RELEASE ORAL DAILY
COMMUNITY
End: 2018-08-09 | Stop reason: SDUPTHER

## 2018-07-11 RX ORDER — ATORVASTATIN CALCIUM 80 MG/1
80 TABLET, FILM COATED ORAL DAILY
COMMUNITY
End: 2018-08-09 | Stop reason: SDUPTHER

## 2018-07-11 RX ORDER — ASPIRIN 325 MG
325 TABLET ORAL
Status: COMPLETED | OUTPATIENT
Start: 2018-07-11 | End: 2018-07-11

## 2018-07-11 RX ORDER — NAPROXEN SODIUM 220 MG/1
81 TABLET, FILM COATED ORAL DAILY
COMMUNITY
End: 2018-08-09 | Stop reason: SDUPTHER

## 2018-07-11 RX ORDER — VALSARTAN 80 MG/1
80 TABLET ORAL DAILY
COMMUNITY
End: 2018-08-09

## 2018-07-11 RX ORDER — CLOPIDOGREL BISULFATE 75 MG/1
75 TABLET ORAL DAILY
COMMUNITY
End: 2018-08-09 | Stop reason: SDUPTHER

## 2018-07-11 RX ORDER — FUROSEMIDE 10 MG/ML
40 INJECTION INTRAMUSCULAR; INTRAVENOUS
Status: COMPLETED | OUTPATIENT
Start: 2018-07-11 | End: 2018-07-11

## 2018-07-11 RX ADMIN — NITROGLYCERIN 1 INCH: 20 OINTMENT TOPICAL at 12:07

## 2018-07-11 RX ADMIN — FUROSEMIDE 40 MG: 10 INJECTION, SOLUTION INTRAMUSCULAR; INTRAVENOUS at 01:07

## 2018-07-11 RX ADMIN — ASPIRIN 325 MG ORAL TABLET 325 MG: 325 PILL ORAL at 12:07

## 2018-07-11 RX ADMIN — IOHEXOL 100 ML: 350 INJECTION, SOLUTION INTRAVENOUS at 01:07

## 2018-07-11 NOTE — PLAN OF CARE
Date: 07/11/2018    SPEECH THERAPY UPDATED PLAN OF TREATMENT    Patient name: Noy Bourne  Onset Date:  April 2018  SOC Date:  5/10/18  Primary Diagnosis:    1. Aphasia       Treatment Diagnosis: aphasia  Certification Period:  May 7, 2018 to December 31, 2018  Plan of Care Certification Period: 7/10/18 to 9/10/18  Precautions:  standard  Visits from SOC:  10  Functional Level Prior to SOC:  Independent    Updated Assessment:  Pt met 9 goals. Pt is progressing towards all other goals.  Significant improvement noted answering moderate and complex yes/no questions with 100% given min A and frequent repetitions. Pt is able to read 1-3 syllable words with 70% acc. Pt is able to name objects with 75% acc Independently. Follow writing directions with 80% acc given min A. Name concrete category members x 5 Independently in 60 seconds (average is 15-20).      Goals Met:  2. Pt will follow simple one step directions with 50% acc given min A to improve receptive language skills. GOAL MET 6/6/18  5. Pt will read common one-syllable words with 50% acc given min A to improve auditory comprehension. GOAL MET 6/6/18  3. Pt will repeat one syllable words with 50% acc given min A. GOAL MET 6/13/18  4. Pt will name objects given binary choice with 50% acc given min A. GOAL MET  6. Pt will participate in automatic speech acts with 50% acc given min A. GOAL MET 6/13/18  4. Pt will name objects with 50% acc given min A. GOAL MET 6/20/18  7. Pt will read 3 word sentences with 80% acc given min A. GOAL MET 7/11/18  12. Pt will name objects with 60% acc Independently.. GOAL MET and increased  13. Pt will follow written directions with 50% acc given min A... GOAL MET and increased      Current Short Term Goals Status:     1. Pt will answer complex yes/no questions with 80% acc given min A to improve receptive language skills... progressing  8. Pt will identify body parts by pointing with 80% acc given min A... progressing  9. Pt will follow  simple one-step directions with 90% acc independently to improve receptive language skills... progressing  10. Pt will read two-syllable words with 50% acc given min A to improve auditory comprehension... progressing  11. Pt will repeat one syllable words with 70% acc Independently... progressing  14. Pt will name concrete category members x 10 in 60 seconds with 70% acc given min A to improve word finding... progressing    New Goals:   15. Pt will name objects with 80% acc Independently.  16. Pt will follow written directions with 80% acc given min A.    Long Term Goal Status:   continue per initial plan of care.  Reasons for Recertification of Therapy:   Mr. Bourne continues with moderate-severe expressive aphasia c/b difficulty answering mod-complex yes/no questions, repeating words, following verbal and written commands, participating in conversation, naming, reading, and writing. He has met 9 goals since initial evaluation demonstrating great progress. Pt would benefit from ongoing skilled speech therapy services to address expressive and expressive language deficits.     Certification Period: May 7, 2018 to December 31, 2018  Recommended Treatment Plan: 3 times per week for 8 weeks: Patient Education, Self Care, Therapeutic Activites and Therapeutic Exercise  Other Recommendations: none at this time    Therapist's Name:   Yvonne Hedrick M.S.CCC-SLP  Speech Language Pathologist   Date: 07/11/2018    I CERTIFY THE NEED FOR THESE SERVICES FURNISHED UNDER THIS PLAN OF TREATMENT AND WHILE UNDER MY CARE    Physician's comments: ________________________________________________________________________________________________________________________________________________      Physician's Name: ___________________________________

## 2018-07-11 NOTE — TELEPHONE ENCOUNTER
----- Message from Nandini Calzada sent at 7/11/2018 11:02 AM CDT -----  Contact: Self [phone]  Patient is calling to talk to nurse concerning his blood pressure is still elevated after his medication was changed. Please advice

## 2018-07-11 NOTE — ED PROVIDER NOTES
Encounter Date: 7/11/2018       History     Chief Complaint   Patient presents with    Chest Pain     pt states he just feels funny on the left side of chest and in his head. pain is on the left side of chest no radiation. denies any cardiac hx. had a stroke in april of this year. pt saw cardiologist yesterday and bp meds were changed.      Patient had a stroke in April comes in today describing a left-sided pain to his chest.  Patient is a history of repaired aortic aneurysm.  Otherwise patient has no nausea vomiting no shortness of breath no dyspnea on exertion no history of MI.  No fever chills or night sweats non-productive cough is present          Review of patient's allergies indicates:  No Known Allergies  Past Medical History:   Diagnosis Date    High cholesterol     Hypertension     Stroke      Past Surgical History:   Procedure Laterality Date    ABDOMINAL SURGERY      AORTIC VALVE SURGERY       History reviewed. No pertinent family history.  Social History   Substance Use Topics    Smoking status: Current Every Day Smoker     Packs/day: 0.50    Smokeless tobacco: Never Used      Comment: states stopped this morning    Alcohol use 1.2 oz/week     2 Cans of beer per week     Review of Systems   Constitutional: Negative for fever.   HENT: Negative for sore throat.    Respiratory: Negative for shortness of breath.    Cardiovascular: Positive for chest pain.   Gastrointestinal: Negative for nausea.   Genitourinary: Negative for dysuria.   Musculoskeletal: Negative for back pain.   Skin: Negative for rash.   Neurological: Negative for weakness.   Hematological: Does not bruise/bleed easily.   All other systems reviewed and are negative.      Physical Exam     Initial Vitals [07/11/18 1214]   BP Pulse Resp Temp SpO2   (!) 179/115 62 16 97.7 °F (36.5 °C) 100 %      MAP       --         Physical Exam    Nursing note and vitals reviewed.  Constitutional: He appears well-developed and well-nourished.    HENT:   Head: Normocephalic and atraumatic.   Eyes: Conjunctivae and EOM are normal. Pupils are equal, round, and reactive to light.   Neck: Normal range of motion. Neck supple.   Cardiovascular: Normal rate, regular rhythm and normal heart sounds.   Pulmonary/Chest: Breath sounds normal.   Abdominal: Soft. Bowel sounds are normal.   Musculoskeletal: Normal range of motion.   Neurological: He is alert. He has normal reflexes.   Patient's neurological change from her stroke is he has a form of a fascia         ED Course   Procedures  Labs Reviewed   CBC W/ AUTO DIFFERENTIAL - Abnormal; Notable for the following:        Result Value    MCH 31.4 (*)     Lymph% 48.7 (*)     All other components within normal limits   COMPREHENSIVE METABOLIC PANEL - Abnormal; Notable for the following:     ALT 55 (*)     Anion Gap 7 (*)     All other components within normal limits   TROPONIN I   TROPONIN I   NT-PRO NATRIURETIC PEPTIDE     EKG Readings: (Independently Interpreted)   EKG shows sinus rhythm with a rate of 59 with no ST segment elevation depression or T-wave inversion normal conduction no ectopysigns of STEMI       Imaging Results          CTA Chest Non Coronary (Final result)  Result time 07/11/18 14:06:56    Final result by Dylan Vences MD (07/11/18 14:06:56)                 Impression:      8.1 x 7.0 x 10.2 cm left lower lobe thick-walled fluid collection that appears separate from the pleural space and the descending thoracic aorta.  There is evidence of remote left-sided chest trauma and there is a patent descending thoracic aortic stent graft suggesting previous traumatic aortic injury.  Primary differential considerations include an old intraparenchymal hematoma versus empyema.  Recommended correlation with previous imaging studies.    All CT scans at this facility are performed  using dose modulation techniques as appropriate to performed exam including the following:  automated exposure control; adjustment of mA  and/or kV according to the patients size (this includes techniques or standardized protocols for targeted exams where dose is matched to indication/reason for exam: i.e. extremities or head);  iterative reconstruction technique.      Electronically signed by: Dylan Vences MD  Date:    07/11/2018  Time:    14:06             Narrative:    EXAMINATION:  CTA CHEST NON CORONARY    CLINICAL HISTORY:  Aortic adz, non-traumatic, known or suspect;    TECHNIQUE:  Axial CTA images performed through the chest after the administration of 100 cc intravenous contrast. Maximum intensity projections were performed and interpreted.    COMPARISON:  Chest radiograph performed today.    FINDINGS:  Descending thoracic aortic stent graft is widely patent without evidence endoleak, stenosis, or dissection.  There is a thick walled ovoid fluid collection in the left posterior hemithorax measuring 8.1 x 7.0 x 10.2 cm that appears to be located within the left lower lobe.  There is adjacent compressive atelectasis and pleural thickening.  There is a small left pleural effusion that appears separate from the collection.  No definite communication between this collection and the descending thoracic aorta.  There are adjacent healed left sided rib fractures    Mild centrilobular emphysema.  Pulmonary artery caliber is normal. The heart, great vessels, and mediastinal structures are within normal limits. No thoracic adenopathy.    The upper abdominal visualized organs are within normal limits.                               X-Ray Chest AP Portable (Final result)  Result time 07/11/18 12:52:35    Final result by Dylan Vences MD (07/11/18 12:52:35)                 Impression:      10 cm left retrocardiac mass abutting the aortic stent graft, likely representing the native aortic aneurysm sac.  However, because there are no comparisons available, recommend a dedicated CTA chest with and without contrast for further evaluation.      Electronically  signed by: Dylan Vences MD  Date:    07/11/2018  Time:    12:52             Narrative:    EXAMINATION:  XR CHEST AP PORTABLE    CLINICAL HISTORY:  Chest Pain;    TECHNIQUE:  AP view of the chest was performed.    COMPARISON:  None    FINDINGS:  Patient is status post descending aortic stent graft placement.  There is a large left retrocardiac mass measuring at least 10 cm that is tan fluid with the aortic graft and likely represents the native aortic aneurysm sac.  Blunted left costophrenic angle.   the lungs are otherwise clear bilaterally.  No acute osseous findings demonstrated.                              X-Rays:   Independently Interpreted Readings:   Other Readings:  See radiology report    Medical Decision Making:   Initial Assessment:   Patient sitting in no distress and pleasant. Patient has no other complaints other than documented.     Differential Diagnosis:   Angina, unstable angina, hypertension, hypertension urgency, hypertension emergency, myocardial infarction                        Clinical Impression:   The encounter diagnosis was Chest pain.                             Frandy Murdock MD  07/11/18 1545

## 2018-07-11 NOTE — PROGRESS NOTES
Outpatient Neurological Rehabilitation   Speech and Language Therapy Daily Note  Date:  7/11/2018     Start Time:  0855  Stop Time:  0930    Name: Noy Bourne   MRN: [id number]     Therapy Diagnosis:   Encounter Diagnosis   Name Primary?    Aphasia      Physician: Ericka Rainey MD  Physician Orders: speech therapy evaluate and treat  Medical CVA    Visit #:  10  Visits Authorized: 50  Date of Evaluation:  5/10/18  Authorization Period: May 7, 2018 to December 31, 2018  Plan of Care Expiration: 5/10/18 to 7/10/18    Precautions: Standard     History:   Current Medical History: Noy Bourne presents to the [facility] with moderately-markedly impaired language and cognitive skills secondary to the medical diagnosis of CVA.  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 He was reportedly discharged on 4/30/18 and traveled home to [place] the next day.    Subjective:   Pt reports:  Pt pleasant. Pt returned home program.   Pain Scale:  0/10 on VAS currently.   Pain Location: n/a    Objective:   TIMED  Procedure Min.                UNTIMED  Procedure Min.   Speech- Language- Voice Therapy    35 minutes             Total Minutes: 35  Total Timed Units: 0  Total Untimed Units: 1  Charges Billed/# of units: 1    Short Term Goals: (4 weeks) Current Progress:   1. Pt will answer complex yes/no questions with 80% acc given min A to improve receptive language skills. -Moderate yes/no questions -GOAL MET x 1 session  -Complex yes/no questions -x 7 with 100% acc given slow verbal presentation and frequent repetitions       7. Pt will read 3 word sentences with 80% acc given min A. GOAL MET   -x 8 with 88% acc       8. Pt will identify body parts by pointing with 80% acc given min A.    -Not addressed  this session.     9. Pt will follow simple one-step directions with 90% acc independently to improve receptive language skills   -Not addressed this session.   10. Pt will read two-syllable words with 50% acc given min A to improve auditory comprehension.   -read 1-3 syllable words x 23 with 70% acc Independently; 74% acc min A       11. Pt will repeat one syllable words with 70% acc Independently.   -Not addressed this session.     12. Pt will name objects with 80% acc Independently. MET and increased -x 20 with 75% acc Independently; 100% acc min A      13. Pt will follow written directions with 80% acc given min A. MET and increased   -x 5 with 80% acc min A     14. Pt will name concrete category members x 10 in 60 seconds with 70% acc given min A to improve word finding Animals- X 5 Independently in 60 seconds; X 9 min A in 60 seconds; x 15 min A untimed         Additional: Pt participated in a cross world puzzle x 7 words with 100% acc given mod A to improve spelling and expressive language skills. Improvement in time to complete activity.    Possible future goals: letter identification, writing letters    Goals Met:  2. Pt will follow simple one step directions with 50% acc given min A to improve receptive language skills. GOAL MET 6/6/18  5. Pt will read common one-syllable words with 50% acc given min A to improve auditory comprehension. GOAL MET 6/6/18  3. Pt will repeat one syllable words with 50% acc given min A. GOAL MET 6/13/18  4. Pt will name objects given binary choice with 50% acc given min A. GOAL MET  6. Pt will participate in automatic speech acts with 50% acc given min A. GOAL MET 6/13/18  4. Pt will name objects with 50% acc given min A. GOAL MET 6/20/18  7. Pt will read 3 word sentences with 80% acc given min A. GOAL MET 7/11/18    Patient Education/Response:   Pt educated on goals and plan of care and reported understanding.     Home Program: Crossword puzzle and follow written directions  from [employer].    Assessment:   Noy is progressing well towards his goals. Improvement in time to complete crossword puzzle. Improvement noted in reading 3 word sentences. Improvement noted in following written directions. Current goals remain appropriate. Goals to be updated as necessary.     Pt prognosis is Good. Pt will continue to benefit from skilled outpatient speech and language therapy to address the deficits listed in the problem list on initial evaluation, provide pt/family education and to maximize pt's level of independence in the home and community environment.     Medical necessity is demonstrated by the following IMPAIRMENTS:  -severe receptive and expressive aphasia  -difficulty following simple 1 step commands  -difficulty naming objects; some improvement noted  -difficulty answering mod-complex yes/no questions; some improvement noted  -difficulty repeating words; improvement noted  -difficulty expressing wants/needs; improvement noted  -difficulty participating in conversation    Environmental Concerns/Cultural/Spiritual/Developmental/Educational Needs:  Pt's spiritual, cultural and educational needs considered and pt agreeable to plan of care and goals.    Plan:   Continue POC with focus on word finding and comprehension.   Updated POC due 7/10/18.    Yvonne Hedrick M.S.CCC-SLP  Speech Language Pathologist   7/11/2018

## 2018-07-13 ENCOUNTER — CLINICAL SUPPORT (OUTPATIENT)
Dept: REHABILITATION | Facility: HOSPITAL | Age: 38
End: 2018-07-13
Attending: FAMILY MEDICINE
Payer: MEDICAID

## 2018-07-13 ENCOUNTER — HOSPITAL ENCOUNTER (EMERGENCY)
Facility: HOSPITAL | Age: 38
Discharge: HOME | End: 2018-07-13
Attending: EMERGENCY MEDICINE
Payer: MEDICAID

## 2018-07-13 VITALS
SYSTOLIC BLOOD PRESSURE: 153 MMHG | WEIGHT: 191 LBS | HEART RATE: 58 BPM | OXYGEN SATURATION: 96 % | RESPIRATION RATE: 16 BRPM | TEMPERATURE: 97 F | HEIGHT: 67 IN | BODY MASS INDEX: 29.98 KG/M2 | DIASTOLIC BLOOD PRESSURE: 85 MMHG

## 2018-07-13 DIAGNOSIS — R07.9 CHEST PAIN: ICD-10-CM

## 2018-07-13 DIAGNOSIS — J90 PLEURAL EFFUSION: Primary | ICD-10-CM

## 2018-07-13 DIAGNOSIS — R47.01 APHASIA: ICD-10-CM

## 2018-07-13 PROBLEM — R07.89 CHEST HEAVINESS: Status: ACTIVE | Noted: 2018-07-13

## 2018-07-13 LAB
ALBUMIN SERPL BCP-MCNC: 3.8 G/DL
ALP SERPL-CCNC: 110 U/L
ALT SERPL W/O P-5'-P-CCNC: 48 U/L
ANION GAP SERPL CALC-SCNC: 6 MMOL/L
AST SERPL-CCNC: 22 U/L
BASOPHILS # BLD AUTO: 0.06 K/UL
BASOPHILS NFR BLD: 1 %
BILIRUB SERPL-MCNC: 0.5 MG/DL
BNP SERPL-MCNC: <10 PG/ML
BUN SERPL-MCNC: 15 MG/DL
CALCIUM SERPL-MCNC: 9.4 MG/DL
CHLORIDE SERPL-SCNC: 107 MMOL/L
CO2 SERPL-SCNC: 29 MMOL/L
CREAT SERPL-MCNC: 1.1 MG/DL
DIFFERENTIAL METHOD: ABNORMAL
EOSINOPHIL # BLD AUTO: 0.1 K/UL
EOSINOPHIL NFR BLD: 2.2 %
ERYTHROCYTE [DISTWIDTH] IN BLOOD BY AUTOMATED COUNT: 17.6 %
EST. GFR  (AFRICAN AMERICAN): >60 ML/MIN/1.73 M^2
EST. GFR  (NON AFRICAN AMERICAN): >60 ML/MIN/1.73 M^2
GLUCOSE SERPL-MCNC: 94 MG/DL
HCT VFR BLD AUTO: 48.2 %
HGB BLD-MCNC: 16.6 G/DL
IMM GRANULOCYTES # BLD AUTO: 0.02 K/UL
IMM GRANULOCYTES NFR BLD AUTO: 0.3 %
LYMPHOCYTES # BLD AUTO: 2.5 K/UL
LYMPHOCYTES NFR BLD: 42 %
MCH RBC QN AUTO: 32.4 PG
MCHC RBC AUTO-ENTMCNC: 34.4 G/DL
MCV RBC AUTO: 94 FL
MONOCYTES # BLD AUTO: 0.5 K/UL
MONOCYTES NFR BLD: 7.5 %
NEUTROPHILS # BLD AUTO: 2.8 K/UL
NEUTROPHILS NFR BLD: 47 %
NRBC BLD-RTO: 0 /100 WBC
PLATELET # BLD AUTO: 183 K/UL
PMV BLD AUTO: 10 FL
POTASSIUM SERPL-SCNC: 4.4 MMOL/L
PROT SERPL-MCNC: 7.5 G/DL
RBC # BLD AUTO: 5.13 M/UL
SODIUM SERPL-SCNC: 142 MMOL/L
TROPONIN I SERPL DL<=0.01 NG/ML-MCNC: <0.006 NG/ML
WBC # BLD AUTO: 5.97 K/UL

## 2018-07-13 PROCEDURE — 93005 ELECTROCARDIOGRAM TRACING: CPT

## 2018-07-13 PROCEDURE — 83880 ASSAY OF NATRIURETIC PEPTIDE: CPT

## 2018-07-13 PROCEDURE — 80053 COMPREHEN METABOLIC PANEL: CPT

## 2018-07-13 PROCEDURE — 99284 EMERGENCY DEPT VISIT MOD MDM: CPT | Mod: ,,, | Performed by: EMERGENCY MEDICINE

## 2018-07-13 PROCEDURE — 99284 EMERGENCY DEPT VISIT MOD MDM: CPT | Mod: 25

## 2018-07-13 PROCEDURE — 85025 COMPLETE CBC W/AUTO DIFF WBC: CPT

## 2018-07-13 PROCEDURE — 92507 TX SP LANG VOICE COMM INDIV: CPT | Mod: PN

## 2018-07-13 PROCEDURE — 93010 ELECTROCARDIOGRAM REPORT: CPT | Mod: ,,, | Performed by: INTERNAL MEDICINE

## 2018-07-13 PROCEDURE — 84484 ASSAY OF TROPONIN QUANT: CPT
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
## 2018-07-13 NOTE — PROGRESS NOTES
Outpatient Neurological Rehabilitation   Speech and Language Therapy Daily Note  Date:  7/13/2018     Start Time:  0933  Stop Time:  1015    Name: Noy Bourne   MRN: [id number]     Therapy Diagnosis:   Encounter Diagnosis   Name Primary?    Aphasia      Physician: Ericka Rainey MD  Physician Orders: speech therapy evaluate and treat  Medical CVA    Visit #:  11  Visits Authorized: 50  Date of Evaluation:  5/10/18  Authorization Period: May 7, 2018 to December 31, 2018  Plan of Care Expiration: 5/10/18 to 7/10/18    Precautions: Standard     History:   Current Medical History: Noy Bourne presents to the [facility] with moderately-markedly impaired language and cognitive skills secondary to the medical diagnosis of CVA.  He was found to have rupture of his pseudoaneurysm and underwent emergency surgery which was complicated by hypoxic respiratory failure and an acute stroke in April 2018 in [place], [place] where he was working at the time. He was reportedly discharged on 4/30/18 and traveled home to [place] the next day.    Subjective:   Pt reports:  Pt pleasant. Pt returned home program.   Pain Scale:  0/10 on VAS currently.   Pain Location: n/a    Objective:   TIMED  Procedure Min.                UNTIMED  Procedure Min.   Speech- Language- Voice Therapy    42 minutes             Total Minutes: 42  Total Timed Units: 0  Total Untimed Units: 1  Charges Billed/# of units: 1    Short Term Goals: (4 weeks) Current Progress:   1. Pt will answer complex yes/no questions with 80% acc given min A to improve receptive language skills.  -Not addressed this session   7. Pt will read 3 word sentences with 80% acc given min A.     GOAL MET X 2  -x 14 with 57% acc independently; 100% acc min A.          8. Pt will identify body parts by pointing with 80% acc given min A.     GOAL MET X 2 7/13/18 -Body parts X 13 with 77% acc independently; 86% min A       9. Pt will follow  simple one-step directions with 90% acc independently to improve receptive language skills   -One-step directions X 11 with 91% acc independently  X 2 repetitions   10. Pt will read two-syllable words with 50% acc given min A to improve auditory comprehension.   -Not addressed this session       11. Pt will repeat one syllable words with 70% acc Independently.    GOAL MET X 2 7/13/18 -Repeat one syllable words X 15 with 87% acc independently; 100% acc min A   12. Pt will name objects with 60% acc. Independently    GOAL MET X 2 -x 20 with 80% acc Independently; 90% acc min A          13. Pt will follow written directions with 50% acc given min A.     GOAL MET X 2  -x 11 with 73% acc independently; 91% acc min A       14. Pt will name concrete category members x 10 in 60 seconds with 70% acc given min A to improve word finding Animals- X 12 Independently in 60 seconds;  x 17 min A untimed  Things at the beach- X 6 Independently in 60 seconds  Food X 3 independently in 60 seconds  Names X 12 independently in 60 seconds         Possible future goals: letter identification, writing letters     GOALS MET   8. Pt will identify body parts by pointing with 80% acc given min A. 7/13/18  11. Pt will repeat one syllable words with 70% acc Independently. 7/13/18    Patient Education/Response:   Pt educated on goals and plan of care and reported understanding.     Home Program: Crossword puzzle and follow written directions from [employer].    Assessment:   Noy is progressing well towards his goals. Two goals met today. Improvement noted in reading 3 word sentences. Improvement noted in following written directions. Current goals remain appropriate. Goals to be updated as necessary.     Pt prognosis is Good. Pt will continue to benefit from skilled outpatient speech and language therapy to address the deficits listed in the problem list on initial evaluation, provide pt/family education and to maximize pt's  level of independence in the home and community environment.     Medical necessity is demonstrated by the following IMPAIRMENTS:  -severe receptive and expressive aphasia  -difficulty following simple 1 step commands  -difficulty naming objects; some improvement noted  -difficulty answering mod-complex yes/no questions; some improvement noted  -difficulty repeating words; improvement noted  -difficulty expressing wants/needs; improvement noted  -difficulty participating in conversation    Environmental Concerns/Cultural/Spiritual/Developmental/Educational Needs:  Pt's spiritual, cultural and educational needs considered and pt agreeable to plan of care and goals.    Plan:   Continue POC with focus on word finding and comprehension.   Updated POC due 9/10/18.    MICHLELE Juarez   Clinician  [facility]    I certify that I was present in the room directing the student in service delivery and guiding them using my skilled judgment. As the co-signing therapist I have reviewed the students documentation and am responsible for the treatment, assessment, and plan.     Yvonne Hedrick M.S.CCC-SLP  Speech Language Pathologist   7/13/2018

## 2018-07-13 NOTE — ED TRIAGE NOTES
Pt had Aorta repair in April. Pt was told he needs a thoracentesis and has been June 21st to schedule procedure.
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
## 2018-07-14 NOTE — ASSESSMENT & PLAN NOTE
Patient with known pulmonary fluid collection with plans to follow up with thoracic surgery at [facility]. Does not appear to be recurrent diaphragmatic hernia. WBC and lack of fever not suggestive of empyema. Fluid collection likely organized hematoma versus pseudoaneurysm. CTA did not show communication between fluid collection and aorta.    Patient stable at this time on room air. No need for hospital admission, plan to follow up with Thoracic Surgery in clinic. Patient would like to transfer care to [facility], will sent up outpatient follow up.

## 2018-07-14 NOTE — SUBJECTIVE & OBJECTIVE
No current facility-administered medications on file prior to encounter.      Current Outpatient Prescriptions on File Prior to Encounter   Medication Sig    aspirin (ECOTRIN) 81 MG EC tablet Take 1 tablet (81 mg total) by mouth once daily.    atorvastatin (LIPITOR) 80 MG tablet Take 1 tablet (80 mg total) by mouth once daily.    clopidogrel (PLAVIX) 75 mg tablet Take 1 tablet (75 mg total) by mouth once daily.    fluticasone (FLONASE) 50 mcg/actuation nasal spray 1 spray (50 mcg total) by Each Nare route once daily.    folic acid (FOLVITE) 1 MG tablet Take 1 tablet (1 mg total) by mouth once daily.    metoprolol succinate (TOPROL-XL) 50 MG 24 hr tablet Take 1 tablet (50 mg total) by mouth once daily.    naproxen (NAPROSYN) 500 MG tablet Take 1 tablet (500 mg total) by mouth daily as needed.    valsartan (DIOVAN) 40 MG tablet Take 1 tablet (40 mg total) by mouth 2 (two) times daily.    aspirin 81 MG Chew Take 81 mg by mouth once daily.    atorvastatin (LIPITOR) 80 MG tablet Take 80 mg by mouth once daily.    clopidogrel (PLAVIX) 75 mg tablet Take 75 mg by mouth once daily.    escitalopram oxalate (LEXAPRO) 10 MG tablet Take 1 tablet (10 mg total) by mouth once daily.    folic acid (FOLVITE) 1 MG tablet Take 1 mg by mouth once daily.    metoprolol succinate (TOPROL-XL) 50 MG 24 hr tablet Take 50 mg by mouth once daily.    valsartan (DIOVAN) 80 MG tablet Take 80 mg by mouth once daily.       Review of patient's allergies indicates:  No Known Allergies    Past Medical History:   Diagnosis Date    DVT (deep venous thrombosis)     High cholesterol     Hypertension     Stroke      Past Surgical History:   Procedure Laterality Date    ABDOMINAL SURGERY      AORTIC ARCH REPAIR      AORTIC VALVE SURGERY      DIAPHRAGM SURGERY       Family History     Problem Relation (Age of Onset)    Cancer Father    Diabetes Mother    Hypertension Mother        Social History Main Topics    Smoking status: Former  Smoker    Smokeless tobacco: Never Used      Comment: quit smoking 7.11.18    Alcohol use 1.2 oz/week     2 Cans of beer per week    Drug use: No    Sexual activity: Not on file     Review of Systems   Constitutional: Negative for chills and fever.   HENT: Negative for congestion and nosebleeds.    Respiratory: Positive for cough. Negative for shortness of breath.    Cardiovascular: Negative for chest pain and palpitations.   Gastrointestinal: Negative for abdominal distention and abdominal pain.   Neurological: Negative for dizziness and headaches.   Psychiatric/Behavioral: Negative for agitation and confusion.     Objective:     Vital Signs (Most Recent):  Temp: 97.4 °F (36.3 °C) (07/13/18 2235)  Pulse: 64 (07/13/18 2215)  Resp: (!) 22 (07/13/18 2215)  BP: (!) 153/116 (07/13/18 2215)  SpO2: 95 % (07/13/18 2215) Vital Signs (24h Range):  Temp:  [97.4 °F (36.3 °C)-98 °F (36.7 °C)] 97.4 °F (36.3 °C)  Pulse:  [58-68] 64  Resp:  [16-32] 22  SpO2:  [94 %-99 %] 95 %  BP: (137-169)/() 153/116     Weight: 86.6 kg (191 lb)  Body mass index is 29.91 kg/m².    Physical Exam   Constitutional: He is oriented to person, place, and time. He appears well-developed and well-nourished. No distress.   Cardiovascular: Normal rate and regular rhythm.    Pulmonary/Chest: Effort normal. He has no rales. He exhibits no tenderness.   Decreased breath sounds in left lower lung field   Abdominal: Soft. He exhibits no distension. There is no tenderness.   Well healed midline incision   Neurological: He is alert and oriented to person, place, and time.   Skin: Skin is dry.   Psychiatric: He has a normal mood and affect. His behavior is normal.       Significant Labs:  CBC:   Recent Labs  Lab 07/13/18 1915   WBC 5.97   RBC 5.13   HGB 16.6   HCT 48.2      MCV 94   MCH 32.4*   MCHC 34.4     CMP:   Recent Labs  Lab 07/13/18 2145   GLU 94   CALCIUM 9.4   ALBUMIN 3.8   PROT 7.5      K 4.4   CO2 29      BUN 15    CREATININE 1.1   ALKPHOS 110   ALT 48*   AST 22   BILITOT 0.5       Significant Diagnostics:  I have reviewed all pertinent imaging results/findings within the past 24 hours.
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
## 2018-07-16 ENCOUNTER — CLINICAL SUPPORT (OUTPATIENT)
Dept: REHABILITATION | Facility: HOSPITAL | Age: 38
End: 2018-07-16
Attending: FAMILY MEDICINE
Payer: MEDICAID

## 2018-07-16 DIAGNOSIS — R47.01 APHASIA: ICD-10-CM

## 2018-07-16 DIAGNOSIS — J90 PLEURAL EFFUSION: Primary | ICD-10-CM

## 2018-07-16 PROCEDURE — 92507 TX SP LANG VOICE COMM INDIV: CPT | Mod: PN

## 2018-07-16 NOTE — PROGRESS NOTES
Outpatient Neurological Rehabilitation   Speech and Language Therapy Daily Note  Date:  7/16/2018     Start Time:  0858  Stop Time:  0930    Name: Noy Bourne   MRN: [id number]     Therapy Diagnosis:   Encounter Diagnosis   Name Primary?    Aphasia      Physician: Ericka Rainey MD  Physician Orders: speech therapy evaluate and treat  Medical CVA    Visit #:  12  Visits Authorized: 50  Date of Evaluation:  5/10/18  Authorization Period: May 7, 2018 to December 31, 2018  Plan of Care Expiration: 7/10/18 to 9/10/18    Precautions: Standard     History:   Current Medical History: Noy Bourne presents to the [facility] with moderately-markedly impaired language and cognitive skills secondary to the medical diagnosis of CVA.  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 He was reportedly discharged on 4/30/18 and traveled home to [place] the next day.    Subjective:   Pt reports:  Pt pleasant. Pt returned home program.   Pain Scale:  No numerical value given    Pain Location: Pt reported having a headache    Objective:   TIMED  Procedure Min.                UNTIMED  Procedure Min.   Speech- Language- Voice Therapy    32 minutes             Total Minutes: 32  Total Timed Units: 0  Total Untimed Units: 1  Charges Billed/# of units: 1    Short Term Goals: (4 weeks) Current Progress:   1. Pt will answer complex yes/no questions with 80% acc given min A to improve receptive language skills.  -Moderate yes/no questions X 15 with 80% acc min A;  X 4 repetitions; X 2 self correct    -Complex yes/no questions X 5 with 40% acc independently; 80% acc mod A   9. Pt will follow simple one-step directions with 90% acc independently to improve receptive language skills   -Not addressed  this session   10. Pt will read two-syllable words with 50% acc given min A to improve auditory comprehension.   -2-syllable words X 20 with 50% acc independently; 80% acc min A       14. Pt will name concrete category members x 10 in 60 seconds with 70% acc given min A to improve word finding Animals- X 12 Independently in 60 seconds;  x 13 min A untimed  Food X 5 independently in 60 seconds; X 10 untimed  Singers X 10 independently in 60 seconds; X 12 untimed       15. Pt will name objects with 80% acc Independently.   Name objects X 10 with 80% acc. Independently; 100% acc min A   16. Pt will follow written directions with 80% acc given min A.   -Not addressed this sesson     Possible future goals: letter identification, writing letters     GOALS MET   8. Pt will identify body parts by pointing with 80% acc given min A. 7/13/18  11. Pt will repeat one syllable words with 70% acc Independently. 7/13/18    Patient Education/Response:   Pt educated on goals and plan of care and reported understanding.     Home Program: Crossword puzzle and follow written directions from [employer].    Assessment:   Noy is progressing well towards his goals. Improvement noted in naming common objects. Improvement noted in answering moderate yes/no questions. Current goals remain appropriate. Goals to be updated as necessary.     Pt prognosis is Good. Pt will continue to benefit from skilled outpatient speech and language therapy to address the deficits listed in the problem list on initial evaluation, provide pt/family education and to maximize pt's level of independence in the home and community environment.     Medical necessity is demonstrated by the following IMPAIRMENTS:  -severe receptive and expressive aphasia  -difficulty following simple 1 step commands  -difficulty naming objects; some improvement noted  -difficulty answering mod-complex yes/no questions; some improvement noted  -difficulty repeating  words; improvement noted  -difficulty expressing wants/needs; improvement noted  -difficulty participating in conversation    Environmental Concerns/Cultural/Spiritual/Developmental/Educational Needs:  Pt's spiritual, cultural and educational needs considered and pt agreeable to plan of care and goals.    Plan:   Continue POC with focus on word finding and comprehension.   Updated POC due 9/10/18.    MICHELLE Juarez   Clinician  [facility]    I certify that I was present in the room directing the student in service delivery and guiding them using my skilled judgment. As the co-signing therapist I have reviewed the students documentation and am responsible for the treatment, assessment, and plan.     Yvonne Hedrick M.S.CCC-SLP  Speech Language Pathologist   7/16/2018

## 2018-07-16 NOTE — PROGRESS NOTES
Received call from patient's wife regarding outpatient appointment for left lung base fluid collection. Mr. Bourne was seen over the weekend by a resident and Dr. Malik and is requesting the next available appointment with a thoracic surgeon. He has a complicated past medical and surgical history. Most of which was treated at [facility] and [facility]. Patient's wife is requesting his care be transitioned to [facility]. Outside records available through [facility] but asked patient to bring any old imaging she has on a disc to the appointment. I did inform wife any potential surgery could be delayed as patient is currently taking Plavix and ASA 81mg. Offered for patient to see Dr. Correa this week with PFTS.

## 2018-07-18 ENCOUNTER — CLINICAL SUPPORT (OUTPATIENT)
Dept: REHABILITATION | Facility: HOSPITAL | Age: 38
End: 2018-07-18
Attending: FAMILY MEDICINE
Payer: MEDICAID

## 2018-07-18 DIAGNOSIS — R47.01 APHASIA: ICD-10-CM

## 2018-07-18 PROCEDURE — 92507 TX SP LANG VOICE COMM INDIV: CPT | Mod: PN

## 2018-07-18 NOTE — PROGRESS NOTES
Outpatient Neurological Rehabilitation   Speech and Language Therapy Daily Note  Date:  7/18/2018     Start Time:  0858  Stop Time:  0930    Name: Noy Bourne   MRN: [id number]     Therapy Diagnosis:   No diagnosis found.  Physician: Ericka Rainey MD  Physician Orders: speech therapy evaluate and treat  Medical CVA    Visit #:  13  Visits Authorized: 50  Date of Evaluation:  5/10/18  Authorization Period: May 7, 2018 to December 31, 2018  Plan of Care Expiration: 7/10/18 to 9/10/18    Precautions: Standard     History:   Current Medical History: Noy Bourne presents to the [facility] with moderately-markedly impaired language and cognitive skills secondary to the medical diagnosis of CVA.  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 He was reportedly discharged on 4/30/18 and traveled home to [place] the next day.    Subjective:   Pt reports:  Pt pleasant. Pt did not bring back home program.   Pain Scale: 0/10    Pain Location:     Objective:   TIMED  Procedure Min.                UNTIMED  Procedure Min.   Speech- Language- Voice Therapy    32 minutes             Total Minutes: 32  Total Timed Units: 0  Total Untimed Units: 1  Charges Billed/# of units: 1    Short Term Goals: (4 weeks) Current Progress:   1. Pt will answer complex yes/no questions with 80% acc given min A to improve receptive language skills.  -Moderate yes/no questions X 12 with 75% acc Independently; 83% acc min A  X 5 repetitions  -Complex yes/no questions- Not addressed this session   9. Pt will follow simple one-step directions with 90% acc independently to improve receptive language skills   -Follow simple one-step directions X 10 with 80% acc Independently; 100% acc min A   10. Pt will read  two-syllable words with 50% acc given min A to improve auditory comprehension.   -2-syllable words X 20 with 80% acc independently; 92% acc min A    GOAL MET X 2   14. Pt will name concrete category members x 10 in 60 seconds with 70% acc given min A to improve word finding Animals- X 10 Independently in 60 seconds;  x 13 min A untimed  Things in a bathroom X 4 independently in 60 seconds; X 10 untimed       15. Pt will name objects with 80% acc Independently.   Name objects X 15 with 60% acc Independently; 83% acc min A  -more uncommon objects used this session     16. Pt will follow written directions with 80% acc given min A.   -Follow written directions X 15 with 80% independently; 100% acc min A   GOAL MET x 1 session   Additional:   Pt was able to write the letters of the alphabet with intermittent visual stimulus present.  Pt was able to write his name, city, and state with min A.    Possible future goals: letter identification, writing letters     GOALS MET   8. Pt will identify body parts by pointing with 80% acc given min A. 7/13/18  11. Pt will repeat one syllable words with 70% acc Independently. 7/13/18    Patient Education/Response:   Pt educated on goals and plan of care and reported understanding.     Home Program: Crossword puzzle and follow written directions from [employer].    Assessment:   Noy is progressing well towards his goals. Goal met x 2 sessions for reading 2-syllable words independently. Decrease in following 1-step directions. Decrease in answering moderate yes/no questions. Follow written directions goal met x 1 session.  Current goals remain appropriate. Goals to be updated as necessary.     Pt prognosis is Good. Pt will continue to benefit from skilled outpatient speech and language therapy to address the deficits listed in the problem list on initial evaluation, provide pt/family education and to maximize pt's level of independence in the home and community  environment.     Medical necessity is demonstrated by the following IMPAIRMENTS:  -severe receptive and expressive aphasia  -difficulty following simple 1 step commands  -difficulty naming objects; some improvement noted  -difficulty answering mod-complex yes/no questions; some improvement noted  -difficulty repeating words; improvement noted  -difficulty expressing wants/needs; improvement noted  -difficulty participating in conversation    Environmental Concerns/Cultural/Spiritual/Developmental/Educational Needs:  Pt's spiritual, cultural and educational needs considered and pt agreeable to plan of care and goals.    Plan:   Continue POC with focus on word finding and comprehension.   Updated POC due 9/10/18.    Claudette Snyder BA   Clinician  [facility]    I certify that I was present in the room directing the student in service delivery and guiding them using my skilled judgment. As the co-signing therapist I have reviewed the students documentation and am responsible for the treatment, assessment, and plan.     Yvonne Hedrick M.S.CCC-SLP  Speech Language Pathologist   7/18/2018

## 2018-07-30 ENCOUNTER — TELEPHONE (OUTPATIENT)
Dept: REHABILITATION | Facility: HOSPITAL | Age: 38
End: 2018-07-30

## 2018-08-03 ENCOUNTER — TELEPHONE (OUTPATIENT)
Dept: REHABILITATION | Facility: HOSPITAL | Age: 38
End: 2018-08-03

## 2018-08-08 ENCOUNTER — CLINICAL SUPPORT (OUTPATIENT)
Dept: REHABILITATION | Facility: HOSPITAL | Age: 38
End: 2018-08-08
Attending: FAMILY MEDICINE
Payer: MEDICAID

## 2018-08-08 DIAGNOSIS — R47.01 APHASIA: ICD-10-CM

## 2018-08-08 PROCEDURE — 92507 TX SP LANG VOICE COMM INDIV: CPT | Mod: PN

## 2018-08-08 NOTE — PROGRESS NOTES
Outpatient Neurological Rehabilitation   Speech and Language Therapy Daily Note  Date:  8/8/2018     Start Time:  0855  Stop Time:  0930    Name: Noy Bourne   MRN: [id number]     Therapy Diagnosis:   Encounter Diagnosis   Name Primary?    Aphasia      Physician: Ericka Rainey MD  Physician Orders: speech therapy evaluate and treat  Medical CVA    Visit #:  14  Visits Authorized: 50  Date of Evaluation:  5/10/18  Authorization Period: May 7, 2018 to December 31, 2018  Plan of Care Expiration: 7/10/18 to 9/10/18    Precautions: Standard     History:   Current Medical History: Noy Bourne presents to the [facility] with moderately-markedly impaired language and cognitive skills secondary to the medical diagnosis of CVA.  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 He was reportedly discharged on 4/30/18 and traveled home to [place] the next day.    Subjective:   Pt reports:  Pt pleasant. Pt did not bring back home program.   Pain Scale: 0/10    Pain Location:     Objective:   TIMED  Procedure Min.                UNTIMED  Procedure Min.   Speech- Language- Voice Therapy    35 minutes             Total Minutes: 35  Total Timed Units: 0  Total Untimed Units: 1  Charges Billed/# of units: 1    Short Term Goals: (4 weeks) Current Progress:   1. Pt will answer complex yes/no questions with 80% acc given min A to improve receptive language skills.  -Moderate yes/no questions X 20 with 50% acc Independently; 70% acc min A  -Complex yes/no questions x 10 with 60% acc Independently; 80% acc min A     9. Pt will follow simple one-step directions with 90% acc independently to improve receptive language skills   -Follow simple one-step directions -Not addressed this session.    14.  Pt will name concrete category members x 10 in 60 seconds with 70% acc given min A to improve word finding Animals- X 10 Independently in 60 seconds;  x 13 min A untimed  Things in a bathroom X 4 independently in 60 seconds; X 10 untimed       15. Pt will name objects with 80% acc Independently.   Name objects X 20 with 75% acc Independently; 90% acc min A     16. Pt will follow written directions with 80% acc given min A. GOAL MET x 2 sessions   -Follow written directions x 4 with 100% acc   Additional:   Pt was able to write the letters of the alphabet with 19/26 accuracy Independently   Pt was able to write his name, address, wife name, city, and state with min A. Difficulty writing names of children and phone number.   Written expression x 14 with 43% acc; 93% acc min-mod A    Possible future goals: letter identification, writing letters     GOALS MET   8. Pt will identify body parts by pointing with 80% acc given min A. 7/13/18  11. Pt will repeat one syllable words with 70% acc Independently. 7/13/18  10. Pt will read two-syllable words with 50% acc given min A to improve auditory comprehension.    Patient Education/Response:   Pt educated on goals and plan of care and reported understanding.     Home Program: Crossword puzzle and follow written directions from [employer].  Naming objects and following written directions from [employer].    Assessment:   Noy is progressing well towards his goals. Increase naming objects and following written directions.  Decrease answering moderate yes/no questions. Current goals remain appropriate. Goals to be updated as necessary.     Pt prognosis is Good. Pt will continue to benefit from skilled outpatient speech and language therapy to address the deficits listed in the problem list on initial evaluation, provide pt/family education and to maximize pt's level of independence in the home and community environment.     Medical  necessity is demonstrated by the following IMPAIRMENTS:  -severe receptive and expressive aphasia  -difficulty following simple 1 step commands  -difficulty naming objects; some improvement noted  -difficulty answering mod-complex yes/no questions; some improvement noted  -difficulty repeating words; improvement noted  -difficulty expressing wants/needs; improvement noted  -difficulty participating in conversation    Environmental Concerns/Cultural/Spiritual/Developmental/Educational Needs:  Pt's spiritual, cultural and educational needs considered and pt agreeable to plan of care and goals.    Plan:   Continue POC with focus on word finding and comprehension.   Updated POC due 9/10/18.    MICHELLE Juarez   Clinician  [facility]    I certify that I was present in the room directing the student in service delivery and guiding them using my skilled judgment. As the co-signing therapist I have reviewed the students documentation and am responsible for the treatment, assessment, and plan.     Yvonne Hedrick M.S.CCC-SLP  Speech Language Pathologist   8/8/2018

## 2018-08-09 ENCOUNTER — OFFICE VISIT (OUTPATIENT)
Dept: CARDIOLOGY | Facility: CLINIC | Age: 38
End: 2018-08-09
Payer: MEDICAID

## 2018-08-09 ENCOUNTER — TELEPHONE (OUTPATIENT)
Dept: CARDIOLOGY | Facility: CLINIC | Age: 38
End: 2018-08-09

## 2018-08-09 VITALS
WEIGHT: 207 LBS | BODY MASS INDEX: 32.49 KG/M2 | DIASTOLIC BLOOD PRESSURE: 100 MMHG | HEIGHT: 67 IN | OXYGEN SATURATION: 98 % | HEART RATE: 62 BPM | SYSTOLIC BLOOD PRESSURE: 162 MMHG

## 2018-08-09 DIAGNOSIS — I50.22 CHRONIC SYSTOLIC CONGESTIVE HEART FAILURE: ICD-10-CM

## 2018-08-09 DIAGNOSIS — R47.01 APHASIA: ICD-10-CM

## 2018-08-09 DIAGNOSIS — I71.9 PSEUDOANEURYSM OF AORTA: ICD-10-CM

## 2018-08-09 DIAGNOSIS — I10 ESSENTIAL HYPERTENSION: Primary | ICD-10-CM

## 2018-08-09 DIAGNOSIS — R94.30 CARDIAC LV EJECTION FRACTION OF 40-49%: ICD-10-CM

## 2018-08-09 PROCEDURE — 99215 OFFICE O/P EST HI 40 MIN: CPT | Mod: S$PBB,,, | Performed by: INTERNAL MEDICINE

## 2018-08-09 PROCEDURE — 99999 PR PBB SHADOW E&M-EST. PATIENT-LVL III: CPT | Mod: PBBFAC,,, | Performed by: INTERNAL MEDICINE

## 2018-08-09 PROCEDURE — 99213 OFFICE O/P EST LOW 20 MIN: CPT | Mod: PBBFAC,PN | Performed by: INTERNAL MEDICINE

## 2018-08-09 RX ORDER — VALSARTAN 160 MG/1
160 TABLET ORAL 2 TIMES DAILY
Qty: 180 TABLET | Refills: 3 | Status: SHIPPED | OUTPATIENT
Start: 2018-08-09 | End: 2018-08-09 | Stop reason: ALTCHOICE

## 2018-08-09 RX ORDER — LOSARTAN POTASSIUM 100 MG/1
100 TABLET ORAL DAILY
Qty: 90 TABLET | Refills: 3 | Status: SHIPPED | OUTPATIENT
Start: 2018-08-09 | End: 2018-08-10 | Stop reason: SDUPTHER

## 2018-08-09 NOTE — TELEPHONE ENCOUNTER
----- Message from Keyanna Ayon sent at 8/9/2018 11:17 AM CDT -----  Contact: [phone]/Madina  Patients wife states that patient need to see the doctor as soon as possible for med check and states that his blood pressure is high.  Call Madina at [phone].
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
## 2018-08-09 NOTE — PATIENT INSTRUCTIONS
Assessment/Plan:  Noy Bourne is a 37 y.o. male with a past medical history of HTN, DVT (with IVC filter in place), thoracic aortic aneurysm s/p repair and CVA, who presents for a follow up appointment.     1. Thoracic aortic aneurysm s/p repair and CVA-  Mechanism of stroke unknown.  Continue ASA, statin, plavix, beta blocker.    2. Depressed EF- Nuclear stress test from 6/28/2018 shows global hypokinesis with a low stress ejection fraction and fixed inferior wall perfusion defect.  Increase valsartan to 160 mg bid.  Continue metoprolol succinate 50 mg daily.      3. HTN- Increase valsartan to 160 mg bid.  Continue metoprolol succinate 50 mg daily.  Pt to continue keeping log of blood pressure/heart rate and bring in next visit for review.       Follow up in 3 months

## 2018-08-10 ENCOUNTER — CLINICAL SUPPORT (OUTPATIENT)
Dept: REHABILITATION | Facility: HOSPITAL | Age: 38
End: 2018-08-10
Attending: FAMILY MEDICINE
Payer: MEDICAID

## 2018-08-10 ENCOUNTER — TELEPHONE (OUTPATIENT)
Dept: CARDIOLOGY | Facility: CLINIC | Age: 38
End: 2018-08-10

## 2018-08-10 DIAGNOSIS — R47.01 APHASIA: ICD-10-CM

## 2018-08-10 PROCEDURE — 92507 TX SP LANG VOICE COMM INDIV: CPT | Mod: PN

## 2018-08-10 RX ORDER — LOSARTAN POTASSIUM 100 MG/1
100 TABLET ORAL DAILY
Qty: 90 TABLET | Refills: 3 | Status: SHIPPED | OUTPATIENT
Start: 2018-08-10 | End: 2018-09-21

## 2018-08-10 NOTE — TELEPHONE ENCOUNTER
----- Message from Marialuisa Hurst sent at 8/10/2018  8:55 AM CDT -----  Contact: Madina (wife)/ [phone]  Patient's wife called to let you know the pharmacy has not received patient's medication. losartan (COZAAR) 100 MG tablet    Please resend prescription.    [facility] [address] [place] [address], [place] - [address] [facility]

## 2018-08-10 NOTE — PROGRESS NOTES
Outpatient Neurological Rehabilitation   Speech and Language Therapy Daily Note  Date:  8/10/2018     Start Time:  0855  Stop Time:  0930    Name: Noy Bourne   MRN: [id number]     Therapy Diagnosis:   Encounter Diagnosis   Name Primary?    Aphasia      Physician: Ericka Rainey MD  Physician Orders: speech therapy evaluate and treat  Medical CVA    Visit #:  15  Visits Authorized: 50  Date of Evaluation:  5/10/18  Authorization Period: May 7, 2018 to December 31, 2018  Plan of Care Expiration: 7/10/18 to 9/10/18    Precautions: Standard     History:   Current Medical History: Noy Bourne presents to the [facility] with moderately-markedly impaired language and cognitive skills secondary to the medical diagnosis of CVA.  He was found to have rupture of his pseudoaneurysm and underwent emergency surgery which was complicated by hypoxic respiratory failure and an acute stroke in April 2018 in [place], [place] where he was working at the time. He was reportedly discharged on 4/30/18 and traveled home to [place] the next day.    Subjective:   Pt reports:  Pt pleasant. Pt did not bring back home program.   Pain Scale: 0/10    Pain Location:     Objective:   TIMED  Procedure Min.                UNTIMED  Procedure Min.   Speech- Language- Voice Therapy    35 minutes             Total Minutes: 35  Total Timed Units: 0  Total Untimed Units: 1  Charges Billed/# of units: 1    Short Term Goals: (4 weeks) Current Progress:   1. Pt will answer complex yes/no questions with 80% acc given min A to improve receptive language skills.  -Moderate yes/no questions -Not addressed this session.   -Complex yes/no questions x 10 with 90% acc Independently presented with a slow rate of speech; GOAL MET x 1 session     9. Pt will follow simple one-step directions with 90% acc independently to improve receptive language skills   -Follow simple one-step directions  presented at a slow rate of speech x 10 with 90% acc; GOAL MET x 1 session  -Follow simple two step directions presented at a slow rate of speech x 5 with 60% acc   14. Pt will name concrete category members x 10 in 60 seconds with 70% acc given min A to improve word finding Sports x 7 Independently in 60 seconds; perseveration x 2  Things at [employer] x 15 in 60 seconds mod A     15. Pt will name objects with 80% acc Independently.   Name objects X 20 with 80% acc Independently; 1 self correction and 1 delay noted     Additional:   Develop a sentence verbally given one word x 3 with mod A  Write a sentence given a word verbally x 3 with mod A  Write numbers 1-10 with 100% acc; 1 self correction  Write # mixed x 9 with 67% acc  Pt reported difficulty with clocks.     Possible future goals: letter identification, writing letters     GOALS MET   8. Pt will identify body parts by pointing with 80% acc given min A. 7/13/18  11. Pt will repeat one syllable words with 70% acc Independently. 7/13/18  10. Pt will read two-syllable words with 50% acc given min A to improve auditory comprehension.  16. Pt will follow written directions with 80% acc given min A. GOAL MET x 2 sessions    Patient Education/Response:   Pt educated on goals and plan of care and reported understanding.     Home Program: Crossword puzzle and follow written directions from [employer].  Naming objects and following written directions from [employer].    Assessment:   Noy is progressing well towards his goals. Increase following complex directions and following simple 1 step commands; note both presented with significantly decreased rate of speech.  Decrease answering moderate yes/no questions. Current goals remain appropriate. Goals to be updated as necessary.     Pt prognosis is Good. Pt will continue to benefit from skilled outpatient speech and language therapy to address the deficits listed in the problem  list on initial evaluation, provide pt/family education and to maximize pt's level of independence in the home and community environment.     Medical necessity is demonstrated by the following IMPAIRMENTS:  -severe receptive and expressive aphasia  -difficulty following simple 1 step commands  -difficulty naming objects; some improvement noted  -difficulty answering mod-complex yes/no questions; some improvement noted  -difficulty repeating words; improvement noted  -difficulty expressing wants/needs; improvement noted  -difficulty participating in conversation    Environmental Concerns/Cultural/Spiritual/Developmental/Educational Needs:  Pt's spiritual, cultural and educational needs considered and pt agreeable to plan of care and goals.    Plan:   Continue POC with focus on word finding and comprehension.   Updated POC due 9/10/18.      Yvonne Hedrick M.S.CCC-SLP  Speech Language Pathologist   8/10/2018

## 2018-08-15 ENCOUNTER — TELEPHONE (OUTPATIENT)
Dept: REHABILITATION | Facility: HOSPITAL | Age: 38
End: 2018-08-15

## 2018-08-22 ENCOUNTER — CLINICAL SUPPORT (OUTPATIENT)
Dept: REHABILITATION | Facility: HOSPITAL | Age: 38
End: 2018-08-22
Attending: FAMILY MEDICINE
Payer: MEDICAID

## 2018-08-22 DIAGNOSIS — R47.01 APHASIA: ICD-10-CM

## 2018-08-22 PROCEDURE — 92507 TX SP LANG VOICE COMM INDIV: CPT | Mod: PN

## 2018-08-22 NOTE — PROGRESS NOTES
Outpatient Neurological Rehabilitation   Speech and Language Therapy Daily Note  Date:  8/22/2018     Start Time:  0855  Stop Time:  0930    Name: Noy Bourne   MRN: [id number]     Therapy Diagnosis:   Encounter Diagnosis   Name Primary?    Aphasia      Physician: Ericka Rainey MD  Physician Orders: speech therapy evaluate and treat  Medical CVA    Visit #:  16  Visits Authorized: 50  Date of Evaluation:  5/10/18  Authorization Period: May 7, 2018 to December 31, 2018  Plan of Care Expiration: 7/10/18 to 9/10/18    Precautions: Standard     History:   Current Medical History: Noy Bourne presents to the [facility] with moderately-markedly impaired language and cognitive skills secondary to the medical diagnosis of CVA.  He was found to have rupture of his pseudoaneurysm and underwent emergency surgery which was complicated by hypoxic respiratory failure and an acute stroke in April 2018 in [place], [place] where he was working at the time. He was reportedly discharged on 4/30/18 and traveled home to [place] [facility] the next day.    Subjective:   Pt reports:  Pt pleasant. Pt did not bring back home program. 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    Pain Scale: 0/10    Pain Location:     Objective:   TIMED  Procedure Min.                UNTIMED  Procedure Min.   Speech- Language- Voice Therapy    35 minutes             Total Minutes: 35  Total Timed Units: 0  Total Untimed Units: 1  Charges Billed/# of units: 1    Short Term Goals: (4 weeks) Current Progress:   1. Pt will answer complex yes/no questions with 80% acc given min A to improve receptive language skills.  -Moderate yes/no questions -50% acc Independently, 60% with min A   -Complex yes/no questions x 10 with 90% acc Independently presented with a slow rate of speech; GOAL MET x 1 session     9. Pt will follow simple one-step directions with 90% acc independently to  improve receptive language skills   -Follow simple one-step directions -Not addressed this session.   -Follow simple two step directions--Not addressed this session.   14. Pt will name concrete category members x 10 in 60 seconds with 70% acc given min A to improve word finding Sports x 3 Independently in 60 seconds, 6 with mod A   Things at [employer] x 7 Independently in 60 seconds, 13 with mod A  Things in a Bathroom x 4 Independently in 60 seconds, 8 with min A       15. Pt will name objects with 80% acc Independently.   -Objects in the Room x 8 Independently in 60 seconds, 13 with min assistance     Additional:   Develop a sentence verbally given one word--Not addressed this session.  Write a sentence given a word verbally--Not addressed this session.   Write numbers 1-10--Not addressed this session.   Write # mixed --Not addressed this session.    Possible future goals: letter identification, writing letters     GOALS MET   8. Pt will identify body parts by pointing with 80% acc given min A. 7/13/18  11. Pt will repeat one syllable words with 70% acc Independently. 7/13/18  10. Pt will read two-syllable words with 50% acc given min A to improve auditory comprehension.  16. Pt will follow written directions with 80% acc given min A. GOAL MET x 2 sessions    Patient Education/Response:   Pt educated on goals and plan of care and reported understanding.     Home Program: Crossword puzzle and follow written directions from [employer]  Naming objects and following written directions from [employer]    Assessment:   Noy is progressing well towards his goals.  Decreased naming concrete category members.  Slight decrease in answering moderate y/n questions.  Current goals remain appropriate. Goals to be updated as necessary.     Pt prognosis is Good. Pt will continue to benefit from skilled outpatient speech and language therapy to address the deficits listed in the problem  list on initial evaluation, provide pt/family education and to maximize pt's level of independence in the home and community environment.     Medical necessity is demonstrated by the following IMPAIRMENTS:  -severe receptive and expressive aphasia  -difficulty following simple 1 step commands  -difficulty naming objects; some improvement noted  -difficulty answering mod-complex yes/no questions; some improvement noted  -difficulty repeating words; improvement noted  -difficulty expressing wants/needs; improvement noted  -difficulty participating in conversation    Environmental Concerns/Cultural/Spiritual/Developmental/Educational Needs:  Pt's spiritual, cultural and educational needs considered and pt agreeable to plan of care and goals.    Plan:   Continue POC with focus on word finding and comprehension.   Updated POC due 9/10/18.      Yvonne Hedrick M.S.CCC-SLP  Speech Language Pathologist   8/22/2018

## 2018-09-21 ENCOUNTER — OFFICE VISIT (OUTPATIENT)
Dept: FAMILY MEDICINE | Facility: HOSPITAL | Age: 38
End: 2018-09-21
Attending: FAMILY MEDICINE
Payer: MEDICAID

## 2018-09-21 VITALS
HEIGHT: 69 IN | WEIGHT: 209.88 LBS | DIASTOLIC BLOOD PRESSURE: 116 MMHG | SYSTOLIC BLOOD PRESSURE: 170 MMHG | BODY MASS INDEX: 31.09 KG/M2 | HEART RATE: 59 BPM

## 2018-09-21 DIAGNOSIS — I71.20 THORACIC AORTIC ANEURYSM WITHOUT RUPTURE: ICD-10-CM

## 2018-09-21 DIAGNOSIS — I63.9 CEREBROVASCULAR ACCIDENT (CVA), UNSPECIFIED MECHANISM: ICD-10-CM

## 2018-09-21 DIAGNOSIS — I10 ESSENTIAL HYPERTENSION: Primary | ICD-10-CM

## 2018-09-21 PROCEDURE — 99214 OFFICE O/P EST MOD 30 MIN: CPT | Performed by: STUDENT IN AN ORGANIZED HEALTH CARE EDUCATION/TRAINING PROGRAM

## 2018-09-21 RX ORDER — FOLIC ACID 1 MG/1
1 TABLET ORAL DAILY
Qty: 30 TABLET | Refills: 3 | Status: SHIPPED | OUTPATIENT
Start: 2018-09-21 | End: 2019-01-24 | Stop reason: SDUPTHER

## 2018-09-21 RX ORDER — CLOPIDOGREL BISULFATE 75 MG/1
75 TABLET ORAL DAILY
Qty: 90 TABLET | Refills: 2 | Status: SHIPPED | OUTPATIENT
Start: 2018-09-21 | End: 2019-06-18 | Stop reason: SDUPTHER

## 2018-09-21 RX ORDER — LOSARTAN POTASSIUM AND HYDROCHLOROTHIAZIDE 12.5; 5 MG/1; MG/1
1 TABLET ORAL DAILY
Qty: 90 TABLET | Refills: 3 | Status: SHIPPED | OUTPATIENT
Start: 2018-09-21 | End: 2019-03-15 | Stop reason: SDUPTHER

## 2018-09-21 RX ORDER — ATORVASTATIN CALCIUM 80 MG/1
80 TABLET, FILM COATED ORAL DAILY
Qty: 90 TABLET | Refills: 2 | Status: SHIPPED | OUTPATIENT
Start: 2018-09-21 | End: 2019-06-18 | Stop reason: SDUPTHER

## 2018-09-21 RX ORDER — LOSARTAN POTASSIUM 100 MG/1
100 TABLET ORAL DAILY
Qty: 90 TABLET | Refills: 3 | Status: CANCELLED | OUTPATIENT
Start: 2018-09-21 | End: 2019-09-21

## 2018-09-21 RX ORDER — METOPROLOL SUCCINATE 50 MG/1
50 TABLET, EXTENDED RELEASE ORAL DAILY
Qty: 90 TABLET | Refills: 3 | Status: SHIPPED | OUTPATIENT
Start: 2018-09-21 | End: 2019-03-15 | Stop reason: SDUPTHER

## 2018-09-21 NOTE — PROGRESS NOTES
Subjective:       Patient ID: Noy Bourne is a 37 y.o. male.    Chief Complaint: Hypertension re-check   HPI   Noy Bourne is a 37 y.o. male with PMHx of HTN, DVT, with IVC filter and thoracic aortic aneurysm and CVA who presents to the office for hypertension check up. The patient was seen by his cardiologist in august and had a BP reading of 160/120 and was started on losartan. The patient endorses compliance with his medication. He does not have any home readings at this visit.  He denies any headaches, blurred vision or chest pain.     The patient also notes that he is out of all his medications including Plavix. He has not been on medications for the past week. The patient also needs to an evaluation for speech therapy.     Review of Systems   Constitutional: Negative for activity change, chills and fever.   HENT: Negative for congestion and sinus pressure.    Respiratory: Negative for cough, chest tightness and shortness of breath.    Cardiovascular: Negative for chest pain and leg swelling.   Gastrointestinal: Negative for abdominal pain, constipation, diarrhea, nausea, rectal pain and vomiting.   Genitourinary: Negative for difficulty urinating and dysuria.   Musculoskeletal: Negative for arthralgias, back pain, joint swelling and myalgias.   Skin: Negative for wound.   Neurological: Negative for dizziness, light-headedness, numbness and headaches.       Objective:      Vitals:    09/21/18 1045   BP: (!) 170/116   Pulse: (!) 59     Physical Exam   Constitutional: He is oriented to person, place, and time. He appears well-developed and well-nourished.   HENT:   Head: Normocephalic and atraumatic.   Eyes: Conjunctivae and EOM are normal.   Neck: Normal range of motion. Neck supple.   Cardiovascular: Normal rate, regular rhythm, normal heart sounds and intact distal pulses.   Pulmonary/Chest: Effort normal and breath sounds normal.   Abdominal: Soft. Bowel sounds are normal. He exhibits no  distension.   Musculoskeletal: Normal range of motion.   Neurological: He is alert and oriented to person, place, and time.   Skin: Skin is warm and dry.   Psychiatric: He has a normal mood and affect.       Assessment:       1. Essential hypertension    2. Cerebrovascular accident (CVA), unspecified mechanism    3. Thoracic aortic aneurysm without rupture        Plan:       Essential hypertension   -     metoprolol succinate (TOPROL-XL) 50 MG 24 hr tablet; Take 1 tablet (50 mg total) by mouth once daily.  Dispense: 90 tablet; Refill: 3  -     Cancel: losartan (COZAAR) 100 MG tablet; Take 1 tablet (100 mg total) by mouth once daily.  Dispense: 90 tablet; Refill: 3    Cerebrovascular accident (CVA), unspecified mechanism  -     clopidogrel (PLAVIX) 75 mg tablet; Take 1 tablet (75 mg total) by mouth once daily.  Dispense: 90 tablet; Refill: 2  -     Ambulatory Referral to Speech Therapy  -     metoprolol succinate (TOPROL-XL) 50 MG 24 hr tablet; Take 1 tablet (50 mg total) by mouth once daily.  Dispense: 90 tablet; Refill: 3  -     Cancel: losartan (COZAAR) 100 MG tablet; Take 1 tablet (100 mg total) by mouth once daily.  Dispense: 90 tablet; Refill: 3  -     folic acid (FOLVITE) 1 MG tablet; Take 1 tablet (1 mg total) by mouth once daily.  Dispense: 30 tablet; Refill: 3      Thoracic aortic aneurysm without rupture  -     atorvastatin (LIPITOR) 80 MG tablet; Take 1 tablet (80 mg total) by mouth once daily.  Dispense: 90 tablet; Refill: 2          Follow-up in about 1 week (around 9/28/2018).  Will review BP readings at next visit     D'Amico C Johnson, MD  9/21/2018  [facility] HO-1

## 2018-10-01 ENCOUNTER — CLINICAL SUPPORT (OUTPATIENT)
Dept: REHABILITATION | Facility: HOSPITAL | Age: 38
End: 2018-10-01
Payer: MEDICAID

## 2018-10-01 DIAGNOSIS — R47.01 APHASIA: ICD-10-CM

## 2018-10-01 PROCEDURE — 92523 SPEECH SOUND LANG COMPREHEN: CPT | Mod: PN

## 2018-10-15 NOTE — PLAN OF CARE
"Outpatient Neurological Rehabilitation  Speech and Language Therapy Evaluation    Date: 10/1/2018   Start Time:  1400  Stop Time:  1445    Name: Noy Bourne   MRN: [id number]    Therapy Diagnosis:   Encounter Diagnosis   Name Primary?    Aphasia     Physician: Johnson, D'Amico C., MD  Physician Orders: speech therapy evaluate and treat  Medical Diagnosis: CVA    Visit #:  1  Visits Authorized: 1  Date of Evaluation:  10/1/18  Insurance Authorization Period: September 21, 2018 to September 21, 2019  Plan of Care Certification:    10/1/18 to 12/1/18     Procedure Min.   Speech- Language Evaluation    45 minutes         Total Minutes: 45  Total Untimed Units: 0  Charges Billed/# of units: 1    Precautions:Standard  Subjective   Date of Onset: April 2018  History of Current Condition:   Pt reports some improvement noted in word finding and understanding but "it's still not right."  Past Medical History: Noy Bourne  has a past medical history of DVT (deep venous thrombosis), High cholesterol, Hypertension, and Stroke.  Noy Bourne  has a past surgical history that includes Diaphragm surgery; Aortic arch repair; Aortic valve surgery; and Abdominal surgery.  Medical Hx and Allergies: Noy has a current medication list which includes the following prescription(s): aspirin, atorvastatin, clopidogrel, escitalopram oxalate, fluticasone, folic acid, losartan-hydrochlorothiazide 50-12.5 mg, metoprolol succinate, and naproxen. Review of patient's allergies indicates:  No Known Allergies    Social History:  Pt lives with his wife in [place]. 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 Education level: 12th grade.   Prior Level of Function: Independent  Current Level of Function: Assisted  Pain:   0/10  Pain Location / Description: n/a  Nutrition:  Regular and thin liquid diet  Patient Therapy Goals:  "to improve my speech"  Objective     Western Aphasia Battery - Revised (WAB-R) " was administered to evaluate the patient's receptive and expressive language function.The purpose stated in the manual for the WAB-R is to determine the presence, severity, and type of aphasia; measure the patient's level of performance to provide a baseline for detecting change over time; provide a comprehensive assessment of the patients language assests and deficits in order to guide treatment and management; infer the location and etiology of the lesion causing the aphasia.  The following results were revealed:     Spontaneous Speech Score: 12 / 20  Auditory Comprehension Score: 7.75 / 10  Repetition Score:   4.8 /10  Naming and Word Finding Score: 8.1/ 10  Aphasia Quotient (AQ):   65.3 / 100  Aphasia Classification: Broca's Aphasia    Description: Noy presents with Broca's aphasia c/b halting, telegraphic speech, paraphasias, moderate-severe word-finding difficulty, agrammatic, nonfluent aphasia. Slow processing noted during all tasks. Frequent request for repetitions. When presented at a very slow rate, pt understood and completed tasks accurately.  Difficulty with more complex yes/no questions. Significant difficulty with repetition of 3 or more words.  Paraphasias noted during naming and word finding tasks. Perseverations noted during concrete category naming tasks. However, overall significant improvement since last evaluation as pt was unable to complete this standardized test previously.     Treatment   Treatment Time In: n/a  Treatment Time Out: n/a  Total Treatment Time: n/a  n/a    Education: POC, role of SLP, course of medical disease affect on therapy diagnosis and scheduling/ cancellation policy  was discussed with pt. Patient and family members expressed understanding.     Home Program: n/a  Assessment     Noy presents to [facility] s/p medical diagnosis of  CVA.  Demonstrates impairments including limitations as described in the problem list.He presents with  Broca's aphasia c/b halting, telegraphic speech, paraphasias, moderate-severe word-finding difficulty, agrammatic, nonfluent aphasia. Slow processing noted. Positive prognostic factors include age. Negative prognostic factors include attendance. Barriers to progress include attendance. Patient will benefit from skilled, outpatient neurological rehabilitation speech therapy.  Rehab Potential: fair  Pt's spiritual, cultural and educational needs considered and pt agreeable to plan of care and goals.    Short Term Goals (4 weeks):   1. Pt will participate in assessment of reading and writing skills.   2. Pt will participate in LAMINE by reading sentences in unison with therapist with 80% acc.   3. Pt will describe picture utilizing PACE technique with 80% acc given min A.  4. Pt will participate in responsive speech tasks with 80% acc given min A.  5. Pt will answer complex yes/no questions with 80% acc given min A.   6. Pt will follow simple 2 step directions with 80% acc given min A.   7. Pt will complete the sentence with 80% acc given min A.   8. Pt will name objects with 80% acc given min A.     Long Term Goals (8 weeks):   1. Pt will develop functional cognitive-linguistic based skills and utilize compensatory strategies to communicate wants and needs effectively to different conversational partners, maintain safety and participate socially in functional living environment.   2. Pt will comprehend communication related to basic medical and social needs and utilize compensatory strategies to maintain safety and participate socially in functional living environment.       Plan     Plan of Care Certification Period: 10/1/18 to 12/1/18    Recommended Treatment Plan:  Patient will participate in the [facility] program for speech therapy 2 times per week 8 weeks to address his  Communication deficits, to educate patient and their family, and to participate in a home exercise program.    Other  Recommendations: none    Therapist's Name:   Yvonne Hedrick M.S.CCC-SLP  Speech Language Pathologist     Date: 10/1/2018

## 2019-01-24 RX ORDER — FOLIC ACID 1 MG/1
1 TABLET ORAL DAILY
Qty: 30 TABLET | Refills: 3 | Status: SHIPPED | OUTPATIENT
Start: 2019-01-24 | End: 2019-06-18 | Stop reason: SDUPTHER

## 2019-03-15 ENCOUNTER — OFFICE VISIT (OUTPATIENT)
Dept: FAMILY MEDICINE | Facility: HOSPITAL | Age: 39
End: 2019-03-15
Attending: FAMILY MEDICINE
Payer: MEDICAID

## 2019-03-15 VITALS
HEART RATE: 89 BPM | HEIGHT: 68 IN | DIASTOLIC BLOOD PRESSURE: 80 MMHG | BODY MASS INDEX: 33.25 KG/M2 | SYSTOLIC BLOOD PRESSURE: 118 MMHG | WEIGHT: 219.38 LBS

## 2019-03-15 DIAGNOSIS — F32.A DEPRESSION, UNSPECIFIED DEPRESSION TYPE: ICD-10-CM

## 2019-03-15 DIAGNOSIS — F41.9 ANXIETY: ICD-10-CM

## 2019-03-15 DIAGNOSIS — Z86.718 HISTORY OF DVT OF LOWER EXTREMITY: ICD-10-CM

## 2019-03-15 DIAGNOSIS — J30.9 ALLERGIC RHINITIS, UNSPECIFIED SEASONALITY, UNSPECIFIED TRIGGER: ICD-10-CM

## 2019-03-15 DIAGNOSIS — I10 ESSENTIAL HYPERTENSION: ICD-10-CM

## 2019-03-15 DIAGNOSIS — I69.320 CVA, OLD, APHASIA: ICD-10-CM

## 2019-03-15 PROCEDURE — 96372 THER/PROPH/DIAG INJ SC/IM: CPT

## 2019-03-15 PROCEDURE — 99213 OFFICE O/P EST LOW 20 MIN: CPT | Mod: 25 | Performed by: STUDENT IN AN ORGANIZED HEALTH CARE EDUCATION/TRAINING PROGRAM

## 2019-03-15 RX ORDER — METOPROLOL TARTRATE 50 MG/1
50 TABLET ORAL DAILY
Qty: 30 TABLET | Refills: 11 | Status: SHIPPED | OUTPATIENT
Start: 2019-03-15 | End: 2019-10-31

## 2019-03-15 RX ORDER — FLUTICASONE PROPIONATE 50 MCG
1 SPRAY, SUSPENSION (ML) NASAL DAILY
Qty: 1 BOTTLE | Refills: 5 | Status: SHIPPED | OUTPATIENT
Start: 2019-03-15 | End: 2021-05-12

## 2019-03-15 RX ORDER — LOSARTAN POTASSIUM AND HYDROCHLOROTHIAZIDE 12.5; 5 MG/1; MG/1
1 TABLET ORAL DAILY
Qty: 90 TABLET | Refills: 3 | Status: SHIPPED | OUTPATIENT
Start: 2019-03-15 | End: 2020-06-04

## 2019-03-15 RX ORDER — ESCITALOPRAM OXALATE 10 MG/1
10 TABLET ORAL DAILY
Qty: 30 TABLET | Refills: 11 | Status: SHIPPED | OUTPATIENT
Start: 2019-03-15 | End: 2019-09-26

## 2019-03-15 RX ORDER — CETIRIZINE HYDROCHLORIDE 10 MG/1
10 TABLET ORAL DAILY
Qty: 30 TABLET | Refills: 11 | COMMUNITY
Start: 2019-03-15 | End: 2019-05-22

## 2019-03-15 RX ORDER — ATORVASTATIN CALCIUM 80 MG/1
80 TABLET, FILM COATED ORAL
COMMUNITY
End: 2020-03-25 | Stop reason: SDUPTHER

## 2019-03-15 RX ORDER — ASPIRIN 81 MG/1
81 TABLET ORAL
COMMUNITY
End: 2019-05-22 | Stop reason: SDUPTHER

## 2019-03-15 RX ORDER — METHYLPREDNISOLONE SOD SUCC 125 MG
125 VIAL (EA) INJECTION ONCE
Status: COMPLETED | OUTPATIENT
Start: 2019-03-15 | End: 2019-03-15

## 2019-03-15 RX ORDER — METOPROLOL TARTRATE 50 MG/1
50 TABLET ORAL
COMMUNITY
End: 2019-03-15 | Stop reason: SDUPTHER

## 2019-03-15 RX ORDER — CLOPIDOGREL BISULFATE 75 MG/1
75 TABLET ORAL
COMMUNITY
End: 2020-03-17 | Stop reason: SDUPTHER

## 2019-03-15 RX ORDER — METOPROLOL SUCCINATE 50 MG/1
50 TABLET, EXTENDED RELEASE ORAL DAILY
Qty: 90 TABLET | Refills: 3 | Status: SHIPPED | OUTPATIENT
Start: 2019-03-15 | End: 2020-03-01

## 2019-03-15 RX ADMIN — METHYLPREDNISOLONE SODIUM SUCCINATE 125 MG: 125 INJECTION, POWDER, FOR SOLUTION INTRAMUSCULAR; INTRAVENOUS at 10:03

## 2019-03-15 NOTE — PROGRESS NOTES
Subjective:       Patient ID: Noy Bourne is a 38 y.o. male.    Chief Complaint: Sinus congestion     Noy Bourne is a 38 y.o. Male with history of CVA with residual aphasia, DVT, HTN, anxiety and depression who presents for evaluation of runny nose and congestion x 1 week. The patient states he believes it to be allergies. He states similar episodes happen all the time around this time of year. He denies any sick contacts. He denies any body aches, fever, chills or nausea. He does have a nonproductive cough, for what he believes is a post nasal drip. He has not tried any OTC medications, but has used Flonase in the past.  He is not aware of allergic triggers.     The patient additionally needs some refills of medications, which will be filled.          Review of Systems   Constitutional: Negative for activity change, chills, fatigue and fever.   HENT: Positive for congestion, rhinorrhea and sinus pressure. Negative for ear pain.    Eyes: Negative for pain.   Respiratory: Positive for cough. Negative for apnea, chest tightness, shortness of breath and wheezing.    Cardiovascular: Negative for chest pain and palpitations.   Gastrointestinal: Negative for abdominal distention, abdominal pain, constipation, diarrhea, nausea and rectal pain.   Endocrine: Negative for polydipsia.   Genitourinary: Negative for flank pain.   Musculoskeletal: Negative for arthralgias, back pain, neck pain and neck stiffness.   Skin: Negative for rash.   Neurological: Negative for dizziness, tremors, facial asymmetry, speech difficulty and light-headedness.   Psychiatric/Behavioral: Negative for agitation.         Past Medical History:   Diagnosis Date    DVT (deep venous thrombosis)     High cholesterol     Hypertension     Stroke        Family History   Problem Relation Age of Onset    Hypertension Mother     Diabetes Mother     Cancer Father        Social History     Socioeconomic History    Marital status:       Spouse name: None    Number of children: None    Years of education: None    Highest education level: None   Social Needs    Financial resource strain: None    Food insecurity - worry: None    Food insecurity - inability: None    Transportation needs - medical: None    Transportation needs - non-medical: None   Occupational History    None   Tobacco Use    Smoking status: Current Some Day Smoker    Smokeless tobacco: Never Used    Tobacco comment: quit smoking 7.11.18   Substance and Sexual Activity    Alcohol use: Yes     Alcohol/week: 1.2 oz     Types: 2 Cans of beer per week     Frequency: Monthly or less     Drinks per session: 1 or 2     Binge frequency: Never    Drug use: No    Sexual activity: None   Other Topics Concern    None   Social History Narrative    ** Merged History Encounter **            Past Surgical History:   Procedure Laterality Date    ABDOMINAL SURGERY      AORTIC ARCH REPAIR      AORTIC VALVE SURGERY      DIAPHRAGM SURGERY           Current Outpatient Medications:     aspirin (ECOTRIN) 81 MG EC tablet, Take 1 tablet (81 mg total) by mouth once daily., Disp: 90 tablet, Rfl: 1    aspirin (ECOTRIN) 81 MG EC tablet, Take 81 mg by mouth., Disp: , Rfl:     atorvastatin (LIPITOR) 80 MG tablet, Take 1 tablet (80 mg total) by mouth once daily., Disp: 90 tablet, Rfl: 2    atorvastatin (LIPITOR) 80 MG tablet, Take 80 mg by mouth., Disp: , Rfl:     clopidogrel (PLAVIX) 75 mg tablet, Take 1 tablet (75 mg total) by mouth once daily., Disp: 90 tablet, Rfl: 2    clopidogrel (PLAVIX) 75 mg tablet, Take 75 mg by mouth., Disp: , Rfl:     fluticasone (FLONASE) 50 mcg/actuation nasal spray, 1 spray (50 mcg total) by Each Nare route once daily., Disp: 1 Bottle, Rfl: 5    folic acid (FOLVITE) 1 MG tablet, Take 1 tablet (1 mg total) by mouth once daily., Disp: 30 tablet, Rfl: 3    losartan-hydrochlorothiazide 50-12.5 mg (HYZAAR) 50-12.5 mg per tablet, Take 1 tablet by mouth  "once daily., Disp: 90 tablet, Rfl: 3    metoprolol succinate (TOPROL-XL) 50 MG 24 hr tablet, Take 1 tablet (50 mg total) by mouth once daily., Disp: 90 tablet, Rfl: 3    naproxen (NAPROSYN) 500 MG tablet, Take 1 tablet (500 mg total) by mouth daily as needed., Disp: 30 tablet, Rfl: 0    cetirizine (ZYRTEC) 10 MG tablet, Take 1 tablet (10 mg total) by mouth once daily., Disp: 30 tablet, Rfl: 11    escitalopram oxalate (LEXAPRO) 10 MG tablet, Take 1 tablet (10 mg total) by mouth once daily., Disp: 30 tablet, Rfl: 11    metoprolol tartrate (LOPRESSOR) 50 MG tablet, Take 1 tablet (50 mg total) by mouth once daily., Disp: 30 tablet, Rfl: 11    Current Facility-Administered Medications:     methylPREDNISolone sodium succinate injection 125 mg, 125 mg, Intramuscular, Once, D'Amico C. Johnson, MD    Checklist of Daily Activities:  bathing, dressing, eating, transferring, using toilet and walking independent for UE/LE dressing, toileting, brush teeth, and washface with no assistive devices.   Objective:      Body mass index is 33.85 kg/m².  Vitals:    03/15/19 0929   BP: 118/80   Pulse: 89   Weight: 99.5 kg (219 lb 5.7 oz)   Height: 5' 7.5" (1.715 m)   PainSc: 0-No pain     Physical Exam   Constitutional: He appears well-developed and well-nourished. He is cooperative.   HENT:   Head: Normocephalic and atraumatic.   Right Ear: External ear normal.   Left Ear: External ear normal.   Nose: Rhinorrhea present.   Mouth/Throat: Oropharynx is clear and moist.   Eyes: Conjunctivae and EOM are normal. Pupils are equal, round, and reactive to light.   Neck: Normal range of motion. Neck supple.   Cardiovascular: Normal rate, regular rhythm and intact distal pulses. Exam reveals no gallop and no friction rub.   No murmur heard.  Pulmonary/Chest: Breath sounds normal. He is in respiratory distress.   Abdominal: Soft. Bowel sounds are normal. He exhibits no distension. There is no tenderness. There is no guarding. "   Musculoskeletal: Normal range of motion. He exhibits no edema.   Neurological: He is alert. He has normal strength. No cranial nerve deficit or sensory deficit.   Slowed response    Skin: Skin is warm and dry. Capillary refill takes less than 2 seconds.   Psychiatric: He has a normal mood and affect.   Nursing note and vitals reviewed.      Assessment:       1. Allergic rhinitis, unspecified seasonality, unspecified trigger    2. CVA, old, aphasia    3. Anxiety    4. Depression, unspecified depression type    5. History of DVT of lower extremity    6. Essential hypertension        Plan:       Allergic rhinitis, unspecified seasonality, unspecified trigger  -     cetirizine (ZYRTEC) 10 MG tablet; Take 1 tablet (10 mg total) by mouth once daily.  Dispense: 30 tablet; Refill: 11  -     fluticasone (FLONASE) 50 mcg/actuation nasal spray; 1 spray (50 mcg total) by Each Nare route once daily.  Dispense: 1 Bottle; Refill: 5  -     methylPREDNISolone sodium succinate injection 125 mg    CVA, old, aphasia  -     CBC auto differential; Future; Expected date: 03/15/2019  -     Comprehensive metabolic panel; Future; Expected date: 03/15/2019  -     Hemoglobin A1c; Future; Expected date: 03/15/2019  -     Lipid panel; Future; Expected date: 03/15/2019        -     Amb ref Speech therapy     Anxiety  -     escitalopram oxalate (LEXAPRO) 10 MG tablet; Take 1 tablet (10 mg total) by mouth once daily.  Dispense: 30 tablet; Refill: 11    Depression, unspecified depression type  -     escitalopram oxalate (LEXAPRO) 10 MG tablet; Take 1 tablet (10 mg total) by mouth once daily.  Dispense: 30 tablet; Refill: 11    History of DVT of lower extremity        -     Continue Plavix, ASA and statin         -     Lipid Panel     Essential hypertension  -     CBC auto differential; Future; Expected date: 03/15/2019  -     losartan-hydrochlorothiazide 50-12.5 mg (HYZAAR) 50-12.5 mg per tablet; Take 1 tablet by mouth once daily.  Dispense: 90  tablet; Refill: 3  -     metoprolol tartrate (LOPRESSOR) 50 MG tablet; Take 1 tablet (50 mg total) by mouth once daily.  Dispense: 30 tablet; Refill: 11  -     metoprolol succinate (TOPROL-XL) 50 MG 24 hr tablet; Take 1 tablet (50 mg total) by mouth once daily.  Dispense: 90 tablet; Refill: 3      Follow-up in about 3 months (around 6/15/2019).        Goal BP<140/90  Goal A1C <7.0  Goal BMI <30    General weight loss/lifestyle modification strategies discussed (elicit support from others; identify saboteurs; non-food rewards, etc).    A total of 25 minutes were spent face-to-face with the patient during this encounter and over half of that time was spent on counseling and coordination of care. We discussed in depth the importance of adherence diabetic low salt diet and exercise. I also educated the patient about lifestyle modifications which may improve blood pressure.       I offered to discuss end of life issues, including information on how to make advance directives that the patient could use to name someone who would make medical decisions on their behalf if they became too ill to make themselves.     __x_Patient declined  ___Patient is interested, I provided paper work and offered to discuss.    D'Amico C Johnson, MD  3/15/2019  [facility] HO-1

## 2019-03-15 NOTE — PATIENT INSTRUCTIONS

## 2019-04-01 ENCOUNTER — CLINICAL SUPPORT (OUTPATIENT)
Dept: REHABILITATION | Facility: HOSPITAL | Age: 39
End: 2019-04-01
Payer: MEDICAID

## 2019-04-01 DIAGNOSIS — R47.01 BROCA'S APHASIA: ICD-10-CM

## 2019-04-01 PROCEDURE — 92523 SPEECH SOUND LANG COMPREHEN: CPT | Mod: PN

## 2019-04-01 NOTE — PLAN OF CARE
Outpatient Neurological Rehabilitation  Speech and Language Therapy Evaluation    Date: 4/1/2019     Name: Noy Bourne   MRN: [id number]    Therapy Diagnosis:   Encounter Diagnosis   Name Primary?    Broca's aphasia     Physician: Johnson, D'Amico C., MD  Physician Orders: speech therapy evaluate and treat  Medical Diagnosis: I69.391 (ICD-10-CM) - Dysphagia as late effect of cerebrovascular accident (CVA)     Visit # / Visits Authorized:  1 / 50   Date of Evaluation:  4/1/2019   Insurance Authorization Period: 03/19/2019 to12/31/2019   Plan of Care Certification:    4/1/2019 to 5/31/19     Time In:1315   Time Out: 1400   Total Billable Time: 45  Procedure Min.   Speech Language Evaluation   45 minutes         Precautions:Standard  Subjective   Date of Onset: April 2018.  History of Current Condition:   Pt reports improvement in his word finding. Noy Bourne presents to the [facility] secondary to the diagnosis of CVA. 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 He was reportedly discharged on 4/30/18.   Past Medical History: Noy Bourne  has a past medical history of DVT (deep venous thrombosis), High cholesterol, Hypertension, and Stroke.  Noy Bourne  has a past surgical history that includes Diaphragm surgery; Aortic arch repair; Aortic valve surgery; and Abdominal surgery.  Medical Hx and Allergies: Noy has a current medication list which includes the following prescription(s): aspirin, aspirin, atorvastatin, atorvastatin, cetirizine, clopidogrel, clopidogrel, escitalopram oxalate, fluticasone, folic acid, losartan-hydrochlorothiazide 50-12.5 mg, metoprolol succinate, metoprolol tartrate, and naproxen. Review of patient's allergies indicates:  No Known Allergies  Prior  Therapy:  Outpatient speech therapy May to August 2018.   Social History:  Pt lives with his wife in [place]. 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 Education level: 12th grade.   Prior Level of Function: Independent  Current Level of Function: Aphasia; difficulty communicating wants/needs  Pain:   0/10  Pain Location / Description: n/a  Nutrition:  Regular and thin liquids. No weight loss.   Patient's Therapy Goals:  To get my speech better.   Objective   Formal Assessment:  Western Aphasia Battery - Revised (WAB-R) was administered to evaluate the patient's receptive and expressive language function.The purpose stated in the manual for the WAB-R is to determine the presence, severity, and type of aphasia; measure the patient's level of performance to provide a baseline for detecting change over time; provide a comprehensive assessment of the patients language assests and deficits in order to guide treatment and management; infer the location and etiology of the lesion causing the aphasia.  The following results were revealed:     Spontaneous Speech Score: 13 / 20  Auditory Comprehension Score: 8.75 / 10  Repetition Score:   5.8 /10  Naming and Word Finding Score: 8.5/ 10  Aphasia Quotient (AQ):   72.1 / 100  Aphasia Classification: Broca's aphasia    Ramohn Migel Lovepresents with halting, telegraphic speech, paraphasias, moderate word-finding difficulty, agrammatic, nonfluent aphasia.  Answering simple yes/no questions WFL. Answering complex yes/no questions impaired. Pt presents with delayed auditory processing. If clinician decreased rate of speech, pt is able to answer questions and follow directions with increased accuracy. Able to follow simple directions. Difficulty with multi-step sequential directions. Able to repeat words and short phrases. Difficulty with sentences. Named 11 concrete category members in 60 seconds (average is 15-20). Simple sentence completion WNL.  "Responsive speech with 60% acc Independently.     Description:  Reading Comprehension:  Not formally tested.     Written Expression: Pt able to write his name legibly.  Difficulty writing a simple sentence. Unable to write a word presented verbally.  Plan to complete further testing.     Cognition: Pleasant. Awake, alert.  Oriented to self, city, state, . Difficulty with date, address, and facility name.    Motor Speech/Fluency/Voice:  No significant dysarthria noted. No significant dysphonia. Adequate volume.     Swallowing: Denies difficulty swallowing. No coughing, throat clearing, changes in vocal quality. Denies globus sensation.     Hearing / Vision: Pt reports "I can't hear."  "I can see good." "Sometimes I see something in the corner of my eye, but then I look and it's not there."     Treatment   Treatment Time In: n/a  Treatment Time Out: n/a  Total Treatment Time: n/a  no treatment performed 2/2 time to complete evaluation.    Education: Plan of Care, role of SLP in care, course of medical disease affect on therapy diagnosis , scheduling/ cancellation policy and insurance limitations / visit limit  was discussed with pt. Patient expressed understanding.     Home Program: n/a  Assessment     Noy presents to [facility] s/p medical diagnosis of  CVA.  Demonstrates impairments including limitations as described in the problem list.He presents with Broca's aphasia c/b difficulty word finding, describing pictures fluently and correct grammar, delayed processing, comprehension. Broca's aphasia c/b halting, telegraphic speech, paraphasias, moderate-severe word-finding difficulty, agrammatic, nonfluent aphasia. Slow processing noted. Positive prognostic factors include age. Negative prognostic factors include attendance. Barriers to progress include attendance. Patient will benefit from skilled, outpatient neurological rehabilitation speech therapy.    Rehab Potential: " fair-good  Pt's spiritual, cultural and educational needs considered and pt agreeable to plan of care and goals.    Short Term Goals (4 weeks):   1. Pt will participate in assessment of reading and writing skills utilizing the WAB -Revised Part 2.   2. Pt will participate in LAMINE by reading sentences in unison with therapist with 80% acc to improve reading comprehension and expressive language.   3. Pt will describe picture utilizing CAAST with 80% acc given min A to improve functional communication at home.  4. Pt will participate in Semantic Feature Analysis with 70% acc given min-mod A to improve language skills.   5. Pt will answer auditory processing questions with 70% acc given min-mod A to improve auditory comprehension.   6. Pt will name concrete category members x 15 with 90% acc given min A to improve word finding skills.   7. Pt will form grammatically correct sentences to describe a picture with adequate fluency with 80% acc given min A to improve syntactic skills and speech fluency.    Long Term Goals (8 weeks):   1. Pt will develop functional cognitive-linguistic based skills and utilize compensatory strategies to communicate wants and needs effectively to different conversational partners, maintain safety and participate socially in functional living environment.      Spoken Language Expression  LEVEL 4: The individual is successfully able to initiate communication using spoken language in simple, structured conversations in routine daily activities with familiar communication partners. The individual usually requires moderate cueing, but is able to demonstrate use of simple sentences (i.e., semantics, syntax, and morphology) and rarely uses complex sentences/messages.    Plan     Plan of Care Certification Period: 4/1/2019  to 5/31/19    Recommended Treatment Plan:  Patient will participate in the [facility] program for speech therapy 2 times per week for 8 weeks to address  his  Communication deficits, to educate patient and their family, and to participate in a home exercise program.    Other Recommendations: none at this time    Therapist's Name:   Yvonne Hedrick CCC-SLP   4/1/2019

## 2019-04-01 NOTE — Clinical Note
To Dr. D'AmicoFrom Rachael Rayes, M.S.CCC-SLPResults of [facility]. Please sign and fax back to [phone] if you agree with plan of care.

## 2019-04-03 ENCOUNTER — CLINICAL SUPPORT (OUTPATIENT)
Dept: REHABILITATION | Facility: HOSPITAL | Age: 39
End: 2019-04-03
Payer: MEDICAID

## 2019-04-03 DIAGNOSIS — R47.01 BROCA'S APHASIA: ICD-10-CM

## 2019-04-03 PROCEDURE — 92507 TX SP LANG VOICE COMM INDIV: CPT | Mod: PN

## 2019-04-04 NOTE — PROGRESS NOTES
"Outpatient Neurological Rehabilitation   Speech and Language Therapy Daily Note  Date:  4/3/2019     Name: Noy Bourne   MRN: [id number]   Therapy Diagnosis:   Encounter Diagnosis   Name Primary?    Broca's aphasia    Physician: Johnson, D'Amico C., MD  Physician Orders: speech therapy evaluate and treat  Medical Diagnosis: I69.391 (ICD-10-CM) - Dysphagia as late effect of cerebrovascular accident (CVA)     Visit #/ Visits Authorized: 2/50   Date of Evaluation: 4/1/2019  Insurance Authorization Period: 03/19/2019 to12/31/2019   Plan of Care Certification:    4/1/2019 to 5/31/19  Visits Cancelled: 0  Visits No Show: 0    Time In:  1445  Time Out:  1530  Total Billable Time: 45     Precautions: Standard    Subjective:   Pt reports: Pleasant; no complaints. Pt reports "I like coming here."   He was compliant to home exercise program. Returned and reviewed.  Response to previous treatment: I'm doing better. That (homework) was easy.    Pain Scale:  0/10 on VAS currently.   Pain Location: n/a  Objective:   TIMED  Procedure Min.             UNTIMED  Procedure Min.   Speech- Language- Voice Therapy  45 minutes       Total Timed Units: 0  Total Untimed Units: 1  Charges Billed/# of units: 1    Short Term Goals: (4 weeks) Current Progress:         1. Pt will participate in assessment of reading and writing skills utilizing the WAB -Revised Part 2.     Progressing/ Not Met 4/3/2019   In progress. See below.     2. Pt will participate in LAMINE by reading sentences in unison with therapist with 80% acc to improve reading comprehension and expressive language.     Progressing/ Not Met 4/3/2019   -Not addressed this session.    3. Pt will describe picture utilizing CAAST with 80% acc given min A to improve functional communication at home.    Progressing/ Not Met 4/3/2019   -Not addressed this session.    4. Pt will participate in Semantic Feature Analysis with 70% acc given min-mod A to improve language skills. "     Progressing/ Not Met 4/3/2019   -Not addressed this session.    5. Pt will answer auditory processing questions with 70% acc given min-mod A to improve auditory comprehension.     Progressing/ Not Met 4/3/2019   -Not addressed this session.    6. Pt will name concrete category members x 15 with 90% acc given min A to improve word finding skills.     Progressing/ Not Met 4/3/2019   -Not addressed this session.    7. Pt will form grammatically correct sentences to describe a picture with adequate fluency with 80% acc given min A to improve syntactic skills and speech fluency.    Progressing/ Not Met 4/3/2019   -Not addressed this session.      Western Aphasia Battery - Revised (WAB-R) Part 2    Reading    A. Comprehension of Sentences  40/40   B. Reading Commands 19/20   C. Written Word - Object Choice Matching 6/6   D. Written Word - Picture Choice Matching 6/6   E. Picture-Written Word Choice Matching  6/6   F. Spoken Word-Written Word Choice Matching 3/4   G. Letter Discrimination 4/6   H. Spelled Word Recognition 0/6   I. Spelling 3/6   Total 87/100     Patient Education/Response:   Educated on goals and plan of care. Pt reported understanding.     Written Home Exercises Provided: yes. Unit 5: Functional Language, [facility] 1. Pg [id number]. Copyright 2002, [employer].  Exercises were reviewed and Noy was able to demonstrate them prior to the end of the session.  Noy demonstrated good  understanding of the education provided.     Assessment:   Noy is progressing well towards his goals. Administered Part Two - Reading subtests of the WAB-revised.  Pt performs better when instructions and all interactions are presented with a decreased rate of speech. Current goals remain appropriate. Goals to be updated as necessary.   Pt prognosis is Good. Pt will continue to benefit from skilled outpatient speech and language therapy to address the deficits listed in the problem list on initial evaluation, provide  "pt/family education and to maximize pt's level of independence in the home and community environment.     Medical necessity is demonstrated by the following IMPAIRMENTS:  Pt's Broca's aphasia makes it difficult to communicate a message in an emergency.   Barriers to Therapy: none  Pt's spiritual, cultural and educational needs considered and pt agreeable to plan of care and goals.    Plan:   Continue POC with focus on speech and language.   Updated POC due 5/31/19    [employer].    Clinician    "I, Yvonne Hedrick, certify that I was present in the room directing the student and service delivery and guiding them using my skilled judgement. As the co-signing therapist, I have reviewed the student's documentation, and am responsible for the treatment, assessment and plan."     Yvonne Hedrick, SONIDO-SLP   4/3/2019           "

## 2019-04-04 NOTE — PROGRESS NOTES
1. Pt will participate in assessment of reading and writing skills utilizing the WAB -Revised Part 2.   2. Pt will participate in LAMINE by reading sentences in unison with therapist with 80% acc to improve reading comprehension and expressive language.   3. Pt will describe picture utilizing CAAST with 80% acc given min A to improve functional communication at home.  4. Pt will participate in Semantic Feature Analysis with 70% acc given min-mod A to improve language skills.   5. Pt will answer auditory processing questions with 70% acc given min-mod A to improve auditory comprehension.   6. Pt will name concrete category members x 15 with 90% acc given min A to improve word finding skills.   7. Pt will form grammatically correct sentences to describe a picture with adequate fluency with 80% acc given min A to improve syntactic skills and speech fluency.

## 2019-04-08 DIAGNOSIS — H91.90 HEARING DIFFICULTY, UNSPECIFIED LATERALITY: Primary | ICD-10-CM

## 2019-04-10 ENCOUNTER — CLINICAL SUPPORT (OUTPATIENT)
Dept: REHABILITATION | Facility: HOSPITAL | Age: 39
End: 2019-04-10
Payer: MEDICAID

## 2019-04-10 DIAGNOSIS — R47.01 BROCA'S APHASIA: ICD-10-CM

## 2019-04-10 PROCEDURE — 92507 TX SP LANG VOICE COMM INDIV: CPT | Mod: PN

## 2019-04-10 NOTE — PROGRESS NOTES
"Outpatient Neurological Rehabilitation   Speech and Language Therapy Daily Note  Date:  4/10/2019     Name: Noy Bourne   MRN: [id number]   Therapy Diagnosis:   Encounter Diagnosis   Name Primary?    Broca's aphasia    Physician: Johnson, D'Amico C., MD  Physician Orders: speech therapy evaluate and treat  Medical Diagnosis: I69.391 (ICD-10-CM) - Dysphagia as late effect of cerebrovascular accident (CVA)     Visit #/ Visits Authorized: 3/50   Date of Evaluation: 4/1/2019  Insurance Authorization Period: 03/19/2019 to12/31/2019   Plan of Care Certification:    4/1/2019 to 5/31/19  Visits Cancelled: 0  Visits No Show: 0    Time In:  1100  Time Out:  1145  Total Billable Time: 45     Precautions: Standard    Subjective:   Pt reports: Pleasant; no complaints. Pt reports "I am doing alright."  He was compliant to home exercise program. Returned and reviewed.  Response to previous treatment: I'm doing better. That (homework) was easy.    Pain Scale:  0/10 on VAS currently.   Pain Location: n/a  Objective:   TIMED  Procedure Min.             UNTIMED  Procedure Min.   Speech- Language- Voice Therapy  45 minutes       Total Timed Units: 0  Total Untimed Units: 1  Charges Billed/# of units: 1    Short Term Goals: (4 weeks) Current Progress:         1. Pt will participate in assessment of reading and writing skills utilizing the WAB -Revised Part 2.     Progressing/ Not Met 4/10/2019   In progress. See below.     2. Pt will participate in LAMINE by reading sentences in unison with therapist with 80% acc to improve reading comprehension and expressive language.     Progressing/ Not Met 4/10/2019   -Not addressed this session.    3. Pt will describe picture utilizing CAAST with 80% acc given min A to improve functional communication at home.    Progressing/ Not Met 4/10/2019   -Not addressed this session.    4. Pt will participate in Semantic Feature Analysis with 70% acc given min-mod A to improve language skills. "     Progressing/ Not Met 4/10/2019   -SFA for the following words:  -ball x 7 with 86% acc independently, min 100% verbal/visual A  -hammer x 8 with 88% acc independently, min 100% verbal/visual A  -salt shaker x 6 with 83% acc independently; min 100% verbal/visual A  -medicine bottle x 7 with 43% acc independently; mod 100% verbal/visual A     5. Pt will answer auditory processing questions with 70% acc given min-mod A to improve auditory comprehension.     Progressing/ Not Met 4/10/2019   -Answered simple yes/no questions x 20 with 70% acc independently; mod 100% verbal A   6. Pt will name concrete category members x 15 with 90% acc given min A to improve word finding skills.     Progressing/ Not Met 4/10/2019   -Not addressed this session.    7. Pt will form grammatically correct sentences to describe a picture with adequate fluency with 80% acc given min A to improve syntactic skills and speech fluency.    Progressing/ Not Met 4/10/2019   -Not addressed this session.         Western Aphasia Battery - Revised (WAB-R) Part 2     Reading     A. Comprehension of Sentences  40/40   B. Reading Commands 19/20   C. Written Word - Object Choice Matching 6/6   D. Written Word - Picture Choice Matching 6/6   E. Picture-Written Word Choice Matching  6/6   F. Spoken Word-Written Word Choice Matching 3/4   G. Letter Discrimination 4/6   H. Spelled Word Recognition 0/6   I. Spelling 3/6   Total 87/100       Western Aphasia Battery - Revised (WAB-R) Part 2    Writing    A. Writing Upon Request 6/6   B. Writing Output 16/34   C. Writing to Dictation 0/10   D. Writing Dictated Words 7/10   E. Alphabet and Numbers 22/22.5   F. Dictated Letters and Numbers 5.5/7   G. Copying a Sentence 10/10   Total 66.5/100        Western Aphasia Battery - Revised (WAB-R) Language Quotient (LQ)    Spontaneous Speech Score 13/20   Auditory Verbal Comprehension Score 17.5/20   Repetition Score 5.8/10   Naming and Word Finding Score 8.5/10   Reading  Score 17.4/20   Writing Score 13.3/20   Language Quotient (LQ) 75.5/100     Results of the Western Aphasia Battery- Revised Reading and Writing subtests revealed difficulty with spelled word recognition, spelling, writing output, writing to dictation (sentences), writing dictated words, writing dictated 3-4 digit numbers.     New goal:   8. Pt will write single words with 80% acc given min A to improve auditory processing and writing to dictation skills.     Patient Education/Response:   Educated on goals and plan of care. Pt reported understanding.     Written Home Exercises Provided: yes.   Exercises were reviewed and Noy was able to demonstrate them prior to the end of the session.  Noy demonstrated good  understanding of the education provided.     Assessment:   Noy is progressing well towards his goals. Administered Part Two - Writing subtests of the WAB-revised.  Pt performs better when instructions and all interactions are presented with a decreased rate of speech. Patient showed an increase word finding skills during SFA when stating features of various objects. Patient exhibited difficulty in auditory comprehension when asked complex yes/no questions. Current goals remain appropriate. Goals to be updated as necessary.    Pt prognosis is Good. Pt will continue to benefit from skilled outpatient speech and language therapy to address the deficits listed in the problem list on initial evaluation, provide pt/family education and to maximize pt's level of independence in the home and community environment.     Medical necessity is demonstrated by the following IMPAIRMENTS:  Pt's Broca's aphasia makes it difficult to communicate a message in an emergency.   Barriers to Therapy: none  Pt's spiritual, cultural and educational needs considered and pt agreeable to plan of care and goals.    Plan:   Continue POC with focus on speech and language.   Updated POC due 5/31/19    [employer].   Graduate  "Student Clinician    "I, Yvonne Hedrick, certify that I was present in the room directing the student and service delivery and guiding them using my skilled judgement. As the co-signing therapist, I have reviewed the student's documentation, and am responsible for the treatment, assessment and plan."     Yvonne Hedrick CCC-SLP   4/10/2019         "

## 2019-04-11 ENCOUNTER — TELEPHONE (OUTPATIENT)
Dept: OTOLARYNGOLOGY | Facility: CLINIC | Age: 39
End: 2019-04-11

## 2019-04-11 ENCOUNTER — CLINICAL SUPPORT (OUTPATIENT)
Dept: REHABILITATION | Facility: HOSPITAL | Age: 39
End: 2019-04-11
Payer: MEDICAID

## 2019-04-11 DIAGNOSIS — R47.01 BROCA'S APHASIA: ICD-10-CM

## 2019-04-11 PROCEDURE — 92507 TX SP LANG VOICE COMM INDIV: CPT | Mod: PN

## 2019-04-11 NOTE — TELEPHONE ENCOUNTER
----- Message from Umberto Mcclellan sent at 4/11/2019  1:28 PM CDT -----  Contact: Patient   Patient has an appointment on 06/20/2019 for hearing loss. Patient needs to be schedule to for a audiogram. I attempted to make the appointment for the audiogram but the system would not allow me to make that appointment.     Callback: [phone]    Thank you

## 2019-04-11 NOTE — PROGRESS NOTES
"Outpatient Neurological Rehabilitation   Speech and Language Therapy Daily Note  Date:  4/11/2019     Name: Noy Bourne   MRN: [id number]   Therapy Diagnosis:   Encounter Diagnosis   Name Primary?    Broca's aphasia    Physician: Johnson, D'Amico C., MD  Physician Orders: speech therapy evaluate and treat  Medical Diagnosis: I69.391 (ICD-10-CM) - Dysphagia as late effect of cerebrovascular accident (CVA)     Visit #/ Visits Authorized: 4/50   Date of Evaluation: 4/1/2019  Insurance Authorization Period: 03/19/2019 to12/31/2019   Plan of Care Certification:    4/1/2019 to 5/31/19  Visits Cancelled: 0  Visits No Show: 0    Time In:  0845  Time Out:  0930  Total Billable Time: 45     Precautions: Standard    Subjective:   Pt reports: Pleasant; no complaints. Pt reports "I am doing alright."  He was compliant to home exercise program. Returned and reviewed.  Response to previous treatment: I'm doing better. That (homework) was hard.    Pain Scale:  0/10 on VAS currently.   Pain Location: n/a  Objective:   TIMED  Procedure Min.             UNTIMED  Procedure Min.   Speech- Language- Voice Therapy  45 minutes       Total Timed Units: 0  Total Untimed Units: 1  Charges Billed/# of units: 1    Short Term Goals: (4 weeks) Current Progress:           2. Pt will participate in LAMINE by reading sentences in unison with therapist with 80% acc to improve reading comprehension and expressive language.     Progressing/ Not Met 4/11/2019   Read x39 with 100% acc  Point x10 with 100% acc  Say x10 with 100% acc   3. Pt will describe picture utilizing CAAST with 80% acc given min A to improve functional communication at home.    Progressing/ Not Met 4/11/2019   -Not addressed this session.    4. Pt will participate in Semantic Feature Analysis with 70% acc given min-mod A to improve language skills.     Progressing/ Not Met 4/11/2019   -SFA for the following words:  -Scissors x 5 with 80% acc independently  -Lighter x 6 with 100% " acc independently  -Candle x 6 with 83% acc independently  -Paint Brush x 6 with 100% acc independently  -Lock x6 with 100% acc independently  -Measuring cup x6 with 83% acc independently 100% with min A     5. Pt will answer auditory processing questions with 70% acc given min-mod A to improve auditory comprehension.     Progressing/ Not Met 4/11/2019   -Answered simple yes/no questions x 20 with 90% acc independently  -multiple repetitions required  -visual provided frequently  -mixture of auditory processing and reading comprehension     6. Pt will name concrete category members x 15 with 90% acc given min A to improve word finding skills.     Progressing/ Not Met 4/11/2019   -Not addressed this session.    7. Pt will form grammatically correct sentences to describe a picture with adequate fluency with 80% acc given min A to improve syntactic skills and speech fluency.    Progressing/ Not Met 4/11/2019   -Not addressed this session.    8. Pt will write single words with 80% acc given min A to improve auditory processing and writing to dictation skills.       GOALS MET  1. Pt will participate in assessment of reading and writing skills utilizing the WAB -Revised Part 2.     Patient Education/Response:   Educated on goals and plan of care. Pt reported understanding.     Written Home Exercises Provided: yes.   Exercises were reviewed and Noy was able to demonstrate them prior to the end of the session.  Noy demonstrated good  understanding of the education provided.     Assessment:   Noy is progressing well towards his goals.  Pt performs better when instructions and all interactions are presented with a decreased rate of speech. Patient showed an increase word finding skills during SFA when stating features of various objects. Patient exhibited difficulty in auditory comprehension when asked complex yes/no questions. Introduced LAMINE. Current goals remain appropriate. Goals to be updated as necessary.    Pt  "prognosis is Good. Pt will continue to benefit from skilled outpatient speech and language therapy to address the deficits listed in the problem list on initial evaluation, provide pt/family education and to maximize pt's level of independence in the home and community environment.     Medical necessity is demonstrated by the following IMPAIRMENTS:  Pt's Broca's aphasia makes it difficult to communicate a message in an emergency.   Barriers to Therapy: none  Pt's spiritual, cultural and educational needs considered and pt agreeable to plan of care and goals.    Plan:   Continue POC with focus on speech and language.   Updated POC due 5/31/19    Jayne TAM, SLP-A   Clinician    "I, Yvonne Hedrick, certify that I was present in the room directing the student and service delivery and guiding them using my skilled judgement. As the co-signing therapist, I have reviewed the student's documentation, and am responsible for the treatment, assessment and plan."     Yvonne Hedrick, SONIDO-SLP   4/11/2019       "

## 2019-04-15 ENCOUNTER — CLINICAL SUPPORT (OUTPATIENT)
Dept: REHABILITATION | Facility: HOSPITAL | Age: 39
End: 2019-04-15
Payer: MEDICAID

## 2019-04-15 DIAGNOSIS — R47.01 BROCA'S APHASIA: ICD-10-CM

## 2019-04-15 PROCEDURE — 92507 TX SP LANG VOICE COMM INDIV: CPT | Mod: PN

## 2019-04-15 NOTE — PROGRESS NOTES
"Outpatient Neurological Rehabilitation   Speech and Language Therapy Daily Note  Date:  4/15/2019     Name: Noy Bourne   MRN: [id number]   Therapy Diagnosis:   Encounter Diagnosis   Name Primary?    Broca's aphasia    Physician: Johnson, D'Amico C., MD  Physician Orders: speech therapy evaluate and treat  Medical Diagnosis: I69.391 (ICD-10-CM) - Dysphagia as late effect of cerebrovascular accident (CVA)     Visit #/ Visits Authorized: 5/50   Date of Evaluation: 4/1/2019  Insurance Authorization Period: 03/19/2019 to12/31/2019   Plan of Care Certification:    4/1/2019 to 5/31/19  Visits Cancelled: 0  Visits No Show: 0    Time In:  1451  Time Out:  1530  Total Billable Time: 39    Precautions: Standard    Subjective:   Pt reports: Pleasant; no complaints. Pt reports "I am okay."  He was compliant to home exercise program. Returned and reviewed.  Response to previous treatment: I'm doing better. That (homework) was easy. 15/16 on "following directions" homework worksheet.   Pain Scale:  0/10 on VAS currently.   Pain Location: n/a  Objective:   TIMED  Procedure Min.             UNTIMED  Procedure Min.   Speech- Language- Voice Therapy  39 minutes       Total Timed Units: 0  Total Untimed Units: 1  Charges Billed/# of units: 1    Short Term Goals: (4 weeks) Current Progress:           2. Pt will participate in LAMINE by reading sentences in unison with therapist with 80% acc to improve reading comprehension and expressive language.     Progressing/ Not Met 4/15/2019   Read x46 with 100% acc  Point x10 with 100% acc  Say x10 with 100% acc   3. Pt will describe picture utilizing CAAST with 80% acc given min A to improve functional communication at home.    Progressing/ Not Met 4/15/2019   -Not addressed this session.    4. Pt will participate in Semantic Feature Analysis with 70% acc given min-mod A to improve language skills.     Progressing/ Not Met 4/15/2019   -Not addressed this session.      5. Pt will answer " "auditory processing questions with 70% acc given min-mod A to improve auditory comprehension.     Progressing/ Not Met 4/15/2019   -Not addressed this session.      6. Pt will name concrete category members x 15 with 90% acc given min A to improve word finding skills.     Progressing/ Not Met 4/15/2019   -Things in the kitchen x 9 in 60 seconds independently; x 9 min A untimed; perseveration x 1  -Zoo animals x 11 in 60 seconds independently; x 1 untimed   7. Pt will form grammatically correct sentences to describe a picture with adequate fluency with 80% acc given min A to improve syntactic skills and speech fluency.    Progressing/ Not Met 4/15/2019   -Not addressed this session.    8. Pt will write single words with 80% acc given min A to improve auditory processing and writing to dictation skills.  -4-letter words x 10 with 40% acc independently; mod 100% verbal, phonemic, and visual A  -utilized alphabet chart      GOALS MET  1. Pt will participate in assessment of reading and writing skills utilizing the WAB -Revised Part 2.     Patient Education/Response:   Educated on goals and plan of care. Pt reported understanding.     Written Home Exercises Provided: yes. [employer] 1999, "Complete Common Sentences."  Exercises were reviewed and Noy was able to demonstrate them prior to the end of the session.  Noy demonstrated good  understanding of the education provided.     Assessment:   Noy is progressing well towards his goals.  Pt performs better when instructions and all interactions are presented with a decreased rate of speech. Patient exhibited difficulty spelling four letter words; improvement noted with mod cues.  Increase in naming concrete category members to improve word finding skills. Current goals remain appropriate. Goals to be updated as necessary.    Pt prognosis is Good. Pt will continue to benefit from skilled outpatient speech and language therapy to address the " "deficits listed in the problem list on initial evaluation, provide pt/family education and to maximize pt's level of independence in the home and community environment.     Medical necessity is demonstrated by the following IMPAIRMENTS:  Pt's Broca's aphasia makes it difficult to communicate a message in an emergency.   Barriers to Therapy: none  Pt's spiritual, cultural and educational needs considered and pt agreeable to plan of care and goals.    Plan:   Continue POC with focus on speech and language.   Updated POC due 5/31/19    [employer].    Clinician    "I, Yvonne Hedrick, certify that I was present in the room directing the student and service delivery and guiding them using my skilled judgement. As the co-signing therapist, I have reviewed the student's documentation, and am responsible for the treatment, assessment and plan."     Yvonne Hedrick, CCC-SLP   4/15/2019     "

## 2019-04-17 ENCOUNTER — CLINICAL SUPPORT (OUTPATIENT)
Dept: REHABILITATION | Facility: HOSPITAL | Age: 39
End: 2019-04-17
Payer: MEDICAID

## 2019-04-17 DIAGNOSIS — R47.01 BROCA'S APHASIA: ICD-10-CM

## 2019-04-17 PROCEDURE — 92507 TX SP LANG VOICE COMM INDIV: CPT | Mod: PN

## 2019-04-17 NOTE — PROGRESS NOTES
"Outpatient Neurological Rehabilitation   Speech and Language Therapy Daily Note  Date:  4/17/2019     Name: Noy Bourne   MRN: [id number]   Therapy Diagnosis:   Encounter Diagnosis   Name Primary?    Broca's aphasia    Physician: Johnson, D'Amico C., MD  Physician Orders: speech therapy evaluate and treat  Medical Diagnosis: I69.391 (ICD-10-CM) - Dysphagia as late effect of cerebrovascular accident (CVA)     Visit #/ Visits Authorized: 6/50   Date of Evaluation: 4/1/2019  Insurance Authorization Period: 03/19/2019 to12/31/2019   Plan of Care Certification:    4/1/2019 to 5/31/19  Visits Cancelled: 0  Visits No Show: 0    Time In:  1455  Time Out:  1535  Total Billable Time: 40    Precautions: Standard    Subjective:   Pt reports: Pleasant; no complaints. Pt reports "I am okay."  He was compliant to home exercise program. Returned and reviewed.  Response to previous treatment: I'm doing better. That (homework) was easy. 17/20  (85%) on "Sentence Completion" homework worksheet.   Pain Scale:  0/10 on VAS currently.   Pain Location: n/a  Objective:   TIMED  Procedure Min.             UNTIMED  Procedure Min.   Speech- Language- Voice Therapy  40 minutes       Total Timed Units: 0  Total Untimed Units: 1  Charges Billed/# of units: 1    Short Term Goals: (4 weeks) Current Progress:           2. Pt will participate in LAMINE by reading sentences in unison with therapist with 80% acc to improve reading comprehension and expressive language.     Progressing/ Not Met 4/17/2019   -Not addressed this session.   3. Pt will describe picture utilizing CAAST with 80% acc given min A to improve functional communication at home.    Progressing/ Not Met 4/17/2019   -Not addressed this session.    4. Pt will participate in Semantic Feature Analysis with 70% acc given min-mod A to improve language skills.     Progressing/ Not Met 4/17/2019   -Not addressed this session.      5. Pt will answer auditory processing questions with 70% " acc given min-mod A to improve auditory comprehension.     Progressing/ Not Met 4/17/2019   -Answering yes/no questions with 72% acc independently; min 100% verbal/visual A  -Decreased rate of speech with pause in the middle.    6. Pt will name concrete category members x 15 with 90% acc given min A to improve word finding skills.     Progressing/ Not Met 4/17/2019   -Not addressed this session.   7. Pt will form grammatically correct sentences to describe a picture with adequate fluency with 80% acc given min A to improve syntactic skills and speech fluency.    Progressing/ Not Met 4/17/2019   -mod A with consistent verbal cues throughout    8. Pt will write single words with 80% acc given min A to improve auditory processing and writing to dictation skills.  -4-letter words x 9 with 78% acc independently; min 100% verbal, phonemic, and visual A  -5 letter words x 3 with 67% acc independently; min 100% verbal/visual A  -utilized alphabet chart   -prompted pt to write given word twice upon incorrect spelling for reinforcement.      GOALS MET  1. Pt will participate in assessment of reading and writing skills utilizing the WAB -Revised Part 2.     Patient Education/Response:   Educated on goals and plan of care. Pt reported understanding.     Written Home Exercises Provided: yes. Source for Aphasia Therapy: [employer]. 1999, pg. 154   Exercises were reviewed and Noy was able to demonstrate them prior to the end of the session.  Noy demonstrated good  understanding of the education provided.     Assessment:   Noy is progressing well towards his goals.  Pt performs better when instructions and all interactions are presented with a decreased rate of speech. Patient exhibited significant increase in writing 4-letter words; introduced 5-letter words, min A provided.Current goals remain appropriate. Goals to be updated as necessary.    Pt prognosis is Good. Pt will continue to benefit from skilled  "outpatient speech and language therapy to address the deficits listed in the problem list on initial evaluation, provide pt/family education and to maximize pt's level of independence in the home and community environment.     Medical necessity is demonstrated by the following IMPAIRMENTS:  Pt's Broca's aphasia makes it difficult to communicate a message in an emergency.   Barriers to Therapy: none  Pt's spiritual, cultural and educational needs considered and pt agreeable to plan of care and goals.    Plan:   Continue POC with focus on speech and language.   Updated POC due 5/31/19    [employer].    Clinician    "I, Yvonne Hedrick, certify that I was present in the room directing the student and service delivery and guiding them using my skilled judgement. As the co-signing therapist, I have reviewed the student's documentation, and am responsible for the treatment, assessment and plan."     Yvonne Hedrick, CCC-SLP   4/17/2019   "

## 2019-04-29 ENCOUNTER — TELEPHONE (OUTPATIENT)
Dept: REHABILITATION | Facility: HOSPITAL | Age: 39
End: 2019-04-29

## 2019-05-07 ENCOUNTER — CLINICAL SUPPORT (OUTPATIENT)
Dept: REHABILITATION | Facility: HOSPITAL | Age: 39
End: 2019-05-07
Payer: MEDICAID

## 2019-05-07 DIAGNOSIS — R47.01 BROCA'S APHASIA: ICD-10-CM

## 2019-05-07 PROCEDURE — 92507 TX SP LANG VOICE COMM INDIV: CPT | Mod: PN

## 2019-05-07 NOTE — PROGRESS NOTES
"Outpatient Neurological Rehabilitation   Speech and Language Therapy Daily Note  Date:  5/7/2019     Name: Noy Bourne   MRN: [id number]   Therapy Diagnosis:   Encounter Diagnosis   Name Primary?    Broca's aphasia    Physician: Johnson, D'Amico C., MD  Physician Orders: speech therapy evaluate and treat  Medical Diagnosis: I69.391 (ICD-10-CM) - Dysphagia as late effect of cerebrovascular accident (CVA)     Visit #/ Visits Authorized: 7/50   Date of Evaluation: 4/1/2019  Insurance Authorization Period: 03/19/2019 to12/31/2019   Plan of Care Certification:    4/1/2019 to 5/31/19  Visits Cancelled: 0  Visits No Show: 4    Time In: 1015  Time Out:  1100  Total Billable Time: 45    Precautions: Standard    Subjective:   Pt reports: Pleasant; no complaints. Pt reports "I am okay."  He was compliant to home exercise program. He forgot his previous worksheet at home.   Response to previous treatment: I'm doing better.  Pain Scale:  0/10 on VAS currently.   Pain Location: n/a  Objective:   TIMED  Procedure Min.             UNTIMED  Procedure Min.   Speech- Language- Voice Therapy  45 minutes       Total Timed Units: 0  Total Untimed Units: 1  Charges Billed/# of units: 1    Short Term Goals: (4 weeks) Current Progress:           2. Pt will participate in LAMINE by reading sentences in unison with therapist with 80% acc to improve reading comprehension and expressive language.     Progressing/ Not Met 5/7/2019   LAMINE:   -Read 4 and 5 word sentences: x 27 words with 100% acc Independently  -Point: x 8 with 88% acc Independently  -Say: x 8 with 100% acc Independently   3. Pt will describe picture utilizing CAAST with 80% acc given min A to improve functional communication at home.    Progressing/ Not Met 5/7/2019   -Not addressed this session.    4. Pt will participate in Semantic Feature Analysis with 70% acc given min-mod A to improve language skills.     Progressing/ Not Met 5/7/2019   -Not addressed this session.    "   5. Pt will answer auditory processing questions with 70% acc given min-mod A to improve auditory comprehension.     Progressing/ Not Met 5/7/2019   -Answering yes/no questions with 70% acc independently; min 100% verbal A     6. Pt will name concrete category members x 15 with 90% acc given min A to improve word finding skills.     Progressing/ Not Met 5/7/2019   -Zoo animals: x 7 Independently in 60 seconds; x 7 min-mod A untimed  -Appliances: x 5 Independently in 60 seconds; x 6 min A untimed       7. Pt will form grammatically correct sentences to describe a picture with adequate fluency with 80% acc given min A to improve syntactic skills and speech fluency.    Progressing/ Not Met 5/7/2019   - x 10 with 70% acc Independently with min 100% A with consistent verbal cues throughout    8. Pt will write single words with 80% acc given min A to improve auditory processing and writing to dictation skills.  -Not addressed this session.      Completed a hearing screening: tested at 1,000 Hz, 2,000 Hz, and 4,000 Hz in each ear  -Right ear: able to hear all frequencies   -Left ear: able to hear all frequencies     GOALS MET  1. Pt will participate in assessment of reading and writing skills utilizing the WAB -Revised Part 2.     Patient Education/Response:   Educated on goals and plan of care. Pt reported understanding.     Written Home Exercises Provided: yes. Source for Aphasia Therapy, pg. 143-144.  Copyright 1999. [employer]  Exercises were reviewed and Noy was able to demonstrate them prior to the end of the session.  Noy demonstrated good  understanding of the education provided.     Assessment:   Noy is progressing well towards his goals.  Pt performs better when instructions and all interactions are presented with a decreased rate of speech. Current goals remain appropriate. Goals to be updated as necessary.    Pt prognosis is Good. Pt will continue to benefit from skilled outpatient speech and  "language therapy to address the deficits listed in the problem list on initial evaluation, provide pt/family education and to maximize pt's level of independence in the home and community environment.     Medical necessity is demonstrated by the following IMPAIRMENTS:  Pt's Broca's aphasia makes it difficult to communicate a message in an emergency.   Barriers to Therapy: none  Pt's spiritual, cultural and educational needs considered and pt agreeable to plan of care and goals.    Plan:   Continue POC with focus on speech and language.   Updated POC due 5/31/19.    [employer].    Clinician    "I, Yvonne Hedrick, certify that I was present in the room directing the student and service delivery and guiding them using my skilled judgement. As the co-signing therapist, I have reviewed the student's documentation, and am responsible for the treatment, assessment and plan."     Yvonne Hedrick, CCC-SLP   5/7/2019   "

## 2019-05-08 ENCOUNTER — CLINICAL SUPPORT (OUTPATIENT)
Dept: REHABILITATION | Facility: HOSPITAL | Age: 39
End: 2019-05-08
Payer: MEDICAID

## 2019-05-08 DIAGNOSIS — R47.01 BROCA'S APHASIA: ICD-10-CM

## 2019-05-08 PROCEDURE — 92507 TX SP LANG VOICE COMM INDIV: CPT | Mod: PN

## 2019-05-08 NOTE — PROGRESS NOTES
"Outpatient Neurological Rehabilitation   Speech and Language Therapy Daily Note  Date:  5/8/2019     Name: Noy Bourne   MRN: [id number]   Therapy Diagnosis:   Encounter Diagnosis   Name Primary?    Broca's aphasia    Physician: Johnson, D'Amico C., MD  Physician Orders: speech therapy evaluate and treat  Medical Diagnosis: I69.391 (ICD-10-CM) - Dysphagia as late effect of cerebrovascular accident (CVA)     Visit #/ Visits Authorized: 8/50   Date of Evaluation: 4/1/2019  Insurance Authorization Period: 03/19/2019 to12/31/2019   Plan of Care Certification:    4/1/2019 to 5/31/19  Visits Cancelled: 0  Visits No Show: 4    Time In: 1015  Time Out:  1100  Total Billable Time: 45    Precautions: Standard    Subjective:   Pt reports: Pleasant; no complaints. Pt reports "I good."  He was compliant to home exercise program. He forgot his previous worksheet at home.   Response to previous treatment: I'm doing better.  Pain Scale:  0/10 on VAS currently.   Pain Location: n/a  Objective:   TIMED  Procedure Min.             UNTIMED  Procedure Min.   Speech- Language- Voice Therapy  45 minutes       Total Timed Units: 0  Total Untimed Units: 1  Charges Billed/# of units: 1    Short Term Goals: (4 weeks) Current Progress:           2. Pt will participate in LMAINE by reading sentences in unison with therapist with 80% acc to improve reading comprehension and expressive language.     Progressing/ Not Met 5/8/2019   -Not addressed this session.   3. Pt will describe picture utilizing CAAST with 80% acc given min A to improve functional communication at home.    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 Pt will participate in Semantic Feature Analysis with " "70% acc given min-mod A to improve language skills.     Progressing/ Not Met 5/8/2019   -Not addressed this session.      5. Pt will answer auditory processing questions with 70% acc given min-mod A to improve auditory comprehension.     Progressing/ Not Met 5/8/2019   -Answering yes/no questions x 15 with 73% acc independently; 100% acc given mod A     6. Pt will name concrete category members x 15 with 90% acc given min A to improve word finding skills.     Progressing/ Not Met 5/8/2019   -Things in an office: x 8 Independently in 60 seconds; x 5 min A untimed  -[place]: x 13 Independently in 60 seconds; x 7 min A untimed       7. Pt will form grammatically correct sentences to describe a picture with adequate fluency with 80% acc given min A to improve syntactic skills and speech fluency.    Progressing/ Not Met 5/8/2019   - x 10 with min A   8. Pt will write single words with 80% acc given min A to improve auditory processing and writing to dictation skills.  -4-letter words with 50% acc Independently; visual cues x 3, semantic cues x 3, self-correct x 1      GOALS MET  1. Pt will participate in assessment of reading and writing skills utilizing the WAB -Revised Part 2.     Patient Education/Response:   Educated on goals and plan of care. Pt reported understanding.     Written Home Exercises Provided: yes. "Completing Common Sentences" from Source for Aphasia Therapy, pg. 151 Copyright 1999. [employer] And "Developing Sentences with Given Word"  Exercises were reviewed and Noy was able to demonstrate them prior to the end of the session.  Noy demonstrated good  understanding of the education provided.     Assessment:   Noy is progressing well towards his goals.  Pt performs better when instructions and all interactions are presented with a decreased rate of speech. Increase in answering yes/no questions to improve auditory comprehension. Increase in naming concrete category members to " "improve word finding skills. Current goals remain appropriate. Goals to be updated as necessary.    Pt prognosis is Good. Pt will continue to benefit from skilled outpatient speech and language therapy to address the deficits listed in the problem list on initial evaluation, provide pt/family education and to maximize pt's level of independence in the home and community environment.     Medical necessity is demonstrated by the following IMPAIRMENTS:  Pt's Broca's aphasia makes it difficult to communicate a message in an emergency.   Barriers to Therapy: none  Pt's spiritual, cultural and educational needs considered and pt agreeable to plan of care and goals.    Plan:   Continue POC with focus on speech and language.   Updated POC due 5/31/19.    [employer].    Clinician    "I, Yvonne Hedrick, certify that I was present in the room directing the student and service delivery and guiding them using my skilled judgement. As the co-signing therapist, I have reviewed the student's documentation, and am responsible for the treatment, assessment and plan."     Yvonne Hedrick, CCC-SLP   5/8/2019   "

## 2019-05-15 ENCOUNTER — CLINICAL SUPPORT (OUTPATIENT)
Dept: REHABILITATION | Facility: HOSPITAL | Age: 39
End: 2019-05-15
Payer: MEDICAID

## 2019-05-15 DIAGNOSIS — R47.01 BROCA'S APHASIA: ICD-10-CM

## 2019-05-15 PROCEDURE — 92507 TX SP LANG VOICE COMM INDIV: CPT | Mod: PN

## 2019-05-15 NOTE — PROGRESS NOTES
"Outpatient Neurological Rehabilitation   Speech and Language Therapy Daily Note  Date:  5/15/2019     Name: Noy Bourne   MRN: [id number]   Therapy Diagnosis:   Encounter Diagnosis   Name Primary?    Broca's aphasia    Physician: Johnson, D'Amico C., MD  Physician Orders: speech therapy evaluate and treat  Medical Diagnosis: I69.391 (ICD-10-CM) - Dysphagia as late effect of cerebrovascular accident (CVA)     Visit #/ Visits Authorized: 9/50   Date of Evaluation: 4/1/2019  Insurance Authorization Period: 03/19/2019 to12/31/2019   Plan of Care Certification:    4/1/2019 to 5/31/19  Visits Cancelled: 1  Visits No Show: 4    Time In: 1015  Time Out:  1100  Total Billable Time: 45    Precautions: Standard    Subjective:   Pt reports: Pleasant; no complaints. Pt reports "I'm good."  He was compliant to home exercise program. He forgot his previous worksheet at home.   Response to previous treatment: I'm doing better.  Pain Scale:  0/10 on VAS currently.   Pain Location: n/a  Objective:   TIMED  Procedure Min.             UNTIMED  Procedure Min.   Speech- Language- Voice Therapy  45 minutes       Total Timed Units: 0  Total Untimed Units: 1  Charges Billed/# of units: 1    Short Term Goals: (4 weeks) Current Progress:           2. Pt will participate in LAMINE by reading sentences in unison with therapist with 80% acc to improve reading comprehension and expressive language.     Progressing/ Not Met 5/15/2019   -LAMINE:  -Read 6 word sentences: x 54 with 96% acc Independently  -Point: x 9 with 100% acc Independently  -Say: x 9 with 89% acc Independently       3. Pt will describe picture utilizing CAAST with 80% acc given min A to improve functional communication at home.    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    4. Pt will participate in Semantic Feature Analysis with 70% acc given min-mod A to improve language skills.     Progressing/ " Not Met 5/15/2019   -Not addressed this session.      5. Pt will answer auditory processing questions with 70% acc given min-mod A to improve auditory comprehension.     Progressing/ Not Met 5/15/2019   -Answering yes/no questions x 15 with 53% acc independently; 100% acc given mod A     6. Pt will name concrete category members x 15 with 90% acc given min A to improve word finding skills.     Progressing/ Not Met 5/15/2019   -Things in a kitchen: x 14 Independently in 60 seconds; perseveration x 1  -Furniture: x 3 Independently in 60 seconds; x 7 mod A untimed       7. Pt will form grammatically correct sentences to describe a picture with adequate fluency with 80% acc given min A to improve syntactic skills and speech fluency.    Progressing/ Not Met 5/15/2019   - x 10 with 70% acc Independently with adequate fluency; min A   8. Pt will write single words with 80% acc given min A to improve auditory processing and writing to dictation skills.  -4-letter words x 15 with 60% acc Independently; mod 100% visual/phonemic/semantic A      GOALS MET  1. Pt will participate in assessment of reading and writing skills utilizing the WAB -Revised Part 2.     Patient Education/Response:   Educated on goals and plan of care. Pt reported understanding.     Written Home Exercises Provided: yes.   Exercises were reviewed and Noy was able to demonstrate them prior to the end of the session.  Noy demonstrated good  understanding of the education provided.     Assessment:   Noy is progressing well towards his goals.  Pt performs better when instructions and all interactions are presented with a decreased rate of speech. Decrease in answering yes/no questions. Significant increase in naming concrete category members to improve word finding skills. Current goals remain appropriate. Goals to be updated as necessary.    Pt prognosis is Good. Pt will continue to benefit from skilled outpatient speech and language therapy to  "address the deficits listed in the problem list on initial evaluation, provide pt/family education and to maximize pt's level of independence in the home and community environment.     Medical necessity is demonstrated by the following IMPAIRMENTS:  Pt's Broca's aphasia makes it difficult to communicate a message in an emergency.   Barriers to Therapy: none  Pt's spiritual, cultural and educational needs considered and pt agreeable to plan of care and goals.    Plan:   Continue POC with focus on speech and language.   Updated POC due 5/31/19.    [employer].    Clinician    "I, Yvonne Hedrick, certify that I was present in the room directing the student and service delivery and guiding them using my skilled judgement. As the co-signing therapist, I have reviewed the student's documentation, and am responsible for the treatment, assessment and plan."     Yvonne Hedrick, CCC-SLP   5/15/2019   "

## 2019-05-22 ENCOUNTER — CLINICAL SUPPORT (OUTPATIENT)
Dept: OTOLARYNGOLOGY | Facility: CLINIC | Age: 39
End: 2019-05-22
Payer: MEDICAID

## 2019-05-22 ENCOUNTER — OFFICE VISIT (OUTPATIENT)
Dept: OTOLARYNGOLOGY | Facility: CLINIC | Age: 39
End: 2019-05-22
Payer: MEDICAID

## 2019-05-22 VITALS
DIASTOLIC BLOOD PRESSURE: 84 MMHG | TEMPERATURE: 97 F | BODY MASS INDEX: 32.49 KG/M2 | HEART RATE: 64 BPM | WEIGHT: 214.38 LBS | HEIGHT: 68 IN | SYSTOLIC BLOOD PRESSURE: 113 MMHG

## 2019-05-22 DIAGNOSIS — H93.13 TINNITUS AURIUM, BILATERAL: Primary | ICD-10-CM

## 2019-05-22 DIAGNOSIS — H69.93 ETD (EUSTACHIAN TUBE DYSFUNCTION), BILATERAL: ICD-10-CM

## 2019-05-22 DIAGNOSIS — H69.93 ETD (EUSTACHIAN TUBE DYSFUNCTION), BILATERAL: Primary | ICD-10-CM

## 2019-05-22 DIAGNOSIS — H93.13 TINNITUS AURIUM, BILATERAL: ICD-10-CM

## 2019-05-22 DIAGNOSIS — H91.90 PERCEIVED HEARING LOSS: ICD-10-CM

## 2019-05-22 DIAGNOSIS — J30.89 NON-SEASONAL ALLERGIC RHINITIS, UNSPECIFIED TRIGGER: ICD-10-CM

## 2019-05-22 PROCEDURE — 99213 OFFICE O/P EST LOW 20 MIN: CPT | Mod: PBBFAC,PN,25 | Performed by: OTOLARYNGOLOGY

## 2019-05-22 PROCEDURE — 99999 PR PBB SHADOW E&M-EST. PATIENT-LVL I: CPT | Mod: PBBFAC,,, | Performed by: AUDIOLOGIST-HEARING AID FITTER

## 2019-05-22 PROCEDURE — 99999 PR PBB SHADOW E&M-EST. PATIENT-LVL I: ICD-10-PCS | Mod: PBBFAC,,, | Performed by: AUDIOLOGIST-HEARING AID FITTER

## 2019-05-22 PROCEDURE — 92552 PURE TONE AUDIOMETRY AIR: CPT | Mod: PBBFAC,PN | Performed by: AUDIOLOGIST-HEARING AID FITTER

## 2019-05-22 PROCEDURE — 99999 PR PBB SHADOW E&M-EST. PATIENT-LVL III: ICD-10-PCS | Mod: PBBFAC,,, | Performed by: OTOLARYNGOLOGY

## 2019-05-22 PROCEDURE — 92567 TYMPANOMETRY: CPT | Mod: PBBFAC,PN | Performed by: AUDIOLOGIST-HEARING AID FITTER

## 2019-05-22 PROCEDURE — 99203 OFFICE O/P NEW LOW 30 MIN: CPT | Mod: S$PBB,,, | Performed by: OTOLARYNGOLOGY

## 2019-05-22 PROCEDURE — 99999 PR PBB SHADOW E&M-EST. PATIENT-LVL III: CPT | Mod: PBBFAC,,, | Performed by: OTOLARYNGOLOGY

## 2019-05-22 PROCEDURE — 92556 SPEECH AUDIOMETRY COMPLETE: CPT | Mod: PBBFAC,PN | Performed by: AUDIOLOGIST-HEARING AID FITTER

## 2019-05-22 PROCEDURE — 99203 PR OFFICE/OUTPT VISIT, NEW, LEVL III, 30-44 MIN: ICD-10-PCS | Mod: S$PBB,,, | Performed by: OTOLARYNGOLOGY

## 2019-05-22 PROCEDURE — 99211 OFF/OP EST MAY X REQ PHY/QHP: CPT | Mod: PBBFAC,27,PN,25 | Performed by: AUDIOLOGIST-HEARING AID FITTER

## 2019-05-22 RX ORDER — LEVOCETIRIZINE DIHYDROCHLORIDE 5 MG/1
5 TABLET, FILM COATED ORAL NIGHTLY
Qty: 30 TABLET | Refills: 11 | Status: SHIPPED | OUTPATIENT
Start: 2019-05-22 | End: 2021-05-12

## 2019-05-22 NOTE — LETTER
May 22, 2019      D'Amico C. Johnson, MD  [address] [place] [address]  [address]  [facility] [address]           [facility]  [address] [place] [address], [address]  [facility] [address]  Phone: [phone]  Fax: [phone]          Patient: Noy Bourne   MR Number: [id number]   YOB: 1980   Date of Visit: 5/22/2019       Dear Dr. D'Amico C. Johnson:    Thank you for referring Noy Bourne to me for evaluation. Attached you will find relevant portions of my assessment and plan of care.    If you have questions, please do not hesitate to call me. I look forward to following Noy Bourne along with you.    Sincerely,    Fabby Brooks MD    Enclosure  CC:  No Recipients    If you would like to receive this communication electronically, please contact [email or web address] or [phone] to request more information on [facility] access.    For providers and/or their staff who would like to refer a patient to [facility], please contact us through our one-stop-shop provider referral line, [facility] [place], at [phone].    If you feel you have received this communication in error or would no longer like to receive these types of communications, please e-mail [email or web address]

## 2019-05-22 NOTE — PROGRESS NOTES
Chief Complaint   Patient presents with    Hearing Loss   .     HPI:  Noy Bourne is a very pleasant 38 y.o. male here to see me today for the first time for evaluation of hearing loss. He reports that he thinks his hearing is decreased since his CVA about 1 year ago.   He reports hearing loss that has been gradually progressing over the last 1 years.  He has not noted any difference in hearing between the ears, with both ears being the worse hearing ear.  He has noted any tinnitus in both ears.  He has not had any recent issues with ear pain or ear drainage.  He denies a family history of hearing loss, and has not had any previous otologic surgery.  He denies any history of significant loud noise exposure.He denies issues with dizziness.    He does note bilateral nasal congestion for several years.  He feels it is gradually worsening.  He has noted associated postnasal drip intermittently.  He notes aural pressure intermittently.  He has been on Flonase which he feels is somewhat helpful.  He feels the symptoms are worse when he is outdoors.    Past Medical History:   Diagnosis Date    DVT (deep venous thrombosis)     High cholesterol     Hypertension     Stroke      Social History     Socioeconomic History    Marital status:      Spouse name: Not on file    Number of children: Not on file    Years of education: Not on file    Highest education level: Not on file   Occupational History    Not on file   Social Needs    Financial resource strain: Not on file    Food insecurity:     Worry: Not on file     Inability: Not on file    Transportation needs:     Medical: Not on file     Non-medical: Not on file   Tobacco Use    Smoking status: Current Some Day Smoker    Smokeless tobacco: Never Used    Tobacco comment: quit smoking 7.11.18   Substance and Sexual Activity    Alcohol use: Yes     Alcohol/week: 1.2 oz     Types: 2 Cans of beer per week     Frequency: Monthly or less      Drinks per session: 1 or 2     Binge frequency: Never    Drug use: No    Sexual activity: Not on file   Lifestyle    Physical activity:     Days per week: Not on file     Minutes per session: Not on file    Stress: Not on file   Relationships    Social connections:     Talks on phone: Not on file     Gets together: Not on file     Attends [employer] service: Not on file     Active member of club or organization: Not on file     Attends meetings of clubs or organizations: Not on file     Relationship status: Not on file   Other Topics Concern    Not on file   Social History Narrative    ** Merged History Encounter **          Past Surgical History:   Procedure Laterality Date    ABDOMINAL SURGERY      AORTIC ARCH REPAIR      AORTIC VALVE SURGERY      DIAPHRAGM SURGERY       Family History   Problem Relation Age of Onset    Hypertension Mother     Diabetes Mother     Cancer Father          Review of Systems  General: negative for chills, fever or weight loss  Psychological: negative for mood changes or depression  Ophthalmic: negative for blurry vision, photophobia or eye pain  ENT: see HPI  Respiratory: no cough, shortness of breath, or wheezing  Cardiovascular: no chest pain or dyspnea on exertion  Gastrointestinal: no abdominal pain, change in bowel habits, or black/ bloody stools  Musculoskeletal: negative for gait disturbance or muscular weakness  Neurological: no syncope or seizures; no ataxia  Dermatological: negative for puritis,  rash and jaundice  Hematologic/lymphatic: no easy bruising, no new lumps or bumps      Physical Exam:    Vitals:    05/22/19 1010   BP: 113/84   Pulse: 64   Temp: 97.2 °F (36.2 °C)       Constitutional: Well appearing / communicating without difficutly.  NAD.  Eyes: EOM I Bilaterally  Head/Face: Normocephalic.  Negative paranasal sinus pressure/tenderness.  Salivary glands WNL.  House Brackmann I Bilaterally.    Right Ear: Auricle normal appearance. External Auditory  Canal within normal limits no lesions or masses,TM w/o masses/lesions/perforations. TM mobility noted.   Left Ear: Auricle normal appearance. External Auditory Canal within normal limits no lesions or masses,TM w/o masses/lesions/perforations. TM mobility noted.  Rinne Air conduction >bone conduction bilaterally, Lomeli midline.   Nose: No gross nasal septal deviation. Inferior Turbinates 3+ bilaterally. No septal perforation. No masses/lesions. External nasal skin appears normal without masses/lesions.  Oral Cavity: Gingiva/lips within normal limits.  Dentition/gingiva healthy appearing. Mucus membranes moist. Floor of mouth soft, no masses palpated. Oral Tongue mobile. Hard Palate appears normal.    Oropharynx: Base of tongue appears normal. No masses/lesions noted. Tonsillar fossa/pharyngeal wall without lesions. Posterior oropharynx WNL.  Soft palate without masses. Midline uvula.   Neck/Lymphatic: No LAD I-VI bilaterally.  No thyromegaly.  No masses noted on exam.    Mirror laryngoscopy/nasopharyngoscopy: Active gag reflex.  Unable to perform.    Neuro/Psychiatric: AOx3.  Normal mood and affect.   Cardiovascular: Normal carotid pulses bilaterally, no increasing jugular venous distention noted at cervical region bilaterally.    Respiratory: Normal respiratory effort, no stridor, no retractions noted.      Audiogram reviewed personally by myself and in detail with the patient today.           Assessment:    ICD-10-CM ICD-9-CM    1. ETD (Eustachian tube dysfunction), bilateral H69.83 381.81    2. Non-seasonal allergic rhinitis, unspecified trigger J30.89 477.8    3. Perceived hearing loss H90.5 389.8    4. Tinnitus aurium, bilateral H93.13 388.31      The primary encounter diagnosis was ETD (Eustachian tube dysfunction), bilateral. Diagnoses of Non-seasonal allergic rhinitis, unspecified trigger, Perceived hearing loss, and Tinnitus aurium, bilateral were also pertinent to this visit.      Plan:  No orders of the  defined types were placed in this encounter.     We had a long discussion regarding the anatomy and function of the eustachian tube.  We discussed that the eustachian tube acts as a pump to keep the appropriate amount of pressure behind the ear drum.    Reassurance regarding normal hearing.   Continue Flonase daily  Start Xyzal 5 mg p.o. Q.h.s.    Thank you kindly for allowing me to participate in the patient's care.       Fabby Brooks MD

## 2019-05-22 NOTE — PROGRESS NOTES
Sharon Gallegos, CCC-A  [facility] [address]  [address]  [place] [address]      Patient: Noy Bourne   MRN: [id number]   : 1980  ART: 2019    AUDIOLOGICAL EVALUATION    CASE HISTORY:    Noy Bourne, 38 y.o., was seen on the above date for an audiological evaluation. Patient reported suspected hearing loss and periodic tinnitus. No further history significant to hearing loss was reported.    TEST RESULTS:    Otoscopy was unremarkable for both ears.   Imittance testing of both ears revealed normal middle ear compliance (Type A).  Speech reception thresholds were obtained at 10 dB HL and 15 dB HL, in the right and left ears, respectively, which was consistent with pure tone results.   Word recognitions scores of 100% were obtained in both ears using a presentation level of 50 dB HL in the right ear and 55 dB HL in the left ear.    IMPRESSION:   Audiological testing indicated that Noy Bourne has normal hearing in both ears.    RECOMMENDATIONS:   There are no audiological recommendations at this time.    If you should have any questions or concerns regarding the above information, please do not hesitate to contact me at [phone].      _______________________________  Ehsan Gallegos, CCC-A  Clinical Audiologist    enclosure: audiogram

## 2019-05-28 ENCOUNTER — CLINICAL SUPPORT (OUTPATIENT)
Dept: REHABILITATION | Facility: HOSPITAL | Age: 39
End: 2019-05-28
Payer: MEDICAID

## 2019-05-28 DIAGNOSIS — R47.01 BROCA'S APHASIA: ICD-10-CM

## 2019-05-28 PROCEDURE — 92507 TX SP LANG VOICE COMM INDIV: CPT | Mod: PN

## 2019-05-28 NOTE — PROGRESS NOTES
"Outpatient Neurological Rehabilitation   Speech and Language Therapy Daily Note  Date:  5/28/2019     Name: Noy Bourne   MRN: [id number]   Therapy Diagnosis:   Encounter Diagnosis   Name Primary?    Broca's aphasia    Physician: Johnson, D'Amico C., MD  Physician Orders: speech therapy evaluate and treat  Medical Diagnosis: I69.391 (ICD-10-CM) - Dysphagia as late effect of cerebrovascular accident (CVA)     Visit #/ Visits Authorized: 10/50   Date of Evaluation: 4/1/2019  Insurance Authorization Period: 03/19/2019 to12/31/2019   Plan of Care Certification:    4/1/2019 to 5/31/19  Visits Cancelled: 1  Visits No Show: 4    Time In: 1015  Time Out:  1100  Total Billable Time: 45    Precautions: Standard    Subjective:   Pt reports: Pleasant; no complaints. Pt reports "I'm good."  He was compliant to home exercise program. Forming sentences with given word 4/6 with 67% acc Independently; Completing common sentences with 95% acc Independently   Response to previous treatment: I'm doing better.  Pain Scale:  0/10 on VAS currently.   Pain Location: n/a  Objective:   TIMED  Procedure Min.             UNTIMED  Procedure Min.   Speech- Language- Voice Therapy  45 minutes       Total Timed Units: 0  Total Untimed Units: 1  Charges Billed/# of units: 1    Short Term Goals: (4 weeks) Current Progress:           2. Pt will participate in LAMINE by reading sentences in unison with therapist with 80% acc to improve reading comprehension and expressive language.     GOAL MET x 2    Progressing/ Not Met 5/28/2019   -LAMINE:  -Read 6 word sentences with 80% acc Independently; self-correction x 2  -Point: x 10 with 90% acc Independently  -Say: x 10 with 90% acc Independently       3. Pt will describe picture utilizing CAAST with 80% acc given min A to improve functional communication at home.    Progressing/ Not Met 5/28/2019   CAAST: Combined Aphasia and Apraxia of Speech Treatment  -Not addressed this session.    4. Pt will " participate in Semantic Feature Analysis with 70% acc given min-mod A to improve language skills.     Progressing/ Not Met 5/28/2019   -Not addressed this session.      5. Pt will answer auditory processing questions with 70% acc given min-mod A to improve auditory comprehension.     Progressing/ Not Met 5/28/2019   -Answering yes/no questions x 15 with 20% acc independently; 73% acc given mod A     6. Pt will name concrete category members x 15 with 90% acc given min A to improve word finding skills.     Progressing/ Not Met 5/28/2019   -Not addressed this session.        7. Pt will form grammatically correct sentences to describe a picture with adequate fluency with 80% acc given min A to improve syntactic skills and speech fluency.    Progressing/ Not Met 5/28/2019   - x 10 with 40% acc Independently with adequate fluency; 90% acc given mod A  -Had to prompt pt to slow down his rate of speech when reading sentences aloud   8. Pt will write single words with 80% acc given min A to improve auditory processing and writing to dictation skills.  -4-letter words x 11 with 55% acc Independently; 91% phonemic/semantic mod A   Repeat minimal pairs x 36 with 75% acc Independently      GOALS MET  1. Pt will participate in assessment of reading and writing skills utilizing the WAB -Revised Part 2.   2. Pt will participate in LAMINE by reading sentences in unison with therapist with 80% acc to improve reading comprehension and expressive language. GOAL MET 5/28/19      Patient Education/Response:   Educated on goals and plan of care. Pt reported understanding.     Written Home Exercises Provided: yes. Completing Common Sentences from [employer], Copyright [employer].   Exercises were reviewed and Noy was able to demonstrate them prior to the end of the session.  Noy demonstrated good  understanding of the education provided.     Assessment:   Noy is progressing well towards his goals. Pt  "performs better when instructions and all interactions are presented with a decreased rate of speech. Met 1 goal this therapy session. Decrease in answering yes/no questions. Decrease in forming grammatically correct sentences to describe a picture; had to prompt pt to slow down his rate of speech as he read sentences aloud. Current goals remain appropriate. Goals to be updated as necessary.    Pt prognosis is Good. Pt will continue to benefit from skilled outpatient speech and language therapy to address the deficits listed in the problem list on initial evaluation, provide pt/family education and to maximize pt's level of independence in the home and community environment.     Medical necessity is demonstrated by the following IMPAIRMENTS:  Pt's Broca's aphasia makes it difficult to communicate a message in an emergency.   Barriers to Therapy: none  Pt's spiritual, cultural and educational needs considered and pt agreeable to plan of care and goals.    Plan:   Continue POC with focus on speech and language.   Updated POC due 5/31/19.    [employer].    Clinician    "I, Yvonne Hedrick, certify that I was present in the room directing the student and service delivery and guiding them using my skilled judgement. As the co-signing therapist, I have reviewed the student's documentation, and am responsible for the treatment, assessment and plan."     Yvonne Hedrick, CCC-SLP   5/28/2019   "

## 2019-05-28 NOTE — Clinical Note
To Dr. D'Amico JohnsonFrom Rachael Rayes, M.S.CCC-Patrick Bourne updated speech therapy plan of care. Please sign and fax back to [phone] if you agree with plan of care. Thank you.

## 2019-05-28 NOTE — PLAN OF CARE
"Outpatient Neurological Rehabilitation Therapy  Updated POC     Date: 5/28/2019   Name: Noy Bourne  Clinic Number: [id number]    Therapy Diagnosis:   Encounter Diagnosis   Name Primary?    Broca's aphasia      Physician: Johnson, D'Amico C., MD    Physician Orders: speech therapy evaluate and treat  Medical Diagnosis: I69.391 (ICD-10-CM) - Dysphagia as late effect of cerebrovascular accident (CVA)     Visit #/ Visits Authorized:  10/50   Evaluation Date: 4/1/19  Insurance Authorization Period: 03/19/2019 to12/31/2019   Plan of Care Expiration:    5/31/19  New POC Certification Period:  5/28/19 to 7/26/19    Precautions:Standard     Subjective     Update: Pt's wife reports improvement in speech and understanding at home.  However, pt "just agrees with people when he doesn't know what they are saying."  Wife is aware that decreased rate of speech helps improve pt's understanding due to his delayed auditory processing.      Objective     Update: see follow up note dated 5/28/2019    Assessment     Update: Noy Bourne presents to [facility] s/p medical diagnosis of CVA Demonstrates impairments including limitations as described in the problem list.  He presents with Broca's aphasia c/b mild difficulty word finding, describing pictures fluently and correct grammar, delayed processing, comprehension. Mild broca's aphasia c/b halting, telegraphic speech, paraphasias, moderate word-finding difficulty, agrammatic, nonfluent aphasia. Slow processing noted. Improvement noted in word finding and fluency in conversation.  Positive prognostic factors include age. Negative prognostic factors include attendance. Barriers to progress include attendance. Patient will benefit from skilled, outpatient neurological rehabilitation speech therapy.    ALEX NOMS (National Outcome Measure System):  Spoken Language Expression  LEVEL 4: The individual is successfully able to initiate communication " using spoken language in simple, structured conversations in routine daily activities with familiar communication partners. The individual usually requires moderate cueing, but is able to demonstrate use of simple sentences (i.e., semantics, syntax, and morphology) and rarely uses complex sentences/messages.    Rehab Potential: good     Education: Plan of Care, role of SLP in care, course of medical disease affect on therapy diagnosis , scheduling/ cancellation policy and insurance limitations / visit limit      Previous Short Term Goals Status: 4 weeks  1. Pt will participate in assessment of reading and writing skills utilizing the WAB -Revised Part 2. GOAL MET  2. Pt will participate in LAMINE by reading sentences in unison with therapist with 80% acc to improve reading comprehension and expressive language. GOAL MET 5/28/19  3. Pt will describe picture utilizing CAAST with 80% acc given min A to improve functional communication at home... Progressing, not met 5/28/19  4. Pt will participate in Semantic Feature Analysis with 70% acc given min-mod A to improve language skills... Progressing, not met 5/28/19  5. Pt will answer auditory processing questions with 70% acc given min-mod A to improve auditory comprehension... Progressing, not met 5/28/19  6. Pt will name concrete category members x 15 with 90% acc given min A to improve word finding skills... Progressing, not met 5/28/19  7. Pt will form grammatically correct sentences to describe a picture with adequate fluency with 80% acc given min A to improve syntactic skills and speech fluency... Progressing, not met 5/28/19     New Short Term Goals: 4 weeks  8. Pt will write single words with 80% acc given min A to improve auditory processing and writing to dictation skills.   9. Pt will repeat minimal pairs with 80% acc with min A to improve speech sound recognition.     Long Term Goal Status:  8 weeks  1. Pt will develop functional cognitive-linguistic based  skills and utilize compensatory strategies to communicate wants and needs effectively to different conversational partners, maintain safety and participate socially in functional living environment.      Reasons for Recertification of Therapy: Pt met 2 goals since initial plan of care. Pt is progressing towards all other goals.  Two new goals developed. Pt would benefit from continued speech therapy services to address his aphasia and delayed auditory processing.      Plan     Updated Certification Period: 5/28/2019 to 7/26/19  Recommended Treatment Plan: Patient will participate in the [facility] program for speech therapy 2 times per week for 8 weeks to address his  Communication deficits, to educate patient and their family, and to participate in a home exercise program.     Other recommendations: none at this time     Therapist's Name:  Yvonne Hedrick CCC-SLP   5/28/2019      I CERTIFY THE NEED FOR THESE SERVICES FURNISHED UNDER THIS PLAN OF TREATMENT AND WHILE UNDER MY CARE    Physician's comments:     Physician's Name:

## 2019-06-18 DIAGNOSIS — I63.9 CEREBROVASCULAR ACCIDENT (CVA), UNSPECIFIED MECHANISM: ICD-10-CM

## 2019-06-18 DIAGNOSIS — I71.20 THORACIC AORTIC ANEURYSM WITHOUT RUPTURE: ICD-10-CM

## 2019-06-18 NOTE — TELEPHONE ENCOUNTER
----- Message from Dl Lai sent at 6/18/2019  3:19 PM CDT -----  PHARMACY CALL A FEW TIMES PT REALLY NEED REFILL ON clopidogrel (PLAVIX) 75 mg tablet, atorvastatin (LIPITOR) 80 MG tablet, N folic acid (FOLVITE) 1 MG tablet

## 2019-06-19 RX ORDER — CLOPIDOGREL BISULFATE 75 MG/1
75 TABLET ORAL DAILY
Qty: 90 TABLET | Refills: 2 | Status: SHIPPED | OUTPATIENT
Start: 2019-06-19 | End: 2019-10-31

## 2019-06-19 RX ORDER — ATORVASTATIN CALCIUM 80 MG/1
80 TABLET, FILM COATED ORAL DAILY
Qty: 90 TABLET | Refills: 2 | Status: SHIPPED | OUTPATIENT
Start: 2019-06-19 | End: 2019-10-31

## 2019-06-19 RX ORDER — FOLIC ACID 1 MG/1
1 TABLET ORAL DAILY
Qty: 30 TABLET | Refills: 3 | Status: SHIPPED | OUTPATIENT
Start: 2019-06-19 | End: 2019-12-13 | Stop reason: SDUPTHER

## 2019-09-26 ENCOUNTER — OFFICE VISIT (OUTPATIENT)
Dept: FAMILY MEDICINE | Facility: HOSPITAL | Age: 39
End: 2019-09-26
Attending: FAMILY MEDICINE
Payer: MEDICAID

## 2019-09-26 VITALS
DIASTOLIC BLOOD PRESSURE: 77 MMHG | HEIGHT: 68 IN | HEART RATE: 78 BPM | BODY MASS INDEX: 31.48 KG/M2 | SYSTOLIC BLOOD PRESSURE: 112 MMHG | WEIGHT: 207.69 LBS

## 2019-09-26 DIAGNOSIS — J32.9 SINUSITIS, UNSPECIFIED CHRONICITY, UNSPECIFIED LOCATION: Primary | ICD-10-CM

## 2019-09-26 PROCEDURE — 99214 OFFICE O/P EST MOD 30 MIN: CPT | Performed by: STUDENT IN AN ORGANIZED HEALTH CARE EDUCATION/TRAINING PROGRAM

## 2019-09-26 RX ORDER — AMOXICILLIN AND CLAVULANATE POTASSIUM 500; 125 MG/1; MG/1
1 TABLET, FILM COATED ORAL 2 TIMES DAILY
Qty: 10 TABLET | Refills: 0 | Status: SHIPPED | OUTPATIENT
Start: 2019-09-26 | End: 2019-10-01

## 2019-09-26 NOTE — PATIENT INSTRUCTIONS
Sinusitis (Antibiotic Treatment)    The sinuses are air-filled spaces within the bones of the face. They connect to the inside of the nose. Sinusitis is an inflammation of the tissue lining the sinus cavity. Sinus inflammation can occur during a cold. It can also be due to allergies to pollens and other particles in the air. Sinusitis can cause symptoms of sinus congestion and fullness. A sinus infection causes fever, headache and facial pain. There is often green or yellow drainage from the nose or into the back of the throat (post-nasal drip). You have been given antibiotics to treat this condition.  Home care:  · Take the full course of antibiotics as instructed. Do not stop taking them, even if you feel better.  · Drink plenty of water, hot tea, and other liquids. This may help thin mucus. It also may promote sinus drainage.  · Heat may help soothe painful areas of the face. Use a towel soaked in hot water. Or,  the shower and direct the hot spray onto your face. Using a vaporizer along with a menthol rub at night may also help.   · An expectorant containing guaifenesin may help thin the mucus and promote drainage from the sinuses.  · Over-the-counter decongestants may be used unless a similar medicine was prescribed. Nasal sprays work the fastest. Use one that contains phenylephrine or oxymetazoline. First blow the nose gently. Then use the spray. Do not use these medicines more often than directed on the label or symptoms may get worse. You may also use tablets containing pseudoephedrine. Avoid products that combine ingredients, because side effects may be increased. Read labels. You can also ask the pharmacist for help. (NOTE: Persons with high blood pressure should not use decongestants. They can raise blood pressure.)  · Over-the-counter antihistamines may help if allergies contributed to your sinusitis.    · Do not use nasal rinses or irrigation during an acute sinus infection, unless told to by  your health care provider. Rinsing may spread the infection to other sinuses.  · Use acetaminophen or ibuprofen to control pain, unless another pain medicine was prescribed. (If you have chronic liver or kidney disease or ever had a stomach ulcer, talk with your doctor before using these medicines. Aspirin should never be used in anyone under 18 years of age who is ill with a fever. It may cause severe liver damage.)  · Don't smoke. This can worsen symptoms.  Follow-up care  Follow up with your healthcare provider or our staff if you are not improving within the next week.  When to seek medical advice  Call your healthcare provider if any of these occur:  · Facial pain or headache becoming more severe  · Stiff neck  · Unusual drowsiness or confusion  · Swelling of the forehead or eyelids  · Vision problems, including blurred or double vision  · Fever of 100.4ºF (38ºC) or higher, or as directed by your healthcare provider  · Seizure  · Breathing problems  · Symptoms not resolving within 10 days  Date Last Reviewed: 4/13/2015  © 6451-1766 [employer] [address] [place] [address], [place] [address]. All rights reserved. This information is not intended as a substitute for professional medical care. Always follow your healthcare professional's instructions.

## 2019-09-26 NOTE — PROGRESS NOTES
Subjective:       Patient ID: Noy Bourne is a 38 y.o. male.    Chief Complaint: Sinus Problem    Noy Bourne is a 38 y.o. Male with history of CVA, HTN, DVT and hyperlipidemia ho presents to the clinic with complaint of productive cough, congestion and sneezing x 3 days. The patient states the cough is productive of green phlegm. Recent sick contacts at home with similar symptoms. Denies any fever, chills, malaise or body aches. He does not a small headache. No difficulty swallowing or sore throat.     Review of Systems   Constitutional: Negative for activity change, chills, fatigue and fever.   HENT: Positive for congestion, rhinorrhea, sinus pressure, sinus pain and sneezing. Negative for ear pain.    Eyes: Negative for pain.   Respiratory: Positive for cough (productive green sputum ). Negative for apnea, chest tightness, shortness of breath and wheezing.    Cardiovascular: Negative for chest pain and palpitations.   Gastrointestinal: Negative for abdominal distention, abdominal pain, constipation, diarrhea, nausea and rectal pain.   Endocrine: Negative for polydipsia.   Genitourinary: Negative for flank pain.   Musculoskeletal: Negative for arthralgias, back pain, neck pain and neck stiffness.   Skin: Negative for rash.   Neurological: Negative for dizziness, tremors, facial asymmetry, speech difficulty and light-headedness.   Psychiatric/Behavioral: Negative for agitation.       Objective:      Vitals:    09/26/19 1506   BP: 112/77   Pulse: 78     Physical Exam   Constitutional: He is oriented to person, place, and time. He appears well-developed and well-nourished. He is cooperative.   HENT:   Head: Normocephalic and atraumatic.   Right Ear: External ear normal.   Left Ear: External ear normal.   Nose: Rhinorrhea present. Right sinus exhibits maxillary sinus tenderness and frontal sinus tenderness. Left sinus exhibits maxillary sinus tenderness and frontal sinus tenderness.   Mouth/Throat:  Oropharynx is clear and moist. No oropharyngeal exudate.   Eyes: Pupils are equal, round, and reactive to light. Conjunctivae and EOM are normal.   Neck: Normal range of motion. Neck supple.   Cardiovascular: Normal rate, regular rhythm and intact distal pulses. Exam reveals no gallop and no friction rub.   No murmur heard.  Pulmonary/Chest: Effort normal and breath sounds normal. No respiratory distress.   Abdominal: Soft. Bowel sounds are normal. He exhibits no distension. There is no tenderness. There is no guarding.   Musculoskeletal: Normal range of motion. He exhibits no edema.   Neurological: He is alert and oriented to person, place, and time.   Skin: Skin is warm and dry. Capillary refill takes less than 2 seconds.   Psychiatric: He has a normal mood and affect.   Nursing note and vitals reviewed.      Assessment:       1. Sinusitis, unspecified chronicity, unspecified location        Plan:       Noy was seen today for sinus problem.    Diagnoses and all orders for this visit:    Sinusitis, unspecified chronicity, unspecified location  -     amoxicillin-clavulanate 500-125mg (AUGMENTIN) 500-125 mg Tab; Take 1 tablet (500 mg total) by mouth 2 (two) times daily. for 5 days  -      Continue daily flonase       Follow up in about 6 months (around 3/26/2020).     D'Amico C Johnson, MD  9/26/2019  3:32 PM  [facility]   House Officer -II

## 2019-10-27 ENCOUNTER — HOSPITAL ENCOUNTER (EMERGENCY)
Facility: HOSPITAL | Age: 39
Discharge: HOME | End: 2019-10-27
Attending: EMERGENCY MEDICINE
Payer: MEDICAID

## 2019-10-27 VITALS
RESPIRATION RATE: 17 BRPM | DIASTOLIC BLOOD PRESSURE: 99 MMHG | OXYGEN SATURATION: 99 % | SYSTOLIC BLOOD PRESSURE: 148 MMHG | HEART RATE: 74 BPM | WEIGHT: 200 LBS | TEMPERATURE: 98 F | HEIGHT: 65 IN | BODY MASS INDEX: 33.32 KG/M2

## 2019-10-27 DIAGNOSIS — R10.12 LUQ PAIN: Primary | ICD-10-CM

## 2019-10-27 PROCEDURE — 99281 EMR DPT VST MAYX REQ PHY/QHP: CPT | Mod: ER

## 2019-10-27 NOTE — DISCHARGE INSTRUCTIONS
Thank you for choosing [facility]! We appreciate you coming to us for your medical care. We hope you feel better soon! Please come back to [facility] for all of your future medical needs.    Our goal in the emergency department is to always give you outstanding care and exceptional service. You may receive a survey by mail or e-mail in the next week regarding your experience in our ED. We would greatly appreciate your completing and returning the survey. Your feedback provides us with a way to recognize our staff who give very good care and it helps us learn how to improve when your experience was below our aspiration of excellence.       Sincerely,    Jr Hernandez MD  Medical Director  Emergency Department  [facility]

## 2019-10-28 NOTE — ED PROVIDER NOTES
"Encounter Date: 10/27/2019       History     Chief Complaint   Patient presents with    knot on my stomach     "I got a knot on my stomach on the side for like 2 months and its starting to stick me on the inside" denies trauma. Points to left abd area     HPI   This is a 38 y.o. male who has a past medical history of DVT (deep venous thrombosis), High cholesterol, Hypertension, and Stroke.     The patient presents to the Emergency Department with knot in his stomach x2 months.  Patient states that he feels a lump on the left side of his upper abdomen.    Symptoms are associated with a occasional sticking pain in that area.  Pt denies nausea, vomiting, decreased appetite, constipation, diarrhea, fever, urinary complaints.   Symptoms are aggravated by nothing.  Pain comes on spontaneously, is fleeting and resolves spontaneously.  Symptoms are relieved by nothing.   Patient has no prior history of similar symptoms.       Review of patient's allergies indicates:  No Known Allergies  Past Medical History:   Diagnosis Date    DVT (deep venous thrombosis)     High cholesterol     Hypertension     Stroke      Past Surgical History:   Procedure Laterality Date    ABDOMINAL SURGERY      AORTIC ARCH REPAIR      AORTIC VALVE SURGERY      DIAPHRAGM SURGERY       Family History   Problem Relation Age of Onset    Hypertension Mother     Diabetes Mother     Cancer Father      Social History     Tobacco Use    Smoking status: Current Some Day Smoker    Smokeless tobacco: Never Used    Tobacco comment: quit smoking 7.11.18   Substance Use Topics    Alcohol use: Yes     Alcohol/week: 2.0 standard drinks     Types: 2 Cans of beer per week     Frequency: Monthly or less     Drinks per session: 1 or 2     Binge frequency: Never    Drug use: No     Review of Systems   Constitutional: Negative for activity change, appetite change and fever.   HENT: Negative for sore throat.    Respiratory: Negative for shortness of breath. "    Cardiovascular: Negative for chest pain.   Gastrointestinal: Positive for abdominal pain. Negative for blood in stool, constipation, diarrhea, nausea and vomiting.   Genitourinary: Negative for dysuria.   Musculoskeletal: Positive for back pain.   Skin: Negative for rash.   Neurological: Positive for speech difficulty (Prior stroke). Negative for dizziness, weakness and headaches.   Hematological: Does not bruise/bleed easily.   Psychiatric/Behavioral: The patient is not nervous/anxious.        Physical Exam     Initial Vitals [10/27/19 1023]   BP Pulse Resp Temp SpO2   (!) 145/106 74 17 97.6 °F (36.4 °C) 99 %      MAP       --         Physical Exam    Nursing note and vitals reviewed.  Constitutional: He appears well-developed and well-nourished. No distress.   HENT:   Head: Normocephalic and atraumatic.   Mouth/Throat: Oropharynx is clear and moist.   Eyes: Conjunctivae are normal. Pupils are equal, round, and reactive to light.   Neck: Normal range of motion. Neck supple.   Cardiovascular: Normal rate, regular rhythm, normal heart sounds and intact distal pulses.   Pulmonary/Chest: Breath sounds normal. No respiratory distress.   Abdominal: Soft. Bowel sounds are normal. He exhibits no distension. There is no tenderness.   Fatty tissue prominence on LUQ, not lipomatous, nontender, no overlying skin changes   Musculoskeletal: Normal range of motion. He exhibits no edema or tenderness.   Lymphadenopathy:     He has no cervical adenopathy.   Neurological: He is alert and oriented to person, place, and time. He has normal strength.   Skin: Skin is warm and dry. Capillary refill takes less than 2 seconds. No rash noted. No erythema.   Psychiatric: He has a normal mood and affect. Thought content normal.         ED Course   Procedures  Labs Reviewed - No data to display       Imaging Results    None          Medical Decision Making:   Initial Assessment:   This is an emergent evaluation of a 38 y.o.male patient with  presentation of prominence of skin/fatty tissue to LUQ. No underlying mass appreciated on deep palpation. Non-tender exam and no other hx features of GI issue such as n/v/d or constipation. Pt has prior hx of surgery so sticking pain might be MSK or from nerve injury from surgical cut..     Plan: discharge with regular follow up with PCP. NO emergent condition suspected at this time. No workup performed in the ED. Pt advised that if symptoms worsen he may return for eval.                        Clinical Impression:       ICD-10-CM ICD-9-CM   1. LUQ pain R10.12 789.02                                Jr Hernandez MD  10/28/19 0717

## 2019-10-31 ENCOUNTER — OFFICE VISIT (OUTPATIENT)
Dept: FAMILY MEDICINE | Facility: HOSPITAL | Age: 39
End: 2019-10-31
Attending: FAMILY MEDICINE
Payer: MEDICAID

## 2019-10-31 VITALS
HEART RATE: 77 BPM | WEIGHT: 211.63 LBS | BODY MASS INDEX: 31.34 KG/M2 | SYSTOLIC BLOOD PRESSURE: 112 MMHG | DIASTOLIC BLOOD PRESSURE: 78 MMHG | HEIGHT: 69 IN

## 2019-10-31 DIAGNOSIS — R10.12 LUQ PAIN: Primary | ICD-10-CM

## 2019-10-31 PROCEDURE — 99213 OFFICE O/P EST LOW 20 MIN: CPT | Performed by: STUDENT IN AN ORGANIZED HEALTH CARE EDUCATION/TRAINING PROGRAM

## 2019-10-31 RX ORDER — LEVOCETIRIZINE DIHYDROCHLORIDE 5 MG/1
TABLET, FILM COATED ORAL
Refills: 11 | COMMUNITY
Start: 2019-10-07 | End: 2019-10-31

## 2019-11-04 ENCOUNTER — TELEPHONE (OUTPATIENT)
Dept: FAMILY MEDICINE | Facility: HOSPITAL | Age: 39
End: 2019-11-04
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
## 2019-11-04 NOTE — TELEPHONE ENCOUNTER
----- Message from Dayan Keller sent at 11/4/2019 12:41 PM CST -----  [facility] calling to let dr know that losartan-hydrochlorothiazide 50-12.5 mg (HYZAAR) 50-12.5 mg per tablet is on back order. Pharmacy want's to know if dr can call in something else

## 2019-11-06 ENCOUNTER — HOSPITAL ENCOUNTER (OUTPATIENT)
Dept: RADIOLOGY | Facility: HOSPITAL | Age: 39
Discharge: HOME | End: 2019-11-06
Attending: STUDENT IN AN ORGANIZED HEALTH CARE EDUCATION/TRAINING PROGRAM
Payer: MEDICAID

## 2019-11-06 DIAGNOSIS — R10.12 LUQ PAIN: ICD-10-CM

## 2019-11-06 PROCEDURE — 76700 US EXAM ABDOM COMPLETE: CPT | Mod: TC,PO

## 2019-11-07 NOTE — PROGRESS NOTES
I assume primary medical responsibility for this patient. I have reviewed the history, physical, and assessement & treatment plan with the resident and agree that the care is reasonable and necessary. This service has been performed by a resident without the presence of a teaching physician under the primary care exception. If necessary, an addendum of additional findings or evaluation beyond the resident documentation will be noted below.    Martha Healy MD

## 2019-11-08 RX ORDER — HYDROCHLOROTHIAZIDE 12.5 MG/1
12.5 TABLET ORAL DAILY
Qty: 90 TABLET | Refills: 3 | Status: SHIPPED | OUTPATIENT
Start: 2019-11-08 | End: 2020-03-01 | Stop reason: CLARIF

## 2019-11-08 RX ORDER — LOSARTAN POTASSIUM 50 MG/1
50 TABLET ORAL DAILY
Qty: 90 TABLET | Refills: 3 | Status: SHIPPED | OUTPATIENT
Start: 2019-11-08 | End: 2020-03-01 | Stop reason: CLARIF

## 2019-12-15 RX ORDER — FOLIC ACID 1 MG/1
1 TABLET ORAL DAILY
Qty: 30 TABLET | Refills: 3 | Status: SHIPPED | OUTPATIENT
Start: 2019-12-15 | End: 2020-03-17 | Stop reason: SDUPTHER

## 2020-01-29 ENCOUNTER — TELEPHONE (OUTPATIENT)
Dept: FAMILY MEDICINE | Facility: HOSPITAL | Age: 40
End: 2020-01-29

## 2020-03-01 ENCOUNTER — HOSPITAL ENCOUNTER (EMERGENCY)
Facility: HOSPITAL | Age: 40
Discharge: HOME | End: 2020-03-01
Attending: EMERGENCY MEDICINE
Payer: MEDICAID

## 2020-03-01 VITALS
DIASTOLIC BLOOD PRESSURE: 84 MMHG | SYSTOLIC BLOOD PRESSURE: 140 MMHG | TEMPERATURE: 98 F | RESPIRATION RATE: 15 BRPM | OXYGEN SATURATION: 96 % | HEART RATE: 100 BPM

## 2020-03-01 DIAGNOSIS — K02.9 DENTAL DECAY: ICD-10-CM

## 2020-03-01 DIAGNOSIS — Z76.0 MEDICATION REFILL: Primary | ICD-10-CM

## 2020-03-01 PROCEDURE — 99284 EMERGENCY DEPT VISIT MOD MDM: CPT | Mod: ER

## 2020-03-01 RX ORDER — AMOXICILLIN 875 MG/1
875 TABLET, FILM COATED ORAL 2 TIMES DAILY
Qty: 14 TABLET | Refills: 0 | Status: SHIPPED | OUTPATIENT
Start: 2020-03-01 | End: 2020-05-25

## 2020-03-01 RX ORDER — METOPROLOL SUCCINATE 50 MG/1
50 TABLET, EXTENDED RELEASE ORAL DAILY
Qty: 30 TABLET | Refills: 0 | Status: SHIPPED | OUTPATIENT
Start: 2020-03-01 | End: 2020-05-22 | Stop reason: SDUPTHER

## 2020-03-01 NOTE — DISCHARGE INSTRUCTIONS
You are  advised to follow up with  your primary care provider for re-evaluation within 3 days.  You are instructed to return to the emergency department immediately for any new or worsening symptoms.

## 2020-03-01 NOTE — ED PROVIDER NOTES
Encounter Date: 3/1/2020       History     Chief Complaint   Patient presents with    Medication Refill     I need my blood pressure medicine i am out and im leaving to go out of town.      39-year-old male presents to the emergency department for medication refill.  He reports that he took his last dose of metoprolol this morning.  He reports that he is going out of town tomorrow a needs a refill of his blood pressure medications.  He reports that he was unable to get in touch with his primary care provider. He denies any headache, dizziness, neck pain, chest pain, abdominal pain, nausea or vomiting. Patient reports that he began to have tooth pain this morning that is been constant since onset.  He reports that he took some Tylenol this morning with relief of pain but wanted to get the tooth checked.          Review of patient's allergies indicates:  No Known Allergies  Past Medical History:   Diagnosis Date    DVT (deep venous thrombosis)     High cholesterol     Hypertension     Stroke      Past Surgical History:   Procedure Laterality Date    ABDOMINAL SURGERY      AORTIC ARCH REPAIR      AORTIC VALVE SURGERY      DIAPHRAGM SURGERY       Family History   Problem Relation Age of Onset    Hypertension Mother     Diabetes Mother     Cancer Father      Social History     Tobacco Use    Smoking status: Current Some Day Smoker     Packs/day: 0.50     Types: Cigarettes    Smokeless tobacco: Never Used   Substance Use Topics    Alcohol use: Yes     Alcohol/week: 2.0 standard drinks     Types: 2 Cans of beer per week     Frequency: Monthly or less     Drinks per session: 1 or 2     Binge frequency: Never    Drug use: No     Review of Systems   Constitutional: Negative for activity change, appetite change and fever.   HENT: Positive for dental problem. Negative for congestion, facial swelling and sore throat.    Respiratory: Negative for shortness of breath.    Cardiovascular: Negative for chest pain.    Gastrointestinal: Negative for nausea.   Genitourinary: Negative for dysuria.   Musculoskeletal: Negative for back pain.   Skin: Negative for rash.   Neurological: Negative for dizziness, weakness and headaches.   Hematological: Does not bruise/bleed easily.       Physical Exam     Initial Vitals [03/01/20 1121]   BP Pulse Resp Temp SpO2   (!) 140/84 100 15 98 °F (36.7 °C) 96 %      MAP       --         Physical Exam    Nursing note and vitals reviewed.  Constitutional: He appears well-developed and well-nourished. He is not diaphoretic. No distress.   HENT:   Head: Normocephalic and atraumatic.   Right Ear: External ear normal.   Left Ear: External ear normal.   Mouth/Throat: Uvula is midline and oropharynx is clear and moist. Dental caries present. No dental abscesses or uvula swelling. No oropharyngeal exudate.       Eyes: Conjunctivae are normal.   Neck: Neck supple.   Cardiovascular: Normal rate, regular rhythm and normal heart sounds.   Pulmonary/Chest: Breath sounds normal. No respiratory distress. He has no wheezes. He has no rhonchi. He has no rales. He exhibits no tenderness.   Neurological: He is alert and oriented to person, place, and time.   Skin: Skin is warm and dry.   Psychiatric: He has a normal mood and affect.         ED Course   Procedures  Labs Reviewed - No data to display       Imaging Results    None          Medical Decision Making:   Initial Assessment:   39-year-old male presents for medication refill.  Physical exam reveals a nontoxic-appearing male in no acute distress. Patient is afebrile and vital are limits. Neurological exam reveals an alert and oriented patient.  No facial swelling noted.  Dental decay noted throughout mouth.  Extensive dental decay noted tooth number 6 with minimal surrounding erythema and edema noted to gumline.  No drainable abscess noted. Posterior pharynx reveals no erythema, edema or tonsillar exudate.  Lungs clear to auscultation bilaterally.  Differential  Diagnosis:   Medication refill  Dental pain  Dental decay    ED Management:  Instructed the patient to follow up with his primary care provider and dentist for re-evaluation and to return to the emergency department for any new or worsening symptoms                                  Clinical Impression:       ICD-10-CM ICD-9-CM   1. Medication refill Z76.0 V68.1                                Alexandria Kolb PA-C  03/01/20 1143       Alexandria Kolb PA-C  03/01/20 1152       Alexandria Kolb PA-C  03/01/20 1153

## 2020-03-17 DIAGNOSIS — I69.320 CVA, OLD, APHASIA: Primary | ICD-10-CM

## 2020-03-17 RX ORDER — FOLIC ACID 1 MG/1
1 TABLET ORAL DAILY
Qty: 90 TABLET | Refills: 1 | Status: SHIPPED | OUTPATIENT
Start: 2020-03-17 | End: 2020-05-22 | Stop reason: SDUPTHER

## 2020-03-17 RX ORDER — CLOPIDOGREL BISULFATE 75 MG/1
75 TABLET ORAL DAILY
Qty: 90 TABLET | Refills: 1 | Status: SHIPPED | OUTPATIENT
Start: 2020-03-17 | End: 2020-05-22 | Stop reason: SDUPTHER

## 2020-03-23 ENCOUNTER — DOCUMENTATION ONLY (OUTPATIENT)
Dept: REHABILITATION | Facility: HOSPITAL | Age: 40
End: 2020-03-23

## 2020-03-23 PROBLEM — R47.01 BROCA'S APHASIA: Status: RESOLVED | Noted: 2019-04-01 | Resolved: 2020-03-23

## 2020-03-23 NOTE — PROGRESS NOTES
Discharge  Patient: Noy Bourne  Date: 3/23/2020  MRN: [id number]    Pt was evaluated on 4/1/19 and was seen 10 times for ST. Pt has not attended ST since 5/28/19. Pt was given Home Program. Current status is unknown. Pt to be d/c'd at this time.    Yvonne Hedrick M.S.CCC-SLP  Speech Language Pathologist   3/23/2020

## 2020-03-25 RX ORDER — ATORVASTATIN CALCIUM 80 MG/1
80 TABLET, FILM COATED ORAL DAILY
Qty: 90 TABLET | Refills: 3 | Status: SHIPPED | OUTPATIENT
Start: 2020-03-25 | End: 2020-05-25 | Stop reason: SDUPTHER

## 2020-05-20 DIAGNOSIS — I69.320 CVA, OLD, APHASIA: ICD-10-CM

## 2020-05-22 RX ORDER — CLOPIDOGREL BISULFATE 75 MG/1
75 TABLET ORAL DAILY
Qty: 90 TABLET | Refills: 1 | Status: SHIPPED | OUTPATIENT
Start: 2020-05-22 | End: 2020-05-25 | Stop reason: SDUPTHER

## 2020-05-22 RX ORDER — METOPROLOL SUCCINATE 50 MG/1
50 TABLET, EXTENDED RELEASE ORAL DAILY
Qty: 30 TABLET | Refills: 0 | Status: SHIPPED | OUTPATIENT
Start: 2020-05-22 | End: 2021-05-06 | Stop reason: SDUPTHER

## 2020-05-22 RX ORDER — FOLIC ACID 1 MG/1
1 TABLET ORAL DAILY
Qty: 90 TABLET | Refills: 1 | Status: SHIPPED | OUTPATIENT
Start: 2020-05-22 | End: 2020-05-25 | Stop reason: SDUPTHER

## 2020-05-24 ENCOUNTER — HOSPITAL ENCOUNTER (EMERGENCY)
Facility: HOSPITAL | Age: 40
Discharge: HOME | End: 2020-05-24
Attending: EMERGENCY MEDICINE
Payer: COMMERCIAL

## 2020-05-24 VITALS
SYSTOLIC BLOOD PRESSURE: 139 MMHG | OXYGEN SATURATION: 98 % | HEART RATE: 60 BPM | DIASTOLIC BLOOD PRESSURE: 94 MMHG | RESPIRATION RATE: 17 BRPM | TEMPERATURE: 98 F

## 2020-05-24 DIAGNOSIS — R56.9 NEW ONSET SEIZURE: Primary | ICD-10-CM

## 2020-05-24 DIAGNOSIS — R56.9 SEIZURE: ICD-10-CM

## 2020-05-24 LAB
ALBUMIN SERPL BCP-MCNC: 3.6 G/DL (ref 3.5–5.2)
ALP SERPL-CCNC: 82 U/L (ref 55–135)
ALT SERPL W/O P-5'-P-CCNC: 30 U/L (ref 10–44)
AMPHET+METHAMPHET UR QL: NEGATIVE
ANION GAP SERPL CALC-SCNC: 13 MMOL/L (ref 8–16)
AST SERPL-CCNC: 25 U/L (ref 10–40)
BACTERIA #/AREA URNS AUTO: NORMAL /HPF
BARBITURATES UR QL SCN>200 NG/ML: NEGATIVE
BASOPHILS # BLD AUTO: 0.05 K/UL (ref 0–0.2)
BASOPHILS NFR BLD: 0.8 % (ref 0–1.9)
BENZODIAZ UR QL SCN>200 NG/ML: NEGATIVE
BILIRUB SERPL-MCNC: 0.3 MG/DL (ref 0.1–1)
BILIRUB UR QL STRIP: NEGATIVE
BUN SERPL-MCNC: 13 MG/DL (ref 6–20)
BZE UR QL SCN: NEGATIVE
CALCIUM SERPL-MCNC: 9.1 MG/DL (ref 8.7–10.5)
CANNABINOIDS UR QL SCN: NEGATIVE
CHLORIDE SERPL-SCNC: 108 MMOL/L (ref 95–110)
CLARITY UR REFRACT.AUTO: CLEAR
CO2 SERPL-SCNC: 17 MMOL/L (ref 23–29)
COLOR UR AUTO: YELLOW
CREAT SERPL-MCNC: 1.3 MG/DL (ref 0.5–1.4)
CREAT UR-MCNC: 109 MG/DL (ref 23–375)
DIFFERENTIAL METHOD: ABNORMAL
EOSINOPHIL # BLD AUTO: 0.1 K/UL (ref 0–0.5)
EOSINOPHIL NFR BLD: 1.7 % (ref 0–8)
ERYTHROCYTE [DISTWIDTH] IN BLOOD BY AUTOMATED COUNT: 12.5 % (ref 11.5–14.5)
EST. GFR  (AFRICAN AMERICAN): >60 ML/MIN/1.73 M^2
EST. GFR  (NON AFRICAN AMERICAN): >60 ML/MIN/1.73 M^2
ETHANOL SERPL-MCNC: <10 MG/DL
ETHANOL UR-MCNC: <10 MG/DL
GLUCOSE SERPL-MCNC: 109 MG/DL (ref 70–110)
GLUCOSE UR QL STRIP: NEGATIVE
HCT VFR BLD AUTO: 46.9 % (ref 40–54)
HGB BLD-MCNC: 15.7 G/DL (ref 14–18)
HGB UR QL STRIP: NEGATIVE
HYALINE CASTS UR QL AUTO: 0 /LPF
IMM GRANULOCYTES # BLD AUTO: 0.02 K/UL (ref 0–0.04)
IMM GRANULOCYTES NFR BLD AUTO: 0.3 % (ref 0–0.5)
KETONES UR QL STRIP: NEGATIVE
LEUKOCYTE ESTERASE UR QL STRIP: NEGATIVE
LYMPHOCYTES # BLD AUTO: 2.8 K/UL (ref 1–4.8)
LYMPHOCYTES NFR BLD: 46.5 % (ref 18–48)
MAGNESIUM SERPL-MCNC: 1.7 MG/DL (ref 1.6–2.6)
MCH RBC QN AUTO: 32.8 PG (ref 27–31)
MCHC RBC AUTO-ENTMCNC: 33.5 G/DL (ref 32–36)
MCV RBC AUTO: 98 FL (ref 82–98)
METHADONE UR QL SCN>300 NG/ML: NEGATIVE
MICROSCOPIC COMMENT: NORMAL
MONOCYTES # BLD AUTO: 0.5 K/UL (ref 0.3–1)
MONOCYTES NFR BLD: 8.4 % (ref 4–15)
NEUTROPHILS # BLD AUTO: 2.5 K/UL (ref 1.8–7.7)
NEUTROPHILS NFR BLD: 42.3 % (ref 38–73)
NITRITE UR QL STRIP: NEGATIVE
NRBC BLD-RTO: 0 /100 WBC
OPIATES UR QL SCN: NEGATIVE
PCP UR QL SCN>25 NG/ML: NEGATIVE
PH UR STRIP: 5 [PH] (ref 5–8)
PLATELET # BLD AUTO: 164 K/UL (ref 150–350)
PMV BLD AUTO: 9.5 FL (ref 9.2–12.9)
POTASSIUM SERPL-SCNC: 3.8 MMOL/L (ref 3.5–5.1)
PROT SERPL-MCNC: 7.1 G/DL (ref 6–8.4)
PROT UR QL STRIP: ABNORMAL
RBC # BLD AUTO: 4.78 M/UL (ref 4.6–6.2)
RBC #/AREA URNS AUTO: 0 /HPF (ref 0–4)
SODIUM SERPL-SCNC: 138 MMOL/L (ref 136–145)
SP GR UR STRIP: 1.02 (ref 1–1.03)
SQUAMOUS #/AREA URNS AUTO: 0 /HPF
TOXICOLOGY INFORMATION: NORMAL
TROPONIN I SERPL DL<=0.01 NG/ML-MCNC: 0.01 NG/ML (ref 0–0.03)
URN SPEC COLLECT METH UR: ABNORMAL
WBC # BLD AUTO: 5.96 K/UL (ref 3.9–12.7)
WBC #/AREA URNS AUTO: 0 /HPF (ref 0–5)

## 2020-05-24 PROCEDURE — 99284 EMERGENCY DEPT VISIT MOD MDM: CPT | Mod: 25

## 2020-05-24 PROCEDURE — 85025 COMPLETE CBC W/AUTO DIFF WBC: CPT

## 2020-05-24 PROCEDURE — 83735 ASSAY OF MAGNESIUM: CPT

## 2020-05-24 PROCEDURE — 93010 ELECTROCARDIOGRAM REPORT: CPT | Mod: ,,, | Performed by: INTERNAL MEDICINE

## 2020-05-24 PROCEDURE — 25000003 PHARM REV CODE 250: Performed by: EMERGENCY MEDICINE

## 2020-05-24 PROCEDURE — 84484 ASSAY OF TROPONIN QUANT: CPT

## 2020-05-24 PROCEDURE — 80307 DRUG TEST PRSMV CHEM ANLYZR: CPT

## 2020-05-24 PROCEDURE — 81001 URINALYSIS AUTO W/SCOPE: CPT | Mod: 59

## 2020-05-24 PROCEDURE — 80053 COMPREHEN METABOLIC PANEL: CPT

## 2020-05-24 PROCEDURE — 63600175 PHARM REV CODE 636 W HCPCS: Performed by: EMERGENCY MEDICINE

## 2020-05-24 PROCEDURE — 93005 ELECTROCARDIOGRAM TRACING: CPT

## 2020-05-24 PROCEDURE — 96365 THER/PROPH/DIAG IV INF INIT: CPT

## 2020-05-24 PROCEDURE — 99285 EMERGENCY DEPT VISIT HI MDM: CPT | Mod: 25

## 2020-05-24 PROCEDURE — 93010 EKG 12-LEAD: ICD-10-PCS | Mod: ,,, | Performed by: INTERNAL MEDICINE

## 2020-05-24 PROCEDURE — 80320 DRUG SCREEN QUANTALCOHOLS: CPT

## 2020-05-24 PROCEDURE — 99285 PR EMERGENCY DEPT VISIT,LEVEL V: ICD-10-PCS | Mod: ,,, | Performed by: EMERGENCY MEDICINE

## 2020-05-24 PROCEDURE — 99285 EMERGENCY DEPT VISIT HI MDM: CPT | Mod: ,,, | Performed by: EMERGENCY MEDICINE

## 2020-05-24 RX ORDER — LEVETIRACETAM 500 MG/1
500 TABLET ORAL 2 TIMES DAILY
Qty: 60 TABLET | Refills: 0 | Status: SHIPPED | OUTPATIENT
Start: 2020-05-24 | End: 2021-01-19

## 2020-05-24 RX ORDER — LEVETIRACETAM 10 MG/ML
1000 INJECTION INTRAVASCULAR
Status: COMPLETED | OUTPATIENT
Start: 2020-05-24 | End: 2020-05-24

## 2020-05-24 RX ORDER — ACETAMINOPHEN 500 MG
1000 TABLET ORAL
Status: COMPLETED | OUTPATIENT
Start: 2020-05-24 | End: 2020-05-24

## 2020-05-24 RX ADMIN — LEVETIRACETAM INJECTION 1000 MG: 10 INJECTION INTRAVENOUS at 10:05

## 2020-05-24 RX ADMIN — ACETAMINOPHEN 1000 MG: 500 TABLET ORAL at 10:05

## 2020-05-25 ENCOUNTER — OFFICE VISIT (OUTPATIENT)
Dept: FAMILY MEDICINE | Facility: HOSPITAL | Age: 40
End: 2020-05-25
Payer: COMMERCIAL

## 2020-05-25 VITALS
SYSTOLIC BLOOD PRESSURE: 120 MMHG | WEIGHT: 197.06 LBS | HEART RATE: 61 BPM | BODY MASS INDEX: 30.93 KG/M2 | DIASTOLIC BLOOD PRESSURE: 81 MMHG | HEIGHT: 67 IN

## 2020-05-25 DIAGNOSIS — I69.320 CVA, OLD, APHASIA: ICD-10-CM

## 2020-05-25 DIAGNOSIS — R56.9 SEIZURE: Primary | ICD-10-CM

## 2020-05-25 DIAGNOSIS — I71.20 THORACIC AORTIC ANEURYSM WITHOUT RUPTURE: ICD-10-CM

## 2020-05-25 DIAGNOSIS — I10 HYPERTENSION, UNSPECIFIED TYPE: ICD-10-CM

## 2020-05-25 PROCEDURE — 99213 OFFICE O/P EST LOW 20 MIN: CPT | Performed by: STUDENT IN AN ORGANIZED HEALTH CARE EDUCATION/TRAINING PROGRAM

## 2020-05-25 RX ORDER — LOSARTAN POTASSIUM 50 MG/1
50 TABLET ORAL DAILY
COMMUNITY
Start: 2020-05-18 | End: 2020-05-25 | Stop reason: SDUPTHER

## 2020-05-25 RX ORDER — ATORVASTATIN CALCIUM 80 MG/1
80 TABLET, FILM COATED ORAL DAILY
Qty: 90 TABLET | Refills: 3 | Status: SHIPPED | OUTPATIENT
Start: 2020-05-25 | End: 2020-06-04 | Stop reason: SDUPTHER

## 2020-05-25 RX ORDER — HYDROCHLOROTHIAZIDE 12.5 MG/1
12.5 CAPSULE ORAL DAILY
COMMUNITY
Start: 2020-04-26 | End: 2020-05-25 | Stop reason: SDUPTHER

## 2020-05-25 RX ORDER — ASPIRIN 81 MG/1
81 TABLET ORAL DAILY
Qty: 90 TABLET | Refills: 3 | Status: SHIPPED | OUTPATIENT
Start: 2020-05-25 | End: 2022-05-06 | Stop reason: SDUPTHER

## 2020-05-25 RX ORDER — METOPROLOL TARTRATE 50 MG/1
TABLET ORAL
Qty: 90 TABLET | Refills: 3 | Status: SHIPPED | OUTPATIENT
Start: 2020-05-25 | End: 2020-06-04

## 2020-05-25 RX ORDER — CLOPIDOGREL BISULFATE 75 MG/1
75 TABLET ORAL DAILY
Qty: 90 TABLET | Refills: 1 | Status: SHIPPED | OUTPATIENT
Start: 2020-05-25 | End: 2020-06-04 | Stop reason: SDUPTHER

## 2020-05-25 RX ORDER — LOSARTAN POTASSIUM 50 MG/1
50 TABLET ORAL DAILY
Qty: 90 TABLET | Refills: 3 | Status: SHIPPED | OUTPATIENT
Start: 2020-05-25 | End: 2020-06-04 | Stop reason: SDUPTHER

## 2020-05-25 RX ORDER — FOLIC ACID 1 MG/1
1 TABLET ORAL DAILY
Qty: 90 TABLET | Refills: 1 | Status: SHIPPED | OUTPATIENT
Start: 2020-05-25 | End: 2021-01-29 | Stop reason: SDUPTHER

## 2020-05-25 RX ORDER — HYDROCHLOROTHIAZIDE 12.5 MG/1
12.5 CAPSULE ORAL DAILY
Qty: 90 CAPSULE | Refills: 3 | Status: SHIPPED | OUTPATIENT
Start: 2020-05-25 | End: 2020-06-04 | Stop reason: SDUPTHER

## 2020-05-25 RX ORDER — METOPROLOL TARTRATE 50 MG/1
TABLET ORAL
COMMUNITY
Start: 2020-04-26 | End: 2020-05-25 | Stop reason: SDUPTHER

## 2020-05-25 NOTE — ED NOTES
Pt educated on need for urine specimen. Pt verbalized understanding. Urinal at bedside for pt voiding needs.
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
## 2020-05-25 NOTE — DISCHARGE INSTRUCTIONS
I recommend that you stay to be observed and evaluated by Neurology in the morning.  Despite that, you wish to leave.  Take Keppra twice a day.  Be sure to follow up with your neurologist as soon as possible.    According to at [facility] law, patients who experience a seizure or any episode with loss of consciousness or loss of ability to interact with the surrounding environment cannot drive or operate heavy machinery until they are seizure free for six months and should notify the local DMV. Caution should also be used in regards to swimming or having a bath unsupervised or climbing tall heights/ladders, cooking with oil or grease, etc. Patient have been instructions to followup with a neurologist within the next week to address their breakthrough seizure. They have also been instructions to return to the ED should they have another seizure episode prior to arrange for urgent outpatient followup.

## 2020-05-25 NOTE — ED TRIAGE NOTES
"Patient presents to ED via ems after witnessed seizure activity lasting approx 2 min. Patient was at walking around his home when he started feeling "funny." States he experiences mild blurry vision and went inside to check his blood pressure. As he was sitting down, wife states that he had 2min seizure with foaming at the mouth. Per wife "his body locked up and he started shaking, foaming, and then he came to and got really no tired." Denies Hx of seizures. EMS states that patient was post-ictal and confused upon arrival. Patient now a&o with some confusion about exact date. PERRLA 3mm. C/O mild 3/10 dull frontal HA without blurry vision/dizziness. Denies incontinence. Denies drug use. Reports drinking 3 drinks last night. Denies etoh today; denies daily alcohol use. Patient has Hx aortic arch repair secondary to aneurysm/tear. While intubated, patient experienced CVA. Deficits from CVA include Broca's aphasia with delayed responses. Per wife, she has to repeat questions multiple times for him to understand. +Blood thinners.  "

## 2020-05-27 NOTE — PROGRESS NOTES
Subjective                                                                                                                                                                           Chief Complaint: seizure follow up    HPI  Noy Bourne is a 39 y.o. male who  has a past medical history of DVT (deep venous thrombosis), High cholesterol, Hypertension, and Stroke. The patient presents to clinic after recent ED visit with seizure. Patient loaded with IV keppra. Pt was to stay overnight in observation but requested that he be discharged. Case discussed with on call neurology who advised that pt take keppra 500 mg BID and follow up with neurology outpatient. Requires referral at this visit as well of refills of medication. No repeat seizures since.     Health Maintenance   Topic Date Due    Lipid Panel  1982    TETANUS VACCINE  11/11/2000    Pneumococcal Vaccine (Medium Risk) (1 of 1 - PPSV23) 11/11/2001    High Dose Statin  05/25/2021    Aspirin/Antiplatelet Therapy  05/25/2021        Review of Systems   Constitutional: Negative for activity change, chills, fatigue and fever.   HENT: Negative for congestion and sinus pressure.    Eyes: Negative for visual disturbance.   Respiratory: Negative for chest tightness and shortness of breath.    Cardiovascular: Negative for chest pain.   Gastrointestinal: Negative for abdominal distention, abdominal pain, diarrhea, nausea and vomiting.   Endocrine: Negative for polyuria.   Genitourinary: Negative for frequency.   Musculoskeletal: Negative for arthralgias and myalgias.   Skin: Negative for color change.   Neurological: Positive for seizures and speech difficulty (residual deficits). Negative for dizziness, weakness and light-headedness.   Psychiatric/Behavioral: Negative for agitation and behavioral problems.        Past Medical History:   Diagnosis Date    DVT (deep venous thrombosis)     High cholesterol     Hypertension     Stroke        Past Surgical  "History:   Procedure Laterality Date    ABDOMINAL SURGERY      AORTIC ARCH REPAIR      AORTIC VALVE SURGERY      DIAPHRAGM SURGERY         Family History   Problem Relation Age of Onset    Hypertension Mother     Diabetes Mother     Cancer Father        Social History     Tobacco Use    Smoking status: Current Some Day Smoker     Packs/day: 0.50     Types: Cigarettes    Smokeless tobacco: Never Used   Substance Use Topics    Alcohol use: Yes     Alcohol/week: 2.0 standard drinks     Types: 2 Cans of beer per week     Frequency: Monthly or less     Drinks per session: 1 or 2     Binge frequency: Never    Drug use: No       Review of patient's allergies indicates:  No Known Allergies     Objective                                                                                                                                                                             Vitals:    05/25/20 1541   BP: 120/81   Pulse: 61   Weight: 89.4 kg (197 lb 1.5 oz)   Height: 5' 7" (1.702 m)      Body mass index is 30.87 kg/m².    Physical Exam   Constitutional: He is oriented to person, place, and time. He appears well-developed and well-nourished. No distress.   HENT:   Head: Normocephalic and atraumatic.   Eyes: Conjunctivae are normal.   Neck: Normal range of motion. Neck supple.   Cardiovascular: Normal rate and regular rhythm.   Pulmonary/Chest: Effort normal and breath sounds normal.   Abdominal: Soft. Bowel sounds are normal. He exhibits no distension. There is no tenderness.   Musculoskeletal: He exhibits no edema or tenderness.   Neurological: He is alert and oriented to person, place, and time.   Skin: Skin is warm.   Psychiatric: He has a normal mood and affect. His behavior is normal.   Nursing note and vitals reviewed.      Laboratory:  Lab Results   Component Value Date    WBC 5.96 05/24/2020    HGB 15.7 05/24/2020    HCT 46.9 05/24/2020    MCV 98 05/24/2020     05/24/2020       Chemistry      "   Component Value Date/Time     05/24/2020 2121    K 3.8 05/24/2020 2121     05/24/2020 2121    CO2 17 (L) 05/24/2020 2121    BUN 13 05/24/2020 2121    CREATININE 1.3 05/24/2020 2121     05/24/2020 2121        Component Value Date/Time    CALCIUM 9.1 05/24/2020 2121    ALKPHOS 82 05/24/2020 2121    AST 25 05/24/2020 2121    ALT 30 05/24/2020 2121    BILITOT 0.3 05/24/2020 2121    ESTGFRAFRICA >60.0 05/24/2020 2121    EGFRNONAA >60.0 05/24/2020 2121          Lab Results   Component Value Date    MG 1.7 05/24/2020     Assessment/Plan                                                                                                                                                                Noy Bourne is a 39 y.o. male who presents to clinic with:    1. Seizure    2. Hypertension, unspecified type    3. Thoracic aortic aneurysm without rupture    4. CVA, old, aphasia         Problem List Items Addressed This Visit        Neuro    CVA, old, aphasia    Relevant Medications    atorvastatin (LIPITOR) 80 MG tablet    aspirin (ECOTRIN) 81 MG EC tablet    clopidogreL (PLAVIX) 75 mg tablet    folic acid (FOLVITE) 1 MG tablet      Other Visit Diagnoses     Seizure    -  Primary    Relevant Orders    Ambulatory referral/consult to Neurology    Hypertension, unspecified type        Relevant Medications    aspirin (ECOTRIN) 81 MG EC tablet    hydroCHLOROthiazide (MICROZIDE) 12.5 mg capsule    losartan (COZAAR) 50 MG tablet    metoprolol tartrate (LOPRESSOR) 50 MG tablet    Thoracic aortic aneurysm without rupture              Medication List with Changes/Refills   Current Medications    FLUTICASONE (FLONASE) 50 MCG/ACTUATION NASAL SPRAY    1 spray (50 mcg total) by Each Nare route once daily.    LEVETIRACETAM (KEPPRA) 500 MG TAB    Take 1 tablet (500 mg total) by mouth 2 (two) times daily.    LEVOCETIRIZINE (XYZAL) 5 MG TABLET    Take 1 tablet (5 mg total) by mouth every evening.     LOSARTAN-HYDROCHLOROTHIAZIDE 50-12.5 MG (HYZAAR) 50-12.5 MG PER TABLET    Take 1 tablet by mouth once daily.    METOPROLOL SUCCINATE (TOPROL-XL) 50 MG 24 HR TABLET    Take 1 tablet (50 mg total) by mouth once daily.    NAPROXEN (NAPROSYN) 500 MG TABLET    Take 1 tablet (500 mg total) by mouth daily as needed.   Changed and/or Refilled Medications    Modified Medication Previous Medication    ASPIRIN (ECOTRIN) 81 MG EC TABLET aspirin (ECOTRIN) 81 MG EC tablet       Take 1 tablet (81 mg total) by mouth once daily.    Take 1 tablet (81 mg total) by mouth once daily.    ATORVASTATIN (LIPITOR) 80 MG TABLET atorvastatin (LIPITOR) 80 MG tablet       Take 1 tablet (80 mg total) by mouth once daily.    Take 1 tablet (80 mg total) by mouth once daily.    CLOPIDOGREL (PLAVIX) 75 MG TABLET clopidogreL (PLAVIX) 75 mg tablet       Take 1 tablet (75 mg total) by mouth once daily.    Take 1 tablet (75 mg total) by mouth once daily.    FOLIC ACID (FOLVITE) 1 MG TABLET folic acid (FOLVITE) 1 MG tablet       Take 1 tablet (1 mg total) by mouth once daily.    Take 1 tablet (1 mg total) by mouth once daily.    HYDROCHLOROTHIAZIDE (MICROZIDE) 12.5 MG CAPSULE hydroCHLOROthiazide (MICROZIDE) 12.5 mg capsule       Take 1 capsule (12.5 mg total) by mouth once daily.    Take 12.5 mg by mouth once daily.    LOSARTAN (COZAAR) 50 MG TABLET losartan (COZAAR) 50 MG tablet       Take 1 tablet (50 mg total) by mouth once daily.    Take 50 mg by mouth once daily.    METOPROLOL TARTRATE (LOPRESSOR) 50 MG TABLET metoprolol tartrate (LOPRESSOR) 50 MG tablet       TAKE 1 TABLET BY MOUTH ONCE DAILY WITH A MEAL    TAKE 1 TABLET BY MOUTH ONCE DAILY WITH A MEAL   Discontinued Medications    AMOXICILLIN (AMOXIL) 875 MG TABLET    Take 1 tablet (875 mg total) by mouth 2 (two) times daily.       Patient counseled about the importance of healthy dietary habits as well as routine physical activity and exercise for better health outcomes.    The patient's  diagnosis, medications, and proper use of medications were dicussed. The importance of close follow up to discuss labs, modify medications, and monitor any potential side effects was also discussed. The patient was also informed of the importance of any future cancer screening.     Follow up in about 3 months (around 8/25/2020). Follow up for further workup and reassessment or sooner as needed.    Patient expressed understanding after counseling regarding diagnosis and recommendations.    This note was generated using speech dictation software and therefore may contain errors.      Robert Zamarripa MD  [facility] PGY-3

## 2020-06-04 ENCOUNTER — OFFICE VISIT (OUTPATIENT)
Dept: FAMILY MEDICINE | Facility: HOSPITAL | Age: 40
End: 2020-06-04
Payer: COMMERCIAL

## 2020-06-04 VITALS
BODY MASS INDEX: 29.12 KG/M2 | DIASTOLIC BLOOD PRESSURE: 78 MMHG | SYSTOLIC BLOOD PRESSURE: 113 MMHG | WEIGHT: 196.63 LBS | HEART RATE: 68 BPM | HEIGHT: 69 IN

## 2020-06-04 DIAGNOSIS — I10 HYPERTENSION, UNSPECIFIED TYPE: ICD-10-CM

## 2020-06-04 DIAGNOSIS — G40.909 SEIZURE DISORDER: Primary | ICD-10-CM

## 2020-06-04 DIAGNOSIS — I69.320 CVA, OLD, APHASIA: ICD-10-CM

## 2020-06-04 PROCEDURE — 99213 OFFICE O/P EST LOW 20 MIN: CPT | Performed by: STUDENT IN AN ORGANIZED HEALTH CARE EDUCATION/TRAINING PROGRAM

## 2020-06-04 RX ORDER — LOSARTAN POTASSIUM 50 MG/1
50 TABLET ORAL DAILY
Qty: 90 TABLET | Refills: 3 | Status: SHIPPED | OUTPATIENT
Start: 2020-06-04 | End: 2021-07-01 | Stop reason: SDUPTHER

## 2020-06-04 RX ORDER — AMOXICILLIN 500 MG/1
CAPSULE ORAL
COMMUNITY
Start: 2020-05-29 | End: 2021-05-12

## 2020-06-04 RX ORDER — CLOPIDOGREL BISULFATE 75 MG/1
75 TABLET ORAL DAILY
Qty: 90 TABLET | Refills: 1 | Status: SHIPPED | OUTPATIENT
Start: 2020-06-04 | End: 2021-05-06 | Stop reason: SDUPTHER

## 2020-06-04 RX ORDER — HYDROCHLOROTHIAZIDE 12.5 MG/1
12.5 CAPSULE ORAL DAILY
Qty: 90 CAPSULE | Refills: 3 | Status: SHIPPED | OUTPATIENT
Start: 2020-06-04 | End: 2021-07-06 | Stop reason: SDUPTHER

## 2020-06-04 RX ORDER — ATORVASTATIN CALCIUM 80 MG/1
80 TABLET, FILM COATED ORAL DAILY
Qty: 90 TABLET | Refills: 3 | Status: SHIPPED | OUTPATIENT
Start: 2020-06-04 | End: 2021-07-06 | Stop reason: SDUPTHER

## 2020-06-04 NOTE — PROGRESS NOTES
Subjective:       Patient ID: Noy Bourne is a 39 y.o. male.    Chief Complaint: Medication Refill and letter    Noy Bourne is a 39 y.o. male who  has a past medical history of DVT (deep venous thrombosis), High cholesterol, Hypertension, and Stroke. The patient presents to clinic for follow up up seizure medications. The patient has been unable to see neurology. He reports taking the Keppra once a day instead of twice a day as directed. He has had no seizure activity. Denies fever, chills, headache or blurred vision.     Review of Systems   Constitutional: Negative for activity change, chills, fatigue and fever.   HENT: Negative for congestion and ear pain.    Eyes: Negative for pain.   Respiratory: Negative for apnea, chest tightness, shortness of breath and wheezing.    Cardiovascular: Negative for chest pain and palpitations.   Gastrointestinal: Negative for abdominal distention, abdominal pain, constipation, diarrhea, nausea and rectal pain.   Endocrine: Negative for polydipsia.   Genitourinary: Negative for flank pain.   Musculoskeletal: Negative for arthralgias, back pain, neck pain and neck stiffness.   Skin: Negative for rash.   Neurological: Negative for dizziness, tremors, facial asymmetry, speech difficulty and light-headedness.   Psychiatric/Behavioral: Negative for agitation.       Objective:      Vitals:    06/04/20 1320   BP: 113/78   Pulse: 68     Physical Exam   Constitutional: He is oriented to person, place, and time. He appears well-developed and well-nourished. He is cooperative.   HENT:   Head: Normocephalic and atraumatic.   Right Ear: External ear normal.   Left Ear: External ear normal.   Nose: Nose normal.   Mouth/Throat: Oropharynx is clear and moist.   Eyes: Pupils are equal, round, and reactive to light. Conjunctivae and EOM are normal.   Neck: Normal range of motion. Neck supple.   Cardiovascular: Normal rate, regular rhythm, normal heart sounds and intact distal  pulses. Exam reveals no gallop and no friction rub.   No murmur heard.  Pulmonary/Chest: Effort normal and breath sounds normal. No stridor. No respiratory distress. He has no wheezes.   Abdominal: Soft. Bowel sounds are normal. He exhibits no distension. There is no tenderness. There is no guarding.   Musculoskeletal: Normal range of motion. He exhibits no edema.   Neurological: He is alert and oriented to person, place, and time.   Skin: Skin is warm and dry.   Psychiatric: He has a normal mood and affect.   Nursing note and vitals reviewed.      Assessment:       1. Seizure disorder    2. CVA, old, aphasia    3. Hypertension, unspecified type        Plan:       Seizure disorder       -   appt made with neurology 06/17/2020 at 4: PM     CVA, old, aphasia  -     atorvastatin (LIPITOR) 80 MG tablet; Take 1 tablet (80 mg total) by mouth once daily.  Dispense: 90 tablet; Refill: 3  -     clopidogreL (PLAVIX) 75 mg tablet; Take 1 tablet (75 mg total) by mouth once daily.  Dispense: 90 tablet; Refill: 1    Hypertension, unspecified type  -     losartan (COZAAR) 50 MG tablet; Take 1 tablet (50 mg total) by mouth once daily.  Dispense: 90 tablet; Refill: 3  -     hydroCHLOROthiazide (MICROZIDE) 12.5 mg capsule; Take 1 capsule (12.5 mg total) by mouth once daily.  Dispense: 90 capsule; Refill: 3      Follow up in about 3 months (around 9/4/2020).     D'Amico C Johnson, MD  6/4/2020  5:27 PM  [facility]   House Officer -II

## 2020-07-17 ENCOUNTER — HOSPITAL ENCOUNTER (OUTPATIENT)
Dept: RADIOLOGY | Facility: HOSPITAL | Age: 40
Discharge: HOME | End: 2020-07-17
Attending: PSYCHIATRY & NEUROLOGY
Payer: COMMERCIAL

## 2020-07-17 DIAGNOSIS — G40.209 COMPLEX PARTIAL SEIZURES WITH CONSCIOUSNESS IMPAIRED: ICD-10-CM

## 2020-07-17 PROCEDURE — A9585 GADOBUTROL INJECTION: HCPCS | Mod: PO | Performed by: PSYCHIATRY & NEUROLOGY

## 2020-07-17 PROCEDURE — 25500020 PHARM REV CODE 255: Mod: PO | Performed by: PSYCHIATRY & NEUROLOGY

## 2020-07-17 PROCEDURE — 70553 MRI BRAIN STEM W/O & W/DYE: CPT | Mod: TC,PO

## 2020-07-17 RX ORDER — GADOBUTROL 604.72 MG/ML
10 INJECTION INTRAVENOUS
Status: COMPLETED | OUTPATIENT
Start: 2020-07-17 | End: 2020-07-17

## 2020-07-17 RX ADMIN — GADOBUTROL 10 ML: 604.72 INJECTION INTRAVENOUS at 12:07

## 2021-01-14 ENCOUNTER — TELEPHONE (OUTPATIENT)
Dept: NEUROLOGY | Facility: CLINIC | Age: 41
End: 2021-01-14

## 2021-01-19 ENCOUNTER — OFFICE VISIT (OUTPATIENT)
Dept: NEUROLOGY | Facility: CLINIC | Age: 41
End: 2021-01-19
Payer: MEDICAID

## 2021-01-19 ENCOUNTER — LAB VISIT (OUTPATIENT)
Dept: LAB | Facility: HOSPITAL | Age: 41
End: 2021-01-19
Attending: PSYCHIATRY & NEUROLOGY
Payer: MEDICARE

## 2021-01-19 VITALS
HEIGHT: 69 IN | HEART RATE: 68 BPM | SYSTOLIC BLOOD PRESSURE: 102 MMHG | BODY MASS INDEX: 29.03 KG/M2 | WEIGHT: 196 LBS | DIASTOLIC BLOOD PRESSURE: 72 MMHG

## 2021-01-19 DIAGNOSIS — G40.209 PARTIAL SYMPTOMATIC EPILEPSY WITH COMPLEX PARTIAL SEIZURES, NOT INTRACTABLE, WITHOUT STATUS EPILEPTICUS: Primary | ICD-10-CM

## 2021-01-19 DIAGNOSIS — Z86.73 HISTORY OF STROKE IN ADULTHOOD: ICD-10-CM

## 2021-01-19 DIAGNOSIS — G40.209 PARTIAL SYMPTOMATIC EPILEPSY WITH COMPLEX PARTIAL SEIZURES, NOT INTRACTABLE, WITHOUT STATUS EPILEPTICUS: ICD-10-CM

## 2021-01-19 PROCEDURE — 99999 PR PBB SHADOW E&M-EST. PATIENT-LVL III: CPT | Mod: PBBFAC,,, | Performed by: PSYCHIATRY & NEUROLOGY

## 2021-01-19 PROCEDURE — 36415 COLL VENOUS BLD VENIPUNCTURE: CPT

## 2021-01-19 PROCEDURE — 99213 PR OFFICE/OUTPT VISIT, EST, LEVL III, 20-29 MIN: ICD-10-PCS | Mod: S$PBB,,, | Performed by: PSYCHIATRY & NEUROLOGY

## 2021-01-19 PROCEDURE — 99999 PR PBB SHADOW E&M-EST. PATIENT-LVL III: ICD-10-PCS | Mod: PBBFAC,,, | Performed by: PSYCHIATRY & NEUROLOGY

## 2021-01-19 PROCEDURE — 99213 OFFICE O/P EST LOW 20 MIN: CPT | Mod: PBBFAC,PN | Performed by: PSYCHIATRY & NEUROLOGY

## 2021-01-19 PROCEDURE — 99213 OFFICE O/P EST LOW 20 MIN: CPT | Mod: S$PBB,,, | Performed by: PSYCHIATRY & NEUROLOGY

## 2021-01-19 PROCEDURE — 80177 DRUG SCRN QUAN LEVETIRACETAM: CPT

## 2021-01-19 RX ORDER — LEVETIRACETAM 500 MG/1
1000 TABLET, EXTENDED RELEASE ORAL NIGHTLY
COMMUNITY
Start: 2020-12-23 | End: 2021-01-19 | Stop reason: SDUPTHER

## 2021-01-19 RX ORDER — METOPROLOL TARTRATE 50 MG/1
TABLET ORAL
COMMUNITY
Start: 2020-12-28 | End: 2021-05-12 | Stop reason: SDUPTHER

## 2021-01-19 RX ORDER — LEVETIRACETAM 500 MG/1
1000 TABLET, EXTENDED RELEASE ORAL NIGHTLY
Qty: 60 TABLET | Refills: 5 | Status: SHIPPED | OUTPATIENT
Start: 2021-01-19 | End: 2021-07-06 | Stop reason: SDUPTHER

## 2021-01-23 LAB — LEVETIRACETAM SERPL-MCNC: 8.1 UG/ML (ref 3–60)

## 2021-01-29 DIAGNOSIS — I69.320 CVA, OLD, APHASIA: ICD-10-CM

## 2021-02-02 RX ORDER — FOLIC ACID 1 MG/1
1 TABLET ORAL DAILY
Qty: 90 TABLET | Refills: 1 | Status: SHIPPED | OUTPATIENT
Start: 2021-02-02 | End: 2021-05-12

## 2021-03-09 ENCOUNTER — OFFICE VISIT (OUTPATIENT)
Dept: FAMILY MEDICINE | Facility: HOSPITAL | Age: 41
End: 2021-03-09
Attending: FAMILY MEDICINE
Payer: MEDICARE

## 2021-03-09 VITALS
SYSTOLIC BLOOD PRESSURE: 121 MMHG | DIASTOLIC BLOOD PRESSURE: 89 MMHG | HEIGHT: 69 IN | WEIGHT: 201.75 LBS | HEART RATE: 60 BPM | BODY MASS INDEX: 29.88 KG/M2

## 2021-03-09 DIAGNOSIS — M79.10 MYALGIA DUE TO STATIN: Primary | ICD-10-CM

## 2021-03-09 DIAGNOSIS — R73.03 PREDIABETES: ICD-10-CM

## 2021-03-09 DIAGNOSIS — I69.320 CVA, OLD, APHASIA: ICD-10-CM

## 2021-03-09 DIAGNOSIS — F17.200 TOBACCO USE DISORDER: ICD-10-CM

## 2021-03-09 DIAGNOSIS — T46.6X5A MYALGIA DUE TO STATIN: Primary | ICD-10-CM

## 2021-03-09 PROCEDURE — 99214 OFFICE O/P EST MOD 30 MIN: CPT | Performed by: STUDENT IN AN ORGANIZED HEALTH CARE EDUCATION/TRAINING PROGRAM

## 2021-03-24 ENCOUNTER — TELEPHONE (OUTPATIENT)
Dept: CARDIOLOGY | Facility: CLINIC | Age: 41
End: 2021-03-24

## 2021-03-25 ENCOUNTER — LAB VISIT (OUTPATIENT)
Dept: LAB | Facility: HOSPITAL | Age: 41
End: 2021-03-25
Attending: STUDENT IN AN ORGANIZED HEALTH CARE EDUCATION/TRAINING PROGRAM
Payer: MEDICARE

## 2021-03-25 DIAGNOSIS — M79.10 MYALGIA DUE TO STATIN: ICD-10-CM

## 2021-03-25 DIAGNOSIS — R73.03 PREDIABETES: ICD-10-CM

## 2021-03-25 DIAGNOSIS — T46.6X5A MYALGIA DUE TO STATIN: ICD-10-CM

## 2021-03-25 DIAGNOSIS — I69.320 CVA, OLD, APHASIA: ICD-10-CM

## 2021-03-25 LAB
25(OH)D3+25(OH)D2 SERPL-MCNC: 14 NG/ML (ref 30–96)
CHOLEST SERPL-MCNC: 101 MG/DL (ref 120–199)
CHOLEST/HDLC SERPL: 2.5 {RATIO} (ref 2–5)
CK SERPL-CCNC: 111 U/L (ref 55–170)
ESTIMATED AVG GLUCOSE: 114 MG/DL (ref 68–131)
HBA1C MFR BLD: 5.6 % (ref 4–5.6)
HDLC SERPL-MCNC: 41 MG/DL (ref 40–75)
HDLC SERPL: 40.6 % (ref 20–50)
LDLC SERPL CALC-MCNC: 37.4 MG/DL (ref 63–159)
NONHDLC SERPL-MCNC: 60 MG/DL
TRIGL SERPL-MCNC: 113 MG/DL (ref 30–150)

## 2021-03-25 PROCEDURE — 83036 HEMOGLOBIN GLYCOSYLATED A1C: CPT | Performed by: STUDENT IN AN ORGANIZED HEALTH CARE EDUCATION/TRAINING PROGRAM

## 2021-03-25 PROCEDURE — 80061 LIPID PANEL: CPT | Performed by: STUDENT IN AN ORGANIZED HEALTH CARE EDUCATION/TRAINING PROGRAM

## 2021-03-25 PROCEDURE — 36415 COLL VENOUS BLD VENIPUNCTURE: CPT | Mod: PO | Performed by: STUDENT IN AN ORGANIZED HEALTH CARE EDUCATION/TRAINING PROGRAM

## 2021-03-25 PROCEDURE — 82306 VITAMIN D 25 HYDROXY: CPT | Mod: PO | Performed by: STUDENT IN AN ORGANIZED HEALTH CARE EDUCATION/TRAINING PROGRAM

## 2021-03-25 PROCEDURE — 82550 ASSAY OF CK (CPK): CPT | Mod: PO | Performed by: STUDENT IN AN ORGANIZED HEALTH CARE EDUCATION/TRAINING PROGRAM

## 2021-03-30 ENCOUNTER — OFFICE VISIT (OUTPATIENT)
Dept: FAMILY MEDICINE | Facility: HOSPITAL | Age: 41
End: 2021-03-30
Attending: FAMILY MEDICINE
Payer: MEDICARE

## 2021-03-30 VITALS
WEIGHT: 201.94 LBS | BODY MASS INDEX: 29.91 KG/M2 | HEIGHT: 69 IN | DIASTOLIC BLOOD PRESSURE: 80 MMHG | HEART RATE: 74 BPM | SYSTOLIC BLOOD PRESSURE: 111 MMHG

## 2021-03-30 DIAGNOSIS — F17.200 TOBACCO USE DISORDER: ICD-10-CM

## 2021-03-30 DIAGNOSIS — E55.9 VITAMIN D INSUFFICIENCY: Primary | ICD-10-CM

## 2021-03-30 PROCEDURE — 99214 OFFICE O/P EST MOD 30 MIN: CPT | Performed by: STUDENT IN AN ORGANIZED HEALTH CARE EDUCATION/TRAINING PROGRAM

## 2021-03-30 RX ORDER — ACETAMINOPHEN 500 MG
5000 TABLET ORAL DAILY
Qty: 30 TABLET | Refills: 0 | Status: SHIPPED | OUTPATIENT
Start: 2021-03-30 | End: 2021-04-29

## 2021-05-06 DIAGNOSIS — I69.320 CVA, OLD, APHASIA: ICD-10-CM

## 2021-05-07 RX ORDER — CLOPIDOGREL BISULFATE 75 MG/1
75 TABLET ORAL DAILY
Qty: 90 TABLET | Refills: 1 | Status: SHIPPED | OUTPATIENT
Start: 2021-05-07 | End: 2021-07-06 | Stop reason: SDUPTHER

## 2021-05-07 RX ORDER — METOPROLOL SUCCINATE 50 MG/1
50 TABLET, EXTENDED RELEASE ORAL DAILY
Qty: 30 TABLET | Refills: 0 | OUTPATIENT
Start: 2021-05-07 | End: 2021-07-06 | Stop reason: SDUPTHER

## 2021-05-12 ENCOUNTER — TELEPHONE (OUTPATIENT)
Dept: CARDIOLOGY | Facility: CLINIC | Age: 41
End: 2021-05-12

## 2021-05-12 ENCOUNTER — OFFICE VISIT (OUTPATIENT)
Dept: CARDIOLOGY | Facility: CLINIC | Age: 41
End: 2021-05-12
Payer: MEDICARE

## 2021-05-12 VITALS
DIASTOLIC BLOOD PRESSURE: 80 MMHG | WEIGHT: 206.13 LBS | OXYGEN SATURATION: 98 % | HEIGHT: 69 IN | SYSTOLIC BLOOD PRESSURE: 128 MMHG | HEART RATE: 66 BPM | BODY MASS INDEX: 30.53 KG/M2

## 2021-05-12 DIAGNOSIS — R93.1 DECREASED CARDIAC EJECTION FRACTION: Primary | ICD-10-CM

## 2021-05-12 DIAGNOSIS — R47.01 BROCA'S APHASIA: ICD-10-CM

## 2021-05-12 DIAGNOSIS — Z86.718 HISTORY OF DVT OF LOWER EXTREMITY: ICD-10-CM

## 2021-05-12 DIAGNOSIS — I71.9 PSEUDOANEURYSM OF AORTA: ICD-10-CM

## 2021-05-12 DIAGNOSIS — R47.01 APHASIA: ICD-10-CM

## 2021-05-12 DIAGNOSIS — I50.22 CHRONIC SYSTOLIC CONGESTIVE HEART FAILURE: ICD-10-CM

## 2021-05-12 DIAGNOSIS — I10 ESSENTIAL HYPERTENSION: ICD-10-CM

## 2021-05-12 DIAGNOSIS — I69.320 CVA, OLD, APHASIA: ICD-10-CM

## 2021-05-12 DIAGNOSIS — F17.200 TOBACCO USE DISORDER: ICD-10-CM

## 2021-05-12 PROCEDURE — 99999 PR PBB SHADOW E&M-EST. PATIENT-LVL III: ICD-10-PCS | Mod: PBBFAC,,, | Performed by: INTERNAL MEDICINE

## 2021-05-12 PROCEDURE — 99215 OFFICE O/P EST HI 40 MIN: CPT | Mod: S$PBB,,, | Performed by: INTERNAL MEDICINE

## 2021-05-12 PROCEDURE — 99213 OFFICE O/P EST LOW 20 MIN: CPT | Mod: PBBFAC,PO | Performed by: INTERNAL MEDICINE

## 2021-05-12 PROCEDURE — 99999 PR PBB SHADOW E&M-EST. PATIENT-LVL III: CPT | Mod: PBBFAC,,, | Performed by: INTERNAL MEDICINE

## 2021-05-12 PROCEDURE — 99215 PR OFFICE/OUTPT VISIT, EST, LEVL V, 40-54 MIN: ICD-10-PCS | Mod: S$PBB,,, | Performed by: INTERNAL MEDICINE

## 2021-06-28 ENCOUNTER — HOSPITAL ENCOUNTER (EMERGENCY)
Facility: HOSPITAL | Age: 41
Discharge: HOME | End: 2021-06-29
Attending: EMERGENCY MEDICINE
Payer: MEDICARE

## 2021-06-28 DIAGNOSIS — G40.909 RECURRENT SEIZURES: Primary | ICD-10-CM

## 2021-06-28 DIAGNOSIS — R56.9 SEIZURE: ICD-10-CM

## 2021-06-28 PROCEDURE — 99283 EMERGENCY DEPT VISIT LOW MDM: CPT

## 2021-06-29 VITALS
OXYGEN SATURATION: 99 % | DIASTOLIC BLOOD PRESSURE: 82 MMHG | HEIGHT: 69 IN | RESPIRATION RATE: 18 BRPM | BODY MASS INDEX: 29.62 KG/M2 | TEMPERATURE: 98 F | WEIGHT: 200 LBS | HEART RATE: 70 BPM | SYSTOLIC BLOOD PRESSURE: 124 MMHG

## 2021-06-29 PROCEDURE — 93010 ELECTROCARDIOGRAM REPORT: CPT | Mod: ,,, | Performed by: INTERNAL MEDICINE

## 2021-06-29 PROCEDURE — 93005 ELECTROCARDIOGRAM TRACING: CPT

## 2021-06-29 PROCEDURE — 25000003 PHARM REV CODE 250: Performed by: EMERGENCY MEDICINE

## 2021-06-29 PROCEDURE — 93010 EKG 12-LEAD: ICD-10-PCS | Mod: ,,, | Performed by: INTERNAL MEDICINE

## 2021-06-29 RX ORDER — LEVETIRACETAM 500 MG/1
1000 TABLET ORAL
Status: COMPLETED | OUTPATIENT
Start: 2021-06-29 | End: 2021-06-29

## 2021-06-29 RX ADMIN — LEVETIRACETAM 1000 MG: 500 TABLET ORAL at 12:06

## 2021-07-01 DIAGNOSIS — I10 HYPERTENSION, UNSPECIFIED TYPE: ICD-10-CM

## 2021-07-01 RX ORDER — LOSARTAN POTASSIUM 50 MG/1
50 TABLET ORAL DAILY
Qty: 90 TABLET | Refills: 3 | Status: SHIPPED | OUTPATIENT
Start: 2021-07-01 | End: 2021-07-06 | Stop reason: SDUPTHER

## 2021-07-06 ENCOUNTER — OFFICE VISIT (OUTPATIENT)
Dept: FAMILY MEDICINE | Facility: HOSPITAL | Age: 41
End: 2021-07-06
Payer: MEDICARE

## 2021-07-06 VITALS
DIASTOLIC BLOOD PRESSURE: 74 MMHG | WEIGHT: 209 LBS | BODY MASS INDEX: 30.96 KG/M2 | HEIGHT: 69 IN | HEART RATE: 72 BPM | SYSTOLIC BLOOD PRESSURE: 107 MMHG

## 2021-07-06 DIAGNOSIS — E55.9 VITAMIN D INSUFFICIENCY: ICD-10-CM

## 2021-07-06 DIAGNOSIS — Z11.4 ENCOUNTER FOR SCREENING FOR HIV: ICD-10-CM

## 2021-07-06 DIAGNOSIS — G40.909 SEIZURE DISORDER: ICD-10-CM

## 2021-07-06 DIAGNOSIS — Z86.718 HISTORY OF DVT OF LOWER EXTREMITY: ICD-10-CM

## 2021-07-06 DIAGNOSIS — I69.320 CVA, OLD, APHASIA: ICD-10-CM

## 2021-07-06 DIAGNOSIS — Z11.59 NEED FOR HEPATITIS C SCREENING TEST: ICD-10-CM

## 2021-07-06 DIAGNOSIS — I50.22 CHRONIC SYSTOLIC CONGESTIVE HEART FAILURE: Primary | ICD-10-CM

## 2021-07-06 DIAGNOSIS — I10 HYPERTENSION, UNSPECIFIED TYPE: ICD-10-CM

## 2021-07-06 PROCEDURE — 99213 OFFICE O/P EST LOW 20 MIN: CPT | Performed by: STUDENT IN AN ORGANIZED HEALTH CARE EDUCATION/TRAINING PROGRAM

## 2021-07-06 RX ORDER — HYDROCHLOROTHIAZIDE 12.5 MG/1
12.5 CAPSULE ORAL DAILY
Qty: 90 CAPSULE | Refills: 3 | Status: SHIPPED | OUTPATIENT
Start: 2021-07-06 | End: 2021-09-24 | Stop reason: SDUPTHER

## 2021-07-06 RX ORDER — LEVETIRACETAM 500 MG/1
1000 TABLET, EXTENDED RELEASE ORAL NIGHTLY
Qty: 60 TABLET | Refills: 5 | Status: SHIPPED | OUTPATIENT
Start: 2021-07-06 | End: 2021-11-15 | Stop reason: SDUPTHER

## 2021-07-06 RX ORDER — ATORVASTATIN CALCIUM 80 MG/1
80 TABLET, FILM COATED ORAL DAILY
Qty: 90 TABLET | Refills: 3 | Status: SHIPPED | OUTPATIENT
Start: 2021-07-06 | End: 2022-01-06 | Stop reason: SDUPTHER

## 2021-07-06 RX ORDER — CLOPIDOGREL BISULFATE 75 MG/1
75 TABLET ORAL DAILY
Qty: 90 TABLET | Refills: 1 | Status: SHIPPED | OUTPATIENT
Start: 2021-07-06 | End: 2021-07-08 | Stop reason: SDUPTHER

## 2021-07-06 RX ORDER — LOSARTAN POTASSIUM 50 MG/1
50 TABLET ORAL DAILY
Qty: 90 TABLET | Refills: 3 | Status: SHIPPED | OUTPATIENT
Start: 2021-07-06 | End: 2022-05-06 | Stop reason: SDUPTHER

## 2021-07-06 RX ORDER — METOPROLOL SUCCINATE 50 MG/1
50 TABLET, EXTENDED RELEASE ORAL DAILY
Qty: 30 TABLET | Refills: 0 | Status: SHIPPED | OUTPATIENT
Start: 2021-07-06 | End: 2021-07-08 | Stop reason: SDUPTHER

## 2021-07-06 RX ORDER — ERGOCALCIFEROL 1.25 MG/1
50000 CAPSULE ORAL
Qty: 4 CAPSULE | Refills: 2 | Status: SHIPPED | OUTPATIENT
Start: 2021-07-06 | End: 2021-10-04

## 2021-07-07 ENCOUNTER — TELEPHONE (OUTPATIENT)
Dept: ADMINISTRATIVE | Facility: OTHER | Age: 41
End: 2021-07-07

## 2021-07-07 ENCOUNTER — LAB VISIT (OUTPATIENT)
Dept: LAB | Facility: HOSPITAL | Age: 41
End: 2021-07-07
Attending: STUDENT IN AN ORGANIZED HEALTH CARE EDUCATION/TRAINING PROGRAM
Payer: MEDICARE

## 2021-07-07 DIAGNOSIS — Z11.59 NEED FOR HEPATITIS C SCREENING TEST: ICD-10-CM

## 2021-07-07 DIAGNOSIS — Z11.4 ENCOUNTER FOR SCREENING FOR HIV: ICD-10-CM

## 2021-07-07 PROCEDURE — 86803 HEPATITIS C AB TEST: CPT | Mod: PO | Performed by: STUDENT IN AN ORGANIZED HEALTH CARE EDUCATION/TRAINING PROGRAM

## 2021-07-07 PROCEDURE — 36415 COLL VENOUS BLD VENIPUNCTURE: CPT | Mod: PO | Performed by: STUDENT IN AN ORGANIZED HEALTH CARE EDUCATION/TRAINING PROGRAM

## 2021-07-07 PROCEDURE — 87389 HIV-1 AG W/HIV-1&-2 AB AG IA: CPT | Mod: PO | Performed by: STUDENT IN AN ORGANIZED HEALTH CARE EDUCATION/TRAINING PROGRAM

## 2021-07-08 DIAGNOSIS — I69.320 CVA, OLD, APHASIA: ICD-10-CM

## 2021-07-08 DIAGNOSIS — I10 HYPERTENSION, UNSPECIFIED TYPE: ICD-10-CM

## 2021-07-08 DIAGNOSIS — I50.22 CHRONIC SYSTOLIC CONGESTIVE HEART FAILURE: ICD-10-CM

## 2021-07-08 LAB
HCV AB SERPL QL IA: NEGATIVE
HIV 1+2 AB+HIV1 P24 AG SERPL QL IA: NEGATIVE

## 2021-07-08 RX ORDER — METOPROLOL SUCCINATE 50 MG/1
50 TABLET, EXTENDED RELEASE ORAL DAILY
Qty: 30 TABLET | Refills: 0 | Status: SHIPPED | OUTPATIENT
Start: 2021-07-08 | End: 2021-07-20 | Stop reason: SDUPTHER

## 2021-07-08 RX ORDER — CLOPIDOGREL BISULFATE 75 MG/1
75 TABLET ORAL DAILY
Qty: 90 TABLET | Refills: 1 | Status: SHIPPED | OUTPATIENT
Start: 2021-07-08 | End: 2021-07-12 | Stop reason: SDUPTHER

## 2021-07-12 DIAGNOSIS — I69.320 CVA, OLD, APHASIA: ICD-10-CM

## 2021-07-12 DIAGNOSIS — I10 HYPERTENSION, UNSPECIFIED TYPE: ICD-10-CM

## 2021-07-12 DIAGNOSIS — I50.22 CHRONIC SYSTOLIC CONGESTIVE HEART FAILURE: ICD-10-CM

## 2021-07-14 ENCOUNTER — HOSPITAL ENCOUNTER (OUTPATIENT)
Dept: CARDIOLOGY | Facility: HOSPITAL | Age: 41
Discharge: HOME | End: 2021-07-14
Attending: STUDENT IN AN ORGANIZED HEALTH CARE EDUCATION/TRAINING PROGRAM
Payer: MEDICARE

## 2021-07-14 VITALS — BODY MASS INDEX: 30.81 KG/M2 | WEIGHT: 208 LBS | HEIGHT: 69 IN

## 2021-07-14 DIAGNOSIS — I50.22 CHRONIC SYSTOLIC CONGESTIVE HEART FAILURE: ICD-10-CM

## 2021-07-14 LAB
AORTIC ROOT ANNULUS: 3.01 CM
AORTIC VALVE CUSP SEPERATION: 1.94 CM
AV INDEX (PROSTH): 0.81
AV MEAN GRADIENT: 2 MMHG
AV PEAK GRADIENT: 3 MMHG
AV VALVE AREA: 3.09 CM2
AV VELOCITY RATIO: 0.94
BSA FOR ECHO PROCEDURE: 2.14 M2
CV ECHO LV RWT: 0.6 CM
DOP CALC AO PEAK VEL: 0.83 M/S
DOP CALC AO VTI: 18.31 CM
DOP CALC LVOT AREA: 3.8 CM2
DOP CALC LVOT DIAMETER: 2.21 CM
DOP CALC LVOT PEAK VEL: 0.78 M/S
DOP CALC LVOT STROKE VOLUME: 56.67 CM3
DOP CALCLVOT PEAK VEL VTI: 14.78 CM
E WAVE DECELERATION TIME: 179.2 MSEC
E/A RATIO: 1.1
E/E' RATIO: 5.42 M/S
ECHO LV POSTERIOR WALL: 1.04 CM (ref 0.6–1.1)
EJECTION FRACTION: 45 %
FRACTIONAL SHORTENING: 45 % (ref 28–44)
INTERVENTRICULAR SEPTUM: 1.08 CM (ref 0.6–1.1)
IVRT: 69.2 MSEC
LA MAJOR: 4.35 CM
LA MINOR: 3.93 CM
LA WIDTH: 3.22 CM
LEFT ATRIUM SIZE: 2.64 CM
LEFT ATRIUM VOLUME INDEX MOD: 7.3 ML/M2
LEFT ATRIUM VOLUME INDEX: 14.2 ML/M2
LEFT ATRIUM VOLUME MOD: 15.27 CM3
LEFT ATRIUM VOLUME: 29.84 CM3
LEFT INTERNAL DIMENSION IN SYSTOLE: 1.9 CM (ref 2.1–4)
LEFT VENTRICLE DIASTOLIC VOLUME INDEX: 23.91 ML/M2
LEFT VENTRICLE DIASTOLIC VOLUME: 50.21 ML
LEFT VENTRICLE MASS INDEX: 53 G/M2
LEFT VENTRICLE SYSTOLIC VOLUME INDEX: 9.3 ML/M2
LEFT VENTRICLE SYSTOLIC VOLUME: 19.63 ML
LEFT VENTRICULAR INTERNAL DIMENSION IN DIASTOLE: 3.48 CM (ref 3.5–6)
LEFT VENTRICULAR MASS: 111.65 G
LV LATERAL E/E' RATIO: 5 M/S
LV SEPTAL E/E' RATIO: 5.91 M/S
MV A" WAVE DURATION": 11.76 MSEC
MV MEAN GRADIENT: 1 MMHG
MV PEAK A VEL: 0.59 M/S
MV PEAK E VEL: 0.65 M/S
MV PEAK GRADIENT: 2 MMHG
MV STENOSIS PRESSURE HALF TIME: 51.97 MS
MV VALVE AREA P 1/2 METHOD: 4.23 CM2
PULM VEIN S/D RATIO: 0.67
PV PEAK D VEL: 0.46 M/S
PV PEAK S VEL: 0.31 M/S
PV PEAK VELOCITY: 0.85 CM/S
RA MAJOR: 3.83 CM
RA PRESSURE: 3 MMHG
RA WIDTH: 3.37 CM
RIGHT VENTRICULAR END-DIASTOLIC DIMENSION: 1.61 CM
RIGHT VENTRICULAR LENGTH IN DIASTOLE (APICAL 4-CHAMBER VIEW): 5.5 CM
RV TISSUE DOPPLER FREE WALL SYSTOLIC VELOCITY 1 (APICAL 4 CHAMBER VIEW): 7.98 CM/S
SINUS: 2.62 CM
TDI LATERAL: 0.13 M/S
TDI SEPTAL: 0.11 M/S
TDI: 0.12 M/S
TRICUSPID ANNULAR PLANE SYSTOLIC EXCURSION: 1.63 CM

## 2021-07-14 PROCEDURE — 93306 TTE W/DOPPLER COMPLETE: CPT | Mod: 26,,, | Performed by: INTERNAL MEDICINE

## 2021-07-14 PROCEDURE — 93306 ECHO (CUPID ONLY): ICD-10-PCS | Mod: 26,,, | Performed by: INTERNAL MEDICINE

## 2021-07-14 PROCEDURE — 93306 TTE W/DOPPLER COMPLETE: CPT | Mod: PO

## 2021-07-19 DIAGNOSIS — I50.22 CHRONIC SYSTOLIC CONGESTIVE HEART FAILURE: ICD-10-CM

## 2021-07-19 DIAGNOSIS — I10 HYPERTENSION, UNSPECIFIED TYPE: ICD-10-CM

## 2021-07-19 RX ORDER — METOPROLOL SUCCINATE 50 MG/1
50 TABLET, EXTENDED RELEASE ORAL DAILY
Qty: 30 TABLET | Refills: 0 | Status: CANCELLED | OUTPATIENT
Start: 2021-07-19 | End: 2021-08-18

## 2021-07-20 RX ORDER — METOPROLOL SUCCINATE 50 MG/1
50 TABLET, EXTENDED RELEASE ORAL DAILY
Qty: 30 TABLET | Refills: 0 | Status: SHIPPED | OUTPATIENT
Start: 2021-07-20 | End: 2021-08-19

## 2021-07-20 RX ORDER — CLOPIDOGREL BISULFATE 75 MG/1
75 TABLET ORAL DAILY
Qty: 90 TABLET | Refills: 1 | Status: SHIPPED | OUTPATIENT
Start: 2021-07-20 | End: 2022-05-06 | Stop reason: SDUPTHER

## 2021-09-24 DIAGNOSIS — I10 HYPERTENSION, UNSPECIFIED TYPE: ICD-10-CM

## 2021-09-24 RX ORDER — HYDROCHLOROTHIAZIDE 12.5 MG/1
12.5 CAPSULE ORAL DAILY
Qty: 90 CAPSULE | Refills: 3 | Status: SHIPPED | OUTPATIENT
Start: 2021-09-24 | End: 2022-05-06 | Stop reason: SDUPTHER

## 2021-10-12 DIAGNOSIS — I50.22 CHRONIC SYSTOLIC CONGESTIVE HEART FAILURE: ICD-10-CM

## 2021-10-12 DIAGNOSIS — I10 HYPERTENSION, UNSPECIFIED TYPE: ICD-10-CM

## 2021-10-12 RX ORDER — METOPROLOL SUCCINATE 50 MG/1
50 TABLET, EXTENDED RELEASE ORAL DAILY
Qty: 30 TABLET | Refills: 3 | Status: SHIPPED | OUTPATIENT
Start: 2021-10-12 | End: 2022-01-27 | Stop reason: SDUPTHER

## 2021-11-15 DIAGNOSIS — G40.909 SEIZURE DISORDER: ICD-10-CM

## 2021-11-17 RX ORDER — LEVETIRACETAM 500 MG/1
1000 TABLET, EXTENDED RELEASE ORAL NIGHTLY
Qty: 60 TABLET | Refills: 5 | Status: SHIPPED | OUTPATIENT
Start: 2021-11-17 | End: 2022-05-06 | Stop reason: SDUPTHER

## 2022-01-06 DIAGNOSIS — I69.320 CVA, OLD, APHASIA: ICD-10-CM

## 2022-01-12 RX ORDER — ATORVASTATIN CALCIUM 80 MG/1
80 TABLET, FILM COATED ORAL DAILY
Qty: 90 TABLET | Refills: 3 | Status: SHIPPED | OUTPATIENT
Start: 2022-01-12 | End: 2023-01-26 | Stop reason: SDUPTHER

## 2022-01-18 ENCOUNTER — HOSPITAL ENCOUNTER (EMERGENCY)
Facility: HOSPITAL | Age: 42
Discharge: HOME | End: 2022-01-18
Attending: EMERGENCY MEDICINE
Payer: MEDICARE

## 2022-01-18 VITALS
HEIGHT: 69 IN | TEMPERATURE: 100 F | BODY MASS INDEX: 29.62 KG/M2 | HEART RATE: 84 BPM | SYSTOLIC BLOOD PRESSURE: 121 MMHG | DIASTOLIC BLOOD PRESSURE: 76 MMHG | OXYGEN SATURATION: 99 % | RESPIRATION RATE: 18 BRPM | WEIGHT: 200 LBS

## 2022-01-18 DIAGNOSIS — J06.9 VIRAL URI: Primary | ICD-10-CM

## 2022-01-18 DIAGNOSIS — Z20.822 ENCOUNTER FOR LABORATORY TESTING FOR COVID-19 VIRUS: ICD-10-CM

## 2022-01-18 PROCEDURE — 99282 EMERGENCY DEPT VISIT SF MDM: CPT

## 2022-01-18 PROCEDURE — U0003 INFECTIOUS AGENT DETECTION BY NUCLEIC ACID (DNA OR RNA); SEVERE ACUTE RESPIRATORY SYNDROME CORONAVIRUS 2 (SARS-COV-2) (CORONAVIRUS DISEASE [COVID-19]), AMPLIFIED PROBE TECHNIQUE, MAKING USE OF HIGH THROUGHPUT TECHNOLOGIES AS DESCRIBED BY CMS-2020-01-R: HCPCS | Performed by: NURSE PRACTITIONER

## 2022-01-18 PROCEDURE — U0005 INFEC AGEN DETEC AMPLI PROBE: HCPCS | Performed by: NURSE PRACTITIONER

## 2022-01-18 NOTE — DISCHARGE INSTRUCTIONS
Tylenol or motrin as needed for fever  Keep hydrated with powerade, gatorade or pedialyte  No school/work until fever free for 24 hours without medication  Quarantine until you receive your results in 48-72 hours  Clean all door handles and common counter services

## 2022-01-18 NOTE — ED PROVIDER NOTES
Encounter Date: 1/18/2022       History     Chief Complaint   Patient presents with    Fever     Low grade fever 2 days that resolved with Tylenol. Today the patient feels like he needs to cough something up.      41-year-old male with history of DVT, hypertension and stroke which presents with subjective fever and sore throat that began yesterday.  Patient concerned that he has COVID like to get tested.    The history is provided by the patient.     Review of patient's allergies indicates:  No Known Allergies  Past Medical History:   Diagnosis Date    DVT (deep venous thrombosis)     High cholesterol     Hypertension     Seizures     Stroke      Past Surgical History:   Procedure Laterality Date    ABDOMINAL SURGERY      AORTIC ARCH REPAIR      AORTIC VALVE SURGERY      DIAPHRAGM SURGERY       Family History   Problem Relation Age of Onset    Hypertension Mother     Diabetes Mother     Cancer Father      Social History     Tobacco Use    Smoking status: Current Some Day Smoker     Packs/day: 0.50     Types: Cigarettes    Smokeless tobacco: Never Used   Substance Use Topics    Alcohol use: Yes     Alcohol/week: 2.0 standard drinks     Types: 2 Cans of beer per week    Drug use: No     Review of Systems   Constitutional: Positive for fever. Unexpected weight change: Subjective has been taking Tylenol.   HENT: Positive for sore throat.    Respiratory: Negative for shortness of breath.    Cardiovascular: Negative for chest pain.   Gastrointestinal: Negative for nausea.   Genitourinary: Negative for dysuria.   Musculoskeletal: Negative for back pain.   Skin: Negative for rash.   Neurological: Negative for weakness.   Hematological: Does not bruise/bleed easily.   All other systems reviewed and are negative.      Physical Exam     Initial Vitals [01/18/22 1110]   BP Pulse Resp Temp SpO2   121/76 84 18 99.6 °F (37.6 °C) 99 %      MAP       --         Physical Exam    Nursing note and vitals  reviewed.  Constitutional: He appears well-developed and well-nourished.   HENT:   Head: Normocephalic and atraumatic.   Eyes: Conjunctivae and EOM are normal. Pupils are equal, round, and reactive to light.   Neck:   Normal range of motion.  Cardiovascular: Normal rate, regular rhythm, normal heart sounds and intact distal pulses. Exam reveals no gallop and no friction rub.    No murmur heard.  Pulmonary/Chest: Breath sounds normal. No respiratory distress. He has no wheezes. He has no rhonchi. He has no rales. He exhibits no tenderness.   Musculoskeletal:         General: No tenderness or edema. Normal range of motion.      Cervical back: Normal range of motion.     Neurological: He is alert and oriented to person, place, and time. He has normal strength. GCS score is 15. GCS eye subscore is 4. GCS verbal subscore is 5. GCS motor subscore is 6.   Skin: Skin is warm. Capillary refill takes less than 2 seconds.       ED Course   Procedures  Labs Reviewed   SARS-COV-2 (COVID-19) QUALITATIVE PCR          Imaging Results    None          Medications - No data to display  Medical Decision Making:   Initial Assessment:   41-year-old male with presents the emergency room for COVID testing after having a fever yesterday and sore throat.  Routine COVID ordered.   Differential Diagnosis:   COVID positive, COVID negative, viral uri    Clinical Tests:   Lab Tests: Ordered  ED Management:  Pt examined and does NOT have any respiratory distress. COVID pending. Patient given strict return precautions and voiced understanding of all discharge instructions. Pt was stable at discharge.                ED Course as of 01/18/22 1137   Tue Jan 18, 2022   1117 BP: 121/76 [AT]   1117 Temp: 99.6 °F (37.6 °C) [AT]   1117 Temp src: Oral [AT]   1117 Pulse: 84 [AT]   1117 Resp: 18 [AT]   1117 SpO2: 99 % [AT]      ED Course User Index  [AT] NIDHI Gates             Clinical Impression:   Final diagnoses:  [J06.9] Viral URI  (Primary)  [Z20.822] Encounter for laboratory testing for COVID-19 virus          ED Disposition Condition    Discharge Stable        ED Prescriptions     None        Follow-up Information     Follow up With Specialties Details Why Contact Info    D'Amico C. Johnson, MD Family Medicine Schedule an appointment as soon as possible for a visit  As needed [address]  [address]  [facility] [address]  [phone]             Evelia Aleman, [facility]  01/18/22 1139

## 2022-01-18 NOTE — ED NOTES
"Present to ED with low grade fever after exposure to Covid + family member. Denies SOB,  States, "Feels like something is in my throat." No distress.   "

## 2022-01-19 DIAGNOSIS — U07.1 COVID-19 VIRUS DETECTED: ICD-10-CM

## 2022-01-19 LAB
SARS-COV-2 RNA RESP QL NAA+PROBE: DETECTED
SARS-COV-2- CYCLE NUMBER: 14

## 2022-01-27 DIAGNOSIS — I50.22 CHRONIC SYSTOLIC CONGESTIVE HEART FAILURE: ICD-10-CM

## 2022-01-27 DIAGNOSIS — I10 HYPERTENSION, UNSPECIFIED TYPE: ICD-10-CM

## 2022-01-27 RX ORDER — METOPROLOL SUCCINATE 50 MG/1
50 TABLET, EXTENDED RELEASE ORAL DAILY
Qty: 30 TABLET | Refills: 3 | Status: SHIPPED | OUTPATIENT
Start: 2022-01-27 | End: 2022-05-06 | Stop reason: SDUPTHER

## 2022-02-19 ENCOUNTER — HOSPITAL ENCOUNTER (EMERGENCY)
Facility: HOSPITAL | Age: 42
Discharge: HOME | End: 2022-02-20
Attending: FAMILY MEDICINE
Payer: MEDICARE

## 2022-02-19 DIAGNOSIS — M54.32 SCIATICA OF LEFT SIDE: Primary | ICD-10-CM

## 2022-02-19 DIAGNOSIS — R04.2 HEMOPTYSIS: ICD-10-CM

## 2022-02-19 LAB
ALBUMIN SERPL BCP-MCNC: 4.3 G/DL (ref 3.5–5.2)
ALP SERPL-CCNC: 84 U/L (ref 38–126)
ALT SERPL W/O P-5'-P-CCNC: 53 U/L (ref 10–44)
ANION GAP SERPL CALC-SCNC: 10 MMOL/L (ref 8–16)
APTT BLDCRRT: 23.7 SEC (ref 21–32)
AST SERPL-CCNC: 38 U/L (ref 15–46)
BASOPHILS # BLD AUTO: 0.04 K/UL (ref 0–0.2)
BASOPHILS NFR BLD: 0.7 % (ref 0–1.9)
BILIRUB SERPL-MCNC: 0.6 MG/DL (ref 0.1–1)
CALCIUM SERPL-MCNC: 9.1 MG/DL (ref 8.7–10.5)
CHLORIDE SERPL-SCNC: 103 MMOL/L (ref 95–110)
CO2 SERPL-SCNC: 28 MMOL/L (ref 23–29)
CREAT SERPL-MCNC: 0.92 MG/DL (ref 0.5–1.4)
DIFFERENTIAL METHOD: ABNORMAL
EOSINOPHIL # BLD AUTO: 0.2 K/UL (ref 0–0.5)
EOSINOPHIL NFR BLD: 2.8 % (ref 0–8)
ERYTHROCYTE [DISTWIDTH] IN BLOOD BY AUTOMATED COUNT: 13 % (ref 11.5–14.5)
EST. GFR  (AFRICAN AMERICAN): >60 ML/MIN/1.73 M^2
EST. GFR  (NON AFRICAN AMERICAN): >60 ML/MIN/1.73 M^2
GLUCOSE SERPL-MCNC: 96 MG/DL (ref 70–110)
HCT VFR BLD AUTO: 40.6 % (ref 40–54)
HGB BLD-MCNC: 14.2 G/DL (ref 14–18)
IMM GRANULOCYTES # BLD AUTO: 0.01 K/UL (ref 0–0.04)
IMM GRANULOCYTES NFR BLD AUTO: 0.2 % (ref 0–0.5)
INR PPP: 1 (ref 0.8–1.2)
LYMPHOCYTES # BLD AUTO: 2.4 K/UL (ref 1–4.8)
LYMPHOCYTES NFR BLD: 44.1 % (ref 18–48)
MCH RBC QN AUTO: 32.6 PG (ref 27–31)
MCHC RBC AUTO-ENTMCNC: 35 G/DL (ref 32–36)
MCV RBC AUTO: 93 FL (ref 82–98)
MONOCYTES # BLD AUTO: 0.6 K/UL (ref 0.3–1)
MONOCYTES NFR BLD: 10.8 % (ref 4–15)
NEUTROPHILS # BLD AUTO: 2.2 K/UL (ref 1.8–7.7)
NEUTROPHILS NFR BLD: 41.4 % (ref 38–73)
NRBC BLD-RTO: 0 /100 WBC
PLATELET # BLD AUTO: 145 K/UL (ref 150–450)
PMV BLD AUTO: 10.2 FL (ref 9.2–12.9)
POTASSIUM SERPL-SCNC: 3.5 MMOL/L (ref 3.5–5.1)
PROT SERPL-MCNC: 7.3 G/DL (ref 6–8.4)
PROTHROMBIN TIME: 10.4 SEC (ref 9–12.5)
RBC # BLD AUTO: 4.35 M/UL (ref 4.6–6.2)
SODIUM SERPL-SCNC: 141 MMOL/L (ref 136–145)
UUN UR-MCNC: 13 MG/DL (ref 2–20)
WBC # BLD AUTO: 5.38 K/UL (ref 3.9–12.7)

## 2022-02-19 PROCEDURE — 93010 ELECTROCARDIOGRAM REPORT: CPT | Mod: ,,, | Performed by: INTERNAL MEDICINE

## 2022-02-19 PROCEDURE — 80053 COMPREHEN METABOLIC PANEL: CPT | Mod: ER | Performed by: FAMILY MEDICINE

## 2022-02-19 PROCEDURE — 85025 COMPLETE CBC W/AUTO DIFF WBC: CPT | Mod: ER | Performed by: FAMILY MEDICINE

## 2022-02-19 PROCEDURE — 93005 ELECTROCARDIOGRAM TRACING: CPT | Mod: ER

## 2022-02-19 PROCEDURE — 99285 EMERGENCY DEPT VISIT HI MDM: CPT | Mod: 25,ER

## 2022-02-19 PROCEDURE — 85610 PROTHROMBIN TIME: CPT | Mod: ER | Performed by: FAMILY MEDICINE

## 2022-02-19 PROCEDURE — 93010 EKG 12-LEAD: ICD-10-PCS | Mod: ,,, | Performed by: INTERNAL MEDICINE

## 2022-02-19 PROCEDURE — 85730 THROMBOPLASTIN TIME PARTIAL: CPT | Mod: ER | Performed by: FAMILY MEDICINE

## 2022-02-20 VITALS
HEART RATE: 67 BPM | RESPIRATION RATE: 20 BRPM | DIASTOLIC BLOOD PRESSURE: 67 MMHG | OXYGEN SATURATION: 97 % | TEMPERATURE: 98 F | SYSTOLIC BLOOD PRESSURE: 119 MMHG | BODY MASS INDEX: 29.53 KG/M2 | WEIGHT: 200 LBS

## 2022-02-20 PROCEDURE — 25000003 PHARM REV CODE 250: Mod: ER | Performed by: FAMILY MEDICINE

## 2022-02-20 PROCEDURE — 25500020 PHARM REV CODE 255: Mod: ER | Performed by: FAMILY MEDICINE

## 2022-02-20 RX ORDER — TRAMADOL HYDROCHLORIDE 50 MG/1
50 TABLET ORAL
Status: COMPLETED | OUTPATIENT
Start: 2022-02-20 | End: 2022-02-20

## 2022-02-20 RX ADMIN — TRAMADOL HYDROCHLORIDE 50 MG: 50 TABLET, FILM COATED ORAL at 01:02

## 2022-02-20 RX ADMIN — IOHEXOL 100 ML: 350 INJECTION, SOLUTION INTRAVENOUS at 12:02
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
## 2022-05-06 ENCOUNTER — OFFICE VISIT (OUTPATIENT)
Dept: FAMILY MEDICINE | Facility: HOSPITAL | Age: 42
End: 2022-05-06
Payer: MEDICARE

## 2022-05-06 VITALS
BODY MASS INDEX: 32.09 KG/M2 | WEIGHT: 216.69 LBS | DIASTOLIC BLOOD PRESSURE: 78 MMHG | SYSTOLIC BLOOD PRESSURE: 115 MMHG | HEIGHT: 69 IN | HEART RATE: 54 BPM

## 2022-05-06 DIAGNOSIS — G40.909 SEIZURE DISORDER: ICD-10-CM

## 2022-05-06 DIAGNOSIS — I10 HYPERTENSION, UNSPECIFIED TYPE: ICD-10-CM

## 2022-05-06 DIAGNOSIS — I50.22 CHRONIC SYSTOLIC CONGESTIVE HEART FAILURE: ICD-10-CM

## 2022-05-06 DIAGNOSIS — I69.320 CVA, OLD, APHASIA: ICD-10-CM

## 2022-05-06 PROCEDURE — 99213 OFFICE O/P EST LOW 20 MIN: CPT | Performed by: STUDENT IN AN ORGANIZED HEALTH CARE EDUCATION/TRAINING PROGRAM

## 2022-05-06 RX ORDER — LOSARTAN POTASSIUM 50 MG/1
50 TABLET ORAL DAILY
Qty: 90 TABLET | Refills: 3 | Status: SHIPPED | OUTPATIENT
Start: 2022-05-06 | End: 2023-04-25 | Stop reason: SDUPTHER

## 2022-05-06 RX ORDER — HYDROCHLOROTHIAZIDE 12.5 MG/1
12.5 CAPSULE ORAL DAILY
Qty: 90 CAPSULE | Refills: 3 | Status: SHIPPED | OUTPATIENT
Start: 2022-05-06 | End: 2022-10-28 | Stop reason: SDUPTHER

## 2022-05-06 RX ORDER — METOPROLOL SUCCINATE 50 MG/1
50 TABLET, EXTENDED RELEASE ORAL DAILY
Qty: 90 TABLET | Refills: 3 | Status: SHIPPED | OUTPATIENT
Start: 2022-05-06 | End: 2022-06-24 | Stop reason: SDUPTHER

## 2022-05-06 RX ORDER — LEVETIRACETAM 500 MG/1
1000 TABLET, EXTENDED RELEASE ORAL NIGHTLY
Qty: 180 TABLET | Refills: 3 | Status: SHIPPED | OUTPATIENT
Start: 2022-05-06 | End: 2022-06-24 | Stop reason: SDUPTHER

## 2022-05-06 RX ORDER — ASPIRIN 81 MG/1
81 TABLET ORAL DAILY
Qty: 90 TABLET | Refills: 3 | Status: SHIPPED | OUTPATIENT
Start: 2022-05-06 | End: 2023-04-25 | Stop reason: SDUPTHER

## 2022-05-06 RX ORDER — CLOPIDOGREL BISULFATE 75 MG/1
75 TABLET ORAL DAILY
Qty: 90 TABLET | Refills: 1 | Status: SHIPPED | OUTPATIENT
Start: 2022-05-06 | End: 2022-09-22 | Stop reason: SDUPTHER

## 2022-05-06 NOTE — PROGRESS NOTES
[facility]               Clinic H&P    Subjective                                                                                                                                                                           Chief Complaint:  Refills     Ramohn Migel Bourne is a 41 y.o. male who  has a past medical history of DVT (deep venous thrombosis), High cholesterol, Hypertension, Seizures, and Stroke. The patient presents to clinic for   HPI     HTN  -Losartan 100mg qd, HCTZ 12.5 mg, toprol 500mg qd       HFrEF  -Follows with Cards  -echo 2021: EF 45% concentric remodeling and mildly decreased SF  -Stress test 2018 global hypokinesis with a low stress ejection fraction and fixed inferior wall perfusion defect  -On losartan 100 qd, metoprolol succinate 50mg qd  - no follow up appointment with cardiology at this time. 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 Denies any withdrawal doing well    Health Maintenance   Topic Date Due    TETANUS VACCINE  Never done    High Dose Statin  05/06/2023    Lipid Panel  03/25/2026    Hepatitis C Screening  Completed        Review of Systems   Constitutional: Negative for activity change, chills, fatigue and fever.   HENT: Negative for congestion and ear pain.    Eyes: Negative for pain.   Respiratory: Negative for apnea, chest tightness, shortness of breath and wheezing.    Cardiovascular: Negative for chest pain and palpitations.   Gastrointestinal: Negative for abdominal pain, constipation, diarrhea and nausea.   Endocrine: Negative for polydipsia.   Genitourinary: Negative for flank  "pain.   Musculoskeletal: Negative for arthralgias, back pain, neck pain and neck stiffness.   Skin: Negative for rash.   Neurological: Negative for dizziness, tremors, facial asymmetry, speech difficulty and light-headedness.   Psychiatric/Behavioral: Negative for agitation.        Objective                                                                                                                                                                             Vitals:    05/06/22 0946   BP: 115/78   Pulse: (!) 54   Weight: 98.3 kg (216 lb 11.4 oz)   Height: 5' 9" (1.753 m)      Body mass index is 32 kg/m².    Physical Exam  Vitals and nursing note reviewed.   Constitutional:       Appearance: Normal appearance.   HENT:      Head: Normocephalic and atraumatic.      Nose: Nose normal.      Mouth/Throat:      Mouth: Mucous membranes are moist.   Eyes:      Extraocular Movements: Extraocular movements intact.      Pupils: Pupils are equal, round, and reactive to light.   Cardiovascular:      Rate and Rhythm: Normal rate and regular rhythm.      Pulses: Normal pulses.      Heart sounds: No murmur heard.    No friction rub.   Pulmonary:      Effort: Pulmonary effort is normal.      Breath sounds: Normal breath sounds. No wheezing.   Chest:      Chest wall: No tenderness.   Abdominal:      General: Abdomen is flat.      Tenderness: There is no abdominal tenderness. There is no right CVA tenderness or left CVA tenderness.   Musculoskeletal:         General: No swelling. Normal range of motion.      Right lower leg: No edema.      Left lower leg: No edema.   Skin:     General: Skin is warm.      Findings: No erythema.   Neurological:      General: No focal deficit present.      Mental Status: He is alert and oriented to person, place, and time.      Motor: No weakness.      Coordination: Coordination normal.   Psychiatric:         Mood and Affect: Mood normal.         Behavior: Behavior normal.         Laboratory:  Lab " Results   Component Value Date    WBC 5.38 02/19/2022    HGB 14.2 02/19/2022    HCT 40.6 02/19/2022    MCV 93 02/19/2022     (L) 02/19/2022       Chemistry        Component Value Date/Time     02/19/2022 2336    K 3.5 02/19/2022 2336     02/19/2022 2336    CO2 28 02/19/2022 2336    BUN 13 02/19/2022 2336    CREATININE 0.92 02/19/2022 2336    GLU 96 02/19/2022 2336        Component Value Date/Time    CALCIUM 9.1 02/19/2022 2336    ALKPHOS 84 02/19/2022 2336    AST 38 02/19/2022 2336    ALT 53 (H) 02/19/2022 2336    BILITOT 0.6 02/19/2022 2336    ESTGFRAFRICA >60.0 02/19/2022 2336    EGFRNONAA >60.0 02/19/2022 2336          Lab Results   Component Value Date    MG 1.7 05/24/2020     Lab Results   Component Value Date    CHOL 101 (L) 03/25/2021    HDL 41 03/25/2021    LDLCALC 37.4 (L) 03/25/2021    TRIG 113 03/25/2021     The ASCVD Risk score (Mignon MASOUD Jr., et al., 2013) failed to calculate for the following reasons:    The 2013 ASCVD risk score is only valid for ages 40 to 79       Lab Results   Component Value Date    LDLCALC 37.4 (L) 03/25/2021       No results found for: TSH  Lab Results   Component Value Date    HGBA1C 5.6 03/25/2021         Assessment/Plan                                                                                                                                                                Noy Bourne is a 41 y.o. male who presents to clinic with:    1. Hypertension, unspecified type    2. CVA, old, aphasia    3. Seizure disorder    4. Chronic systolic congestive heart failure           Problem List Items Addressed This Visit        Neuro    CVA, old, aphasia    Relevant Medications    aspirin (ECOTRIN) 81 MG EC tablet    clopidogreL (PLAVIX) 75 mg tablet       Cardiac/Vascular    Chronic systolic congestive heart failure    Relevant Medications    metoprolol succinate (TOPROL-XL) 50 MG 24 hr tablet      Other Visit Diagnoses     Hypertension, unspecified type         Relevant Medications    aspirin (ECOTRIN) 81 MG EC tablet    losartan (COZAAR) 50 MG tablet    hydroCHLOROthiazide (MICROZIDE) 12.5 mg capsule    metoprolol succinate (TOPROL-XL) 50 MG 24 hr tablet    Seizure disorder        Relevant Medications    levetiracetam XR (KEPPRA XR) 500 mg Tb24 24 hr tablet        Encouraged patient to ensure follow up with cardiology 2/2 to needing CTA  Patient verbalized understanding    Medication List with Changes/Refills   Current Medications    ATORVASTATIN (LIPITOR) 80 MG TABLET    Take 1 tablet (80 mg total) by mouth once daily.   Changed and/or Refilled Medications    Modified Medication Previous Medication    ASPIRIN (ECOTRIN) 81 MG EC TABLET aspirin (ECOTRIN) 81 MG EC tablet       Take 1 tablet (81 mg total) by mouth once daily.    Take 1 tablet (81 mg total) by mouth once daily.    CLOPIDOGREL (PLAVIX) 75 MG TABLET clopidogreL (PLAVIX) 75 mg tablet       Take 1 tablet (75 mg total) by mouth once daily.    Take 1 tablet (75 mg total) by mouth once daily.    HYDROCHLOROTHIAZIDE (MICROZIDE) 12.5 MG CAPSULE hydroCHLOROthiazide (MICROZIDE) 12.5 mg capsule       Take 1 capsule (12.5 mg total) by mouth once daily.    Take 1 capsule (12.5 mg total) by mouth once daily.    LEVETIRACETAM XR (KEPPRA XR) 500 MG TB24 24 HR TABLET levetiracetam XR (KEPPRA XR) 500 mg Tb24 24 hr tablet       Take 2 tablets (1,000 mg total) by mouth nightly.    Take 2 tablets (1,000 mg total) by mouth nightly.    LOSARTAN (COZAAR) 50 MG TABLET losartan (COZAAR) 50 MG tablet       Take 1 tablet (50 mg total) by mouth once daily.    Take 1 tablet (50 mg total) by mouth once daily.    METOPROLOL SUCCINATE (TOPROL-XL) 50 MG 24 HR TABLET metoprolol succinate (TOPROL-XL) 50 MG 24 hr tablet       Take 1 tablet (50 mg total) by mouth once daily.    Take 1 tablet (50 mg total) by mouth once daily.       Patient counseled about the importance of healthy dietary habits as well as routine physical activity and exercise  for better health outcomes.    The patient's diagnosis, medications, and proper use of medications were dicussed. The importance of close follow up to discuss labs, modify medications, and monitor any potential side effects was also discussed. The patient was also informed of the importance of any future cancer screening.     No follow-ups on file.     Patient expressed understanding after counseling regarding diagnosis and recommendations.  Case discussed with staff, Dr. Ky Ayala MD  [facility] Family Medicine HO-2

## 2022-05-27 ENCOUNTER — TELEPHONE (OUTPATIENT)
Dept: CARDIOLOGY | Facility: CLINIC | Age: 42
End: 2022-05-27
Payer: MEDICARE

## 2022-05-27 NOTE — TELEPHONE ENCOUNTER
Pt is going to be scheduled for July.   ----- Message from Nandini Schuler sent at 5/27/2022  8:14 AM CDT -----  Regarding: Call back  Contact: [phone]  Type:  Sooner Apoointment Request    Caller is requesting a sooner appointment.  Caller declined first available appointment listed below.  Caller will not accept being placed on the waitlist and is requesting a message be sent to doctor.  Name of Caller: PT   When is the first available appointment? October   Symptoms: 1 year follow up   Would the patient rather a call back or a response via [facility]? Call back   Best Call Back Number: [phone]  Additional Information:

## 2022-06-07 DIAGNOSIS — I10 HYPERTENSION, UNSPECIFIED TYPE: ICD-10-CM

## 2022-06-07 DIAGNOSIS — I50.22 CHRONIC SYSTOLIC CONGESTIVE HEART FAILURE: ICD-10-CM

## 2022-06-08 RX ORDER — METOPROLOL SUCCINATE 50 MG/1
50 TABLET, EXTENDED RELEASE ORAL DAILY
Qty: 90 TABLET | Refills: 3 | OUTPATIENT
Start: 2022-06-08 | End: 2023-06-08

## 2022-06-24 DIAGNOSIS — G40.909 SEIZURE DISORDER: ICD-10-CM

## 2022-06-24 DIAGNOSIS — I10 HYPERTENSION, UNSPECIFIED TYPE: ICD-10-CM

## 2022-06-24 DIAGNOSIS — I50.22 CHRONIC SYSTOLIC CONGESTIVE HEART FAILURE: ICD-10-CM

## 2022-06-24 RX ORDER — METOPROLOL SUCCINATE 50 MG/1
50 TABLET, EXTENDED RELEASE ORAL DAILY
Qty: 90 TABLET | Refills: 3 | Status: SHIPPED | OUTPATIENT
Start: 2022-06-24 | End: 2022-06-24 | Stop reason: SDUPTHER

## 2022-06-24 NOTE — TELEPHONE ENCOUNTER
----- Message from Sydni Castro sent at 6/24/2022 10:35 AM CDT -----  Regarding: Med refill  Pt called in regards to med refill of metoprolol succinate (TOPROL-XL) 50 MG 24 hr tablet. Pt was told by me that it was sent and received by pharmacy on 5/6/2022. Pt states the pharmacy is stating that they never received it all. They need the order sent again.

## 2022-06-24 NOTE — TELEPHONE ENCOUNTER
----- Message from Sydni Castro sent at 6/24/2022 11:09 AM CDT -----  Regarding: Med Refill  The Pharmacy called regarding the pt meds. They state another order needs to be sent of the meds levetiracetam XR (KEPPRA XR) 500 mg Tb24 24 hr tablet and metoprolol succinate (TOPROL-XL) 50 MG 24 hr tablet.       Pharmacy [facility] [place] [facility] [facility]

## 2022-06-27 RX ORDER — METOPROLOL SUCCINATE 50 MG/1
50 TABLET, EXTENDED RELEASE ORAL DAILY
Qty: 90 TABLET | Refills: 3 | Status: SHIPPED | OUTPATIENT
Start: 2022-06-27 | End: 2023-04-25 | Stop reason: SDUPTHER

## 2022-06-27 RX ORDER — LEVETIRACETAM 500 MG/1
1000 TABLET, EXTENDED RELEASE ORAL NIGHTLY
Qty: 180 TABLET | Refills: 3 | Status: SHIPPED | OUTPATIENT
Start: 2022-06-27 | End: 2023-04-25 | Stop reason: SDUPTHER

## 2022-07-27 ENCOUNTER — OFFICE VISIT (OUTPATIENT)
Dept: CARDIOLOGY | Facility: CLINIC | Age: 42
End: 2022-07-27
Payer: MEDICARE

## 2022-07-27 VITALS
HEART RATE: 78 BPM | SYSTOLIC BLOOD PRESSURE: 104 MMHG | HEIGHT: 69 IN | WEIGHT: 214.94 LBS | DIASTOLIC BLOOD PRESSURE: 86 MMHG | BODY MASS INDEX: 31.84 KG/M2 | OXYGEN SATURATION: 96 %

## 2022-07-27 DIAGNOSIS — I69.320 CVA, OLD, APHASIA: Primary | ICD-10-CM

## 2022-07-27 DIAGNOSIS — R93.1 DECREASED CARDIAC EJECTION FRACTION: ICD-10-CM

## 2022-07-27 DIAGNOSIS — I50.22 CHRONIC SYSTOLIC CONGESTIVE HEART FAILURE: ICD-10-CM

## 2022-07-27 DIAGNOSIS — I71.9 PSEUDOANEURYSM OF AORTA: ICD-10-CM

## 2022-07-27 DIAGNOSIS — Z86.718 HISTORY OF DVT OF LOWER EXTREMITY: ICD-10-CM

## 2022-07-27 DIAGNOSIS — R47.01 BROCA'S APHASIA: ICD-10-CM

## 2022-07-27 DIAGNOSIS — Z87.891 FORMER SMOKER: ICD-10-CM

## 2022-07-27 DIAGNOSIS — I10 ESSENTIAL HYPERTENSION: ICD-10-CM

## 2022-07-27 DIAGNOSIS — R47.01 APHASIA: ICD-10-CM

## 2022-07-27 DIAGNOSIS — R94.30 CARDIAC LV EJECTION FRACTION OF 40-49%: ICD-10-CM

## 2022-07-27 PROCEDURE — 99215 OFFICE O/P EST HI 40 MIN: CPT | Mod: S$PBB,,, | Performed by: INTERNAL MEDICINE

## 2022-07-27 PROCEDURE — 99999 PR PBB SHADOW E&M-EST. PATIENT-LVL IV: CPT | Mod: PBBFAC,,, | Performed by: INTERNAL MEDICINE

## 2022-07-27 PROCEDURE — 99215 PR OFFICE/OUTPT VISIT, EST, LEVL V, 40-54 MIN: ICD-10-PCS | Mod: S$PBB,,, | Performed by: INTERNAL MEDICINE

## 2022-07-27 PROCEDURE — 99999 PR PBB SHADOW E&M-EST. PATIENT-LVL IV: ICD-10-PCS | Mod: PBBFAC,,, | Performed by: INTERNAL MEDICINE

## 2022-07-27 PROCEDURE — 99214 OFFICE O/P EST MOD 30 MIN: CPT | Mod: PBBFAC,PO | Performed by: INTERNAL MEDICINE
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
## 2022-07-27 NOTE — PATIENT INSTRUCTIONS
Assessment/Plan:  Noy Bourne is a 41 y.o. male with a past medical history of HTN, RLE DVT (IVC filter in place), thoracic aortic aneurysm s/p repair and CVA, seizures, smoking, who presents for a scheduled appointment.      1. Thoracic aortic aneurysm s/p repair and CVA-  Mechanism of stroke unknown.  Continue ASA, statin, beta blocker.  CT Abd/Pelvis on 2/19/2022 revealed previously demonstrated aortic stent involving the aortic arch and proximal descending thoracic aorta is again demonstrated.  No evidence of acute finding or leak.  Repeat CTA abd/pelvis in 2 years.    2. Depressed EF- Nuclear stress test from 6/28/2018 shows global hypokinesis with a low stress ejection fraction and fixed inferior wall perfusion defect.  Echo on 7/14/2021 revealed concentric remodeling and mildly decreased systolic function with EF 45%; normal left ventricular diastolic function; there is abnormal septal wall motion; normal right ventricular size with normal right ventricular systolic function; normal central venous pressure (3 mmHg).  Pt has not been consistent with follow up visits.  If he is consistent with follow ups, will start on Entresto.  Continue losartan 100 mg daily and metoprolol succinate 50 mg daily.      3. HTN- Continue current medications.        4. Smoking- Encourage smoking cessation.    Follow up in 4 months

## 2022-09-22 DIAGNOSIS — I69.320 CVA, OLD, APHASIA: ICD-10-CM

## 2022-09-22 RX ORDER — CLOPIDOGREL BISULFATE 75 MG/1
75 TABLET ORAL DAILY
Qty: 90 TABLET | Refills: 1 | Status: SHIPPED | OUTPATIENT
Start: 2022-09-22 | End: 2023-04-25 | Stop reason: SDUPTHER

## 2022-10-28 DIAGNOSIS — I10 HYPERTENSION, UNSPECIFIED TYPE: ICD-10-CM

## 2022-10-28 RX ORDER — HYDROCHLOROTHIAZIDE 12.5 MG/1
12.5 CAPSULE ORAL DAILY
Qty: 90 CAPSULE | Refills: 3 | Status: SHIPPED | OUTPATIENT
Start: 2022-10-28 | End: 2023-04-25 | Stop reason: SDUPTHER

## 2022-11-16 ENCOUNTER — OFFICE VISIT (OUTPATIENT)
Dept: CARDIOLOGY | Facility: CLINIC | Age: 42
End: 2022-11-16
Payer: MEDICARE

## 2022-11-16 VITALS
DIASTOLIC BLOOD PRESSURE: 82 MMHG | BODY MASS INDEX: 30.1 KG/M2 | HEIGHT: 69 IN | HEART RATE: 64 BPM | SYSTOLIC BLOOD PRESSURE: 126 MMHG | WEIGHT: 203.25 LBS

## 2022-11-16 DIAGNOSIS — Z87.891 FORMER SMOKER: ICD-10-CM

## 2022-11-16 DIAGNOSIS — R94.30 CARDIAC LV EJECTION FRACTION OF 40-49%: ICD-10-CM

## 2022-11-16 DIAGNOSIS — I69.320 CVA, OLD, APHASIA: ICD-10-CM

## 2022-11-16 DIAGNOSIS — I10 ESSENTIAL HYPERTENSION: ICD-10-CM

## 2022-11-16 DIAGNOSIS — R93.1 DECREASED CARDIAC EJECTION FRACTION: Primary | ICD-10-CM

## 2022-11-16 DIAGNOSIS — I50.22 CHRONIC SYSTOLIC CONGESTIVE HEART FAILURE: ICD-10-CM

## 2022-11-16 DIAGNOSIS — Z86.718 HISTORY OF DVT OF LOWER EXTREMITY: ICD-10-CM

## 2022-11-16 DIAGNOSIS — I71.9 PSEUDOANEURYSM OF AORTA: ICD-10-CM

## 2022-11-16 PROCEDURE — 99999 PR PBB SHADOW E&M-EST. PATIENT-LVL III: ICD-10-PCS | Mod: PBBFAC,,, | Performed by: INTERNAL MEDICINE

## 2022-11-16 PROCEDURE — 99213 OFFICE O/P EST LOW 20 MIN: CPT | Mod: PBBFAC,PO | Performed by: INTERNAL MEDICINE

## 2022-11-16 PROCEDURE — 99215 OFFICE O/P EST HI 40 MIN: CPT | Mod: S$PBB,,, | Performed by: INTERNAL MEDICINE

## 2022-11-16 PROCEDURE — 99215 PR OFFICE/OUTPT VISIT, EST, LEVL V, 40-54 MIN: ICD-10-PCS | Mod: S$PBB,,, | Performed by: INTERNAL MEDICINE

## 2022-11-16 PROCEDURE — 99999 PR PBB SHADOW E&M-EST. PATIENT-LVL III: CPT | Mod: PBBFAC,,, | Performed by: INTERNAL MEDICINE
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
## 2022-11-23 ENCOUNTER — HOSPITAL ENCOUNTER (OUTPATIENT)
Dept: CARDIOLOGY | Facility: HOSPITAL | Age: 42
Discharge: HOME | End: 2022-11-23
Attending: INTERNAL MEDICINE
Payer: MEDICARE

## 2022-11-23 VITALS
SYSTOLIC BLOOD PRESSURE: 112 MMHG | WEIGHT: 203 LBS | HEIGHT: 69 IN | HEART RATE: 56 BPM | BODY MASS INDEX: 30.07 KG/M2 | DIASTOLIC BLOOD PRESSURE: 64 MMHG

## 2022-11-23 DIAGNOSIS — R94.30 CARDIAC LV EJECTION FRACTION OF 40-49%: ICD-10-CM

## 2022-11-23 DIAGNOSIS — R93.1 DECREASED CARDIAC EJECTION FRACTION: ICD-10-CM

## 2022-11-23 LAB
ASCENDING AORTA: 3.08 CM
AV INDEX (PROSTH): 0.87
AV MEAN GRADIENT: 2 MMHG
AV PEAK GRADIENT: 4 MMHG
AV VALVE AREA: 3.07 CM2
AV VELOCITY RATIO: 0.87
BSA FOR ECHO PROCEDURE: 2.12 M2
CV ECHO LV RWT: 0.33 CM
DOP CALC AO PEAK VEL: 0.98 M/S
DOP CALC AO VTI: 21.54 CM
DOP CALC LVOT AREA: 3.5 CM2
DOP CALC LVOT DIAMETER: 2.12 CM
DOP CALC LVOT PEAK VEL: 0.85 M/S
DOP CALC LVOT STROKE VOLUME: 66.08 CM3
DOP CALCLVOT PEAK VEL VTI: 18.73 CM
E WAVE DECELERATION TIME: 225.53 MSEC
E/A RATIO: 2.05
E/E' RATIO: 5.85 M/S
ECHO LV POSTERIOR WALL: 0.7 CM (ref 0.6–1.1)
EJECTION FRACTION: 58 %
FRACTIONAL SHORTENING: 25 % (ref 28–44)
INTERVENTRICULAR SEPTUM: 0.68 CM (ref 0.6–1.1)
IVRT: 102.76 MSEC
LA MAJOR: 5.29 CM
LA MINOR: 4.86 CM
LA WIDTH: 3.56 CM
LEFT ATRIUM SIZE: 2.79 CM
LEFT ATRIUM VOLUME INDEX MOD: 23 ML/M2
LEFT ATRIUM VOLUME INDEX: 20.6 ML/M2
LEFT ATRIUM VOLUME MOD: 47.79 CM3
LEFT ATRIUM VOLUME: 42.77 CM3
LEFT INTERNAL DIMENSION IN SYSTOLE: 3.23 CM (ref 2.1–4)
LEFT VENTRICLE DIASTOLIC VOLUME INDEX: 39.81 ML/M2
LEFT VENTRICLE DIASTOLIC VOLUME: 82.8 ML
LEFT VENTRICLE MASS INDEX: 42 G/M2
LEFT VENTRICLE SYSTOLIC VOLUME INDEX: 20.1 ML/M2
LEFT VENTRICLE SYSTOLIC VOLUME: 41.77 ML
LEFT VENTRICULAR INTERNAL DIMENSION IN DIASTOLE: 4.29 CM (ref 3.5–6)
LEFT VENTRICULAR MASS: 86.57 G
LV LATERAL E/E' RATIO: 5.85 M/S
LV SEPTAL E/E' RATIO: 5.85 M/S
MV PEAK A VEL: 0.37 M/S
MV PEAK E VEL: 0.76 M/S
MV STENOSIS PRESSURE HALF TIME: 65.4 MS
MV VALVE AREA P 1/2 METHOD: 3.36 CM2
PISA TR MAX VEL: 1.98 M/S
PULM VEIN S/D RATIO: 0.74
PV PEAK D VEL: 0.58 M/S
PV PEAK S VEL: 0.43 M/S
QEF: 58 %
RA MAJOR: 4.47 CM
RA PRESSURE: 3 MMHG
RA WIDTH: 3.35 CM
RIGHT VENTRICULAR END-DIASTOLIC DIMENSION: 3.22 CM
SINUS: 3.43 CM
STJ: 2.93 CM
TDI LATERAL: 0.13 M/S
TDI SEPTAL: 0.13 M/S
TDI: 0.13 M/S
TR MAX PG: 16 MMHG
TRICUSPID ANNULAR PLANE SYSTOLIC EXCURSION: 1.74 CM
TV REST PULMONARY ARTERY PRESSURE: 19 MMHG

## 2022-11-23 PROCEDURE — 93306 TTE W/DOPPLER COMPLETE: CPT | Mod: 26,,, | Performed by: INTERNAL MEDICINE

## 2022-11-23 PROCEDURE — 93306 ECHO (CUPID ONLY): ICD-10-PCS | Mod: 26,,, | Performed by: INTERNAL MEDICINE

## 2022-11-23 PROCEDURE — 93306 TTE W/DOPPLER COMPLETE: CPT

## 2023-01-26 DIAGNOSIS — I69.320 CVA, OLD, APHASIA: ICD-10-CM

## 2023-01-27 RX ORDER — ATORVASTATIN CALCIUM 80 MG/1
80 TABLET, FILM COATED ORAL DAILY
Qty: 90 TABLET | Refills: 3 | Status: SHIPPED | OUTPATIENT
Start: 2023-01-27 | End: 2023-04-25 | Stop reason: SDUPTHER

## 2023-02-10 NOTE — TELEPHONE ENCOUNTER
----- Message from Kee Lai sent at 8/10/2018 11:10 AM CDT -----  Type:  Pharmacy Calling to Clarify an RX    Name of Caller: sameer   Pharmacy Name:  [facility] Prescription Name:    What do they need to clarify?:  Losartan interacts with rx lisinopril  Best Call Back Number:  [phone] Additional Information:  Pharmacy called stating there is a drug interact with recent prescribed rx      [facility] he had high sugar. They changed medications. I'd like him to get in for A1C. He's past TCM contact window so timing doesn't matter. He has appointment in a week with Devora.

## 2023-04-25 ENCOUNTER — OFFICE VISIT (OUTPATIENT)
Dept: FAMILY MEDICINE | Facility: HOSPITAL | Age: 43
End: 2023-04-25
Payer: MEDICARE

## 2023-04-25 VITALS
HEART RATE: 81 BPM | WEIGHT: 186.31 LBS | SYSTOLIC BLOOD PRESSURE: 104 MMHG | BODY MASS INDEX: 27.6 KG/M2 | HEIGHT: 69 IN | DIASTOLIC BLOOD PRESSURE: 64 MMHG

## 2023-04-25 DIAGNOSIS — I50.22 CHRONIC SYSTOLIC CONGESTIVE HEART FAILURE: ICD-10-CM

## 2023-04-25 DIAGNOSIS — I10 ESSENTIAL HYPERTENSION: Primary | ICD-10-CM

## 2023-04-25 DIAGNOSIS — G40.909 SEIZURE DISORDER: ICD-10-CM

## 2023-04-25 DIAGNOSIS — K42.9 UMBILICAL HERNIA WITHOUT OBSTRUCTION AND WITHOUT GANGRENE: ICD-10-CM

## 2023-04-25 DIAGNOSIS — I69.320 CVA, OLD, APHASIA: ICD-10-CM

## 2023-04-25 PROBLEM — R47.01 APHASIA DUE TO ACUTE CEREBROVASCULAR ACCIDENT (CVA): Status: ACTIVE | Noted: 2018-04-27

## 2023-04-25 PROBLEM — I71.20 THORACIC AORTIC ANEURYSM (TAA): Status: ACTIVE | Noted: 2018-04-19

## 2023-04-25 PROBLEM — I63.9 APHASIA DUE TO ACUTE CEREBROVASCULAR ACCIDENT (CVA): Status: ACTIVE | Noted: 2018-04-27

## 2023-04-25 PROCEDURE — 99213 OFFICE O/P EST LOW 20 MIN: CPT | Performed by: STUDENT IN AN ORGANIZED HEALTH CARE EDUCATION/TRAINING PROGRAM

## 2023-04-25 RX ORDER — HYDROCHLOROTHIAZIDE 12.5 MG/1
12.5 CAPSULE ORAL DAILY
Qty: 90 CAPSULE | Refills: 3 | Status: SHIPPED | OUTPATIENT
Start: 2023-04-25 | End: 2023-07-03 | Stop reason: SDUPTHER

## 2023-04-25 RX ORDER — METOPROLOL SUCCINATE 50 MG/1
50 TABLET, EXTENDED RELEASE ORAL DAILY
Qty: 90 TABLET | Refills: 3 | Status: SHIPPED | OUTPATIENT
Start: 2023-04-25 | End: 2023-07-03 | Stop reason: SDUPTHER

## 2023-04-25 RX ORDER — LOSARTAN POTASSIUM 50 MG/1
50 TABLET ORAL DAILY
Qty: 90 TABLET | Refills: 3 | Status: SHIPPED | OUTPATIENT
Start: 2023-04-25 | End: 2023-07-03 | Stop reason: SDUPTHER

## 2023-04-25 RX ORDER — CLOPIDOGREL BISULFATE 75 MG/1
75 TABLET ORAL DAILY
Qty: 90 TABLET | Refills: 1 | Status: SHIPPED | OUTPATIENT
Start: 2023-04-25 | End: 2023-07-03 | Stop reason: SDUPTHER

## 2023-04-25 RX ORDER — LEVETIRACETAM 500 MG/1
1000 TABLET, EXTENDED RELEASE ORAL NIGHTLY
Qty: 180 TABLET | Refills: 3 | Status: SHIPPED | OUTPATIENT
Start: 2023-04-25 | End: 2023-07-03 | Stop reason: SDUPTHER

## 2023-04-25 RX ORDER — ATORVASTATIN CALCIUM 80 MG/1
80 TABLET, FILM COATED ORAL DAILY
Qty: 90 TABLET | Refills: 3 | Status: SHIPPED | OUTPATIENT
Start: 2023-04-25 | End: 2023-07-03 | Stop reason: SDUPTHER

## 2023-04-25 RX ORDER — ASPIRIN 81 MG/1
81 TABLET ORAL DAILY
Qty: 90 TABLET | Refills: 3 | Status: SHIPPED | OUTPATIENT
Start: 2023-04-25 | End: 2024-04-24

## 2023-04-25 NOTE — PROGRESS NOTES
Subjective:      Patient ID: Noy Bourne is a 42 y.o. male.    Chief Complaint: Annual Exam, Medication Refill, and Abdominal Pain    HPI 41 yo male     #Annual   Needs all medications refilled    #Abdominal discomfort  Umbilical to right-sided     Past Medical History:   Diagnosis Date    DVT (deep venous thrombosis)     High cholesterol     Hypertension     Seizures     Stroke      Review of Systems     10 point review of systems was conducted and only the pertinent positives and pertinent negatives are noted above in the HPI section.    Objective:     Vitals:    04/25/23 1504   BP: 104/64   Pulse: 81     Physical Exam  Vitals reviewed.   Constitutional:       General: He is not in acute distress.     Appearance: Normal appearance. He is not ill-appearing.   Eyes:      Extraocular Movements: Extraocular movements intact.      Conjunctiva/sclera: Conjunctivae normal.      Pupils: Pupils are equal, round, and reactive to light.   Abdominal:      General: A surgical scar is present. Bowel sounds are normal. There is no distension.      Palpations: Abdomen is soft. There is no mass.      Tenderness: There is no abdominal tenderness. There is no guarding.      Hernia: A hernia is present. Hernia is present in the umbilical area.   Neurological:      Mental Status: He is alert.     Assessment:      1. Essential hypertension    2. Umbilical hernia without obstruction and without gangrene    3. CVA, old, aphasia    4. Seizure disorder    5. Chronic systolic congestive heart failure      Plan:     Essential hypertension   Well controlled, continue current regimen  -     aspirin (ECOTRIN) 81 MG EC tablet; Take 1 tablet (81 mg total) by mouth once daily.  Dispense: 90 tablet; Refill: 3  -     hydroCHLOROthiazide (MICROZIDE) 12.5 mg capsule; Take 1 capsule (12.5 mg total) by mouth once daily.  Dispense: 90 capsule; Refill: 3  -     losartan (COZAAR) 50 MG tablet; Take 1 tablet (50 mg total) by mouth once daily.   Dispense: 90 tablet; Refill: 3  -     metoprolol succinate (TOPROL-XL) 50 MG 24 hr tablet; Take 1 tablet (50 mg total) by mouth once daily.  Dispense: 90 tablet; Refill: 3  -     CBC Auto Differential; Future; Expected date: 04/25/2023  -     Comprehensive Metabolic Panel; Future; Expected date: 04/25/2023    Umbilical hernia without obstruction and without gangrene   Stable, reducible    CVA, old, aphasia  -     aspirin (ECOTRIN) 81 MG EC tablet; Take 1 tablet (81 mg total) by mouth once daily.  Dispense: 90 tablet; Refill: 3  -     atorvastatin (LIPITOR) 80 MG tablet; Take 1 tablet (80 mg total) by mouth once daily.  Dispense: 90 tablet; Refill: 3  -     clopidogreL (PLAVIX) 75 mg tablet; Take 1 tablet (75 mg total) by mouth once daily.  Dispense: 90 tablet; Refill: 1    Seizure disorder  -     levetiracetam XR (KEPPRA XR) 500 mg Tb24 24 hr tablet; Take 2 tablets (1,000 mg total) by mouth nightly.  Dispense: 180 tablet; Refill: 3    Chronic systolic congestive heart failure  -     metoprolol succinate (TOPROL-XL) 50 MG 24 hr tablet; Take 1 tablet (50 mg total) by mouth once daily.  Dispense: 90 tablet; Refill: 3      Follow up in about 1 year (around 4/25/2024), or if symptoms worsen or fail to improve, for annual.    Kathryn Ulloa MD, [facility] PGY-3  04/25/2023

## 2023-04-25 NOTE — PROGRESS NOTES
I assume primary medical responsibility for this patient. I have reviewed the case history, findings, diagnosis and treatment plan with the resident and agree that the care is reasonable and necessary. This service has been performed by a resident without the presence of a teaching physician under the primary care exception.

## 2023-06-30 ENCOUNTER — HOSPITAL ENCOUNTER (EMERGENCY)
Facility: HOSPITAL | Age: 43
Discharge: HOME | End: 2023-06-30
Attending: EMERGENCY MEDICINE
Payer: MEDICARE

## 2023-06-30 VITALS
HEIGHT: 69 IN | OXYGEN SATURATION: 99 % | DIASTOLIC BLOOD PRESSURE: 84 MMHG | BODY MASS INDEX: 25.92 KG/M2 | TEMPERATURE: 98 F | SYSTOLIC BLOOD PRESSURE: 120 MMHG | HEART RATE: 58 BPM | RESPIRATION RATE: 19 BRPM | WEIGHT: 175 LBS

## 2023-06-30 DIAGNOSIS — Z76.0 MEDICATION REFILL: Primary | ICD-10-CM

## 2023-06-30 PROCEDURE — 99282 EMERGENCY DEPT VISIT SF MDM: CPT | Mod: ER

## 2023-06-30 RX ORDER — METOPROLOL SUCCINATE 50 MG/1
50 TABLET, EXTENDED RELEASE ORAL DAILY
Qty: 14 TABLET | Refills: 0 | Status: SHIPPED | OUTPATIENT
Start: 2023-06-30 | End: 2023-11-08 | Stop reason: SDUPTHER

## 2023-06-30 NOTE — ED PROVIDER NOTES
Encounter Date: 2023       History     Chief Complaint   Patient presents with    Medication Refill     Pt reports out of metoprolol x2 days here for medication refill until follow up with pcp. Pt denies light headedness, dizziness, nausea or visual changes. Currently denies head pain , reports head pain earlier yesterday now resolved.      Patient presents requesting medication refill.  He is metoprolol prescribed to him 50 mg of extended release tablet daily.  I reviewed a visit to the [facility] that he had an April of this year and it was written that he had 90 pills with 3 refills.  I discussed this with him and he stated that when he called his pharmacy they said that they did not have any refills and that he would need to call his primary care provider.  He has no acute complaints at this time.    Review of patient's allergies indicates:  No Known Allergies  Past Medical History:   Diagnosis Date    DVT (deep venous thrombosis)     High cholesterol     Hypertension     Seizures     Stroke      Past Surgical History:   Procedure Laterality Date    ABDOMINAL SURGERY      AORTIC ARCH REPAIR      AORTIC VALVE SURGERY      DIAPHRAGM SURGERY       Family History   Problem Relation Age of Onset    Hypertension Mother     Diabetes Mother     Cancer Father      Social History     Tobacco Use    Smoking status: Former     Packs/day: 0.50     Types: Cigarettes     Quit date: 2021     Years since quittin.1    Smokeless tobacco: Never   Substance Use Topics    Alcohol use: Yes     Alcohol/week: 2.0 standard drinks     Types: 2 Cans of beer per week     Comment: occassional    Drug use: Yes     Types: Marijuana     Comment: 2 weeks ago     Review of Systems   Respiratory:  Negative for shortness of breath.    Cardiovascular:  Negative for chest pain.     Physical Exam     Initial Vitals [23 0440]   BP Pulse Resp Temp SpO2   128/84 62 18 97.8 °F (36.6 °C) 100 %      MAP       --          Physical Exam    Vitals reviewed.  Constitutional: He appears well-developed.   Cardiovascular:  Normal rate and regular rhythm.           No murmur heard.  Pulmonary/Chest: Breath sounds normal. He has no wheezes.   Abdominal: Abdomen is soft. There is no abdominal tenderness.   Musculoskeletal:         General: Normal range of motion.     Neurological: He is alert and oriented to person, place, and time.   Skin: Skin is warm and dry. Capillary refill takes less than 2 seconds.   Psychiatric: He has a normal mood and affect.       ED Course   Procedures  Labs Reviewed - No data to display       Imaging Results    None          Medications - No data to display  Medical Decision Making:   ED Management:  Instructed patient to return immediately for any new or worsening symptoms and he verbalized understanding.                        Clinical Impression:   Final diagnoses:  [Z76.0] Medication refill (Primary)        ED Disposition Condition    Discharge Stable          ED Prescriptions       Medication Sig Dispense Start Date End Date Auth. Provider    metoprolol succinate (TOPROL-XL) 50 MG 24 hr tablet Take 1 tablet (50 mg total) by mouth once daily. 14 tablet 6/30/2023 -- Karlos Adhikari DO          Follow-up Information       Follow up With Specialties Details Why Contact Info    Baljinder Steward MD Family Medicine Schedule an appointment as soon as possible for a visit   [address] [place] [address]  [phone]               Karlos Adhikari DO  06/30/23 1933

## 2023-06-30 NOTE — DISCHARGE INSTRUCTIONS
1.)  Call your primary care provider later today to inquire about the prescription that they wrote for you in April.

## 2023-07-03 ENCOUNTER — OFFICE VISIT (OUTPATIENT)
Dept: FAMILY MEDICINE | Facility: HOSPITAL | Age: 43
End: 2023-07-03
Payer: MEDICARE

## 2023-07-03 VITALS
BODY MASS INDEX: 26.02 KG/M2 | SYSTOLIC BLOOD PRESSURE: 109 MMHG | HEIGHT: 69 IN | WEIGHT: 175.69 LBS | DIASTOLIC BLOOD PRESSURE: 69 MMHG | HEART RATE: 85 BPM

## 2023-07-03 DIAGNOSIS — E66.3 OVERWEIGHT (BMI 25.0-29.9): Primary | ICD-10-CM

## 2023-07-03 DIAGNOSIS — I69.320 CVA, OLD, APHASIA: ICD-10-CM

## 2023-07-03 DIAGNOSIS — I10 ESSENTIAL HYPERTENSION: ICD-10-CM

## 2023-07-03 DIAGNOSIS — I50.22 CHRONIC SYSTOLIC CONGESTIVE HEART FAILURE: ICD-10-CM

## 2023-07-03 DIAGNOSIS — G40.909 SEIZURE DISORDER: ICD-10-CM

## 2023-07-03 PROCEDURE — 99213 OFFICE O/P EST LOW 20 MIN: CPT | Performed by: STUDENT IN AN ORGANIZED HEALTH CARE EDUCATION/TRAINING PROGRAM

## 2023-07-03 RX ORDER — HYDROCHLOROTHIAZIDE 12.5 MG/1
12.5 CAPSULE ORAL DAILY
Qty: 90 CAPSULE | Refills: 3 | Status: SHIPPED | OUTPATIENT
Start: 2023-07-03 | End: 2024-07-02

## 2023-07-03 RX ORDER — ATORVASTATIN CALCIUM 80 MG/1
80 TABLET, FILM COATED ORAL DAILY
Qty: 90 TABLET | Refills: 3 | Status: SHIPPED | OUTPATIENT
Start: 2023-07-03 | End: 2024-07-02

## 2023-07-03 RX ORDER — METOPROLOL SUCCINATE 50 MG/1
50 TABLET, EXTENDED RELEASE ORAL DAILY
Qty: 90 TABLET | Refills: 3 | Status: SHIPPED | OUTPATIENT
Start: 2023-07-03 | End: 2024-07-02

## 2023-07-03 RX ORDER — LEVETIRACETAM 500 MG/1
1000 TABLET, EXTENDED RELEASE ORAL NIGHTLY
Qty: 180 TABLET | Refills: 3 | Status: SHIPPED | OUTPATIENT
Start: 2023-07-03 | End: 2024-07-02

## 2023-07-03 RX ORDER — LOSARTAN POTASSIUM 50 MG/1
50 TABLET ORAL DAILY
Qty: 90 TABLET | Refills: 3 | Status: SHIPPED | OUTPATIENT
Start: 2023-07-03 | End: 2024-06-27

## 2023-07-03 RX ORDER — CLOPIDOGREL BISULFATE 75 MG/1
75 TABLET ORAL DAILY
Qty: 90 TABLET | Refills: 1 | Status: SHIPPED | OUTPATIENT
Start: 2023-07-03 | End: 2023-12-19 | Stop reason: SDUPTHER

## 2023-07-03 NOTE — PROGRESS NOTES
"Clinic Note  [facility]    Subjective:      Noy Bourne is a 42 y.o. male with PMHx HTN, RLE DVT (IVC filter in place), thoracic aortic aneurysm s/p repair and CVA, seizures. Patient here today in clinic feeling "great." Just needs refills of the following medications (to which he endorses adherence):     Current Outpatient Medications:     atorvastatin (LIPITOR) 80 MG tablet, Take 1 tablet (80 mg total) by mouth once daily., Disp: 90 tablet, Rfl: 3    clopidogreL (PLAVIX) 75 mg tablet, Take 1 tablet (75 mg total) by mouth once daily., Disp: 90 tablet, Rfl: 1    hydroCHLOROthiazide (MICROZIDE) 12.5 mg capsule, Take 1 capsule (12.5 mg total) by mouth once daily., Disp: 90 capsule, Rfl: 3    levetiracetam XR (KEPPRA XR) 500 mg Tb24 24 hr tablet, Take 2 tablets (1,000 mg total) by mouth nightly., Disp: 180 tablet, Rfl: 3    losartan (COZAAR) 50 MG tablet, Take 1 tablet (50 mg total) by mouth once daily., Disp: 90 tablet, Rfl: 3    metoprolol succinate (TOPROL-XL) 50 MG 24 hr tablet, Take 1 tablet (50 mg total) by mouth once daily., Disp: 90 tablet, Rfl: 3      Review of Systems   Constitutional:  Negative for chills and fever.   HENT:  Negative for congestion and sore throat.    Respiratory:  Negative for cough.    Cardiovascular:  Negative for chest pain and palpitations.   Gastrointestinal:  Negative for abdominal pain, diarrhea, nausea and vomiting.   Genitourinary:  Negative for dysuria.   Musculoskeletal:  Negative for joint pain and myalgias.   Neurological:  Negative for dizziness, tingling, weakness and headaches.   Psychiatric/Behavioral:  Negative for depression. The patient is not nervous/anxious.         Objective:      Vitals:    07/03/23 1452   BP: 109/69   Pulse: 85     Body mass index is 25.95 kg/m².      Physical Exam  Constitutional:       Appearance: Normal appearance.   HENT:      Mouth/Throat:      Mouth: Mucous membranes are moist.      Pharynx: Oropharynx is clear. "   Cardiovascular:      Rate and Rhythm: Normal rate and regular rhythm.      Pulses: Normal pulses.      Heart sounds: Normal heart sounds.   Pulmonary:      Effort: Pulmonary effort is normal.      Breath sounds: Normal breath sounds.   Abdominal:      General: Abdomen is flat. Bowel sounds are normal.      Palpations: Abdomen is soft.   Musculoskeletal:      Right lower leg: No edema.      Left lower leg: No edema.   Skin:     General: Skin is warm and dry.      Capillary Refill: Capillary refill takes less than 2 seconds.   Neurological:      General: No focal deficit present.      Mental Status: He is alert and oriented to person, place, and time. Mental status is at baseline.   Psychiatric:         Mood and Affect: Mood normal.         Behavior: Behavior normal.          Assessment/Plan:      Patient well-appearing, has history of aphasia but today it is hardly noticeable. Refills provided. Patient amenable to routine lab screening.    1. Essential hypertension    - metoprolol succinate (TOPROL-XL) 50 MG 24 hr tablet; Take 1 tablet (50 mg total) by mouth once daily.  Dispense: 90 tablet; Refill: 3  - losartan (COZAAR) 50 MG tablet; Take 1 tablet (50 mg total) by mouth once daily.  Dispense: 90 tablet; Refill: 3  - hydroCHLOROthiazide (MICROZIDE) 12.5 mg capsule; Take 1 capsule (12.5 mg total) by mouth once daily.  Dispense: 90 capsule; Refill: 3  - Comprehensive Metabolic Panel; Future  - CBC Auto Differential; Future    2. CVA, old, aphasia    - clopidogreL (PLAVIX) 75 mg tablet; Take 1 tablet (75 mg total) by mouth once daily.  Dispense: 90 tablet; Refill: 1  - atorvastatin (LIPITOR) 80 MG tablet; Take 1 tablet (80 mg total) by mouth once daily.  Dispense: 90 tablet; Refill: 3  - Comprehensive Metabolic Panel; Future  - CBC Auto Differential; Future    3. Chronic systolic congestive heart failure    - metoprolol succinate (TOPROL-XL) 50 MG 24 hr tablet; Take 1 tablet (50 mg total) by mouth once daily.   Dispense: 90 tablet; Refill: 3  - Comprehensive Metabolic Panel; Future  - CBC Auto Differential; Future    4. Seizure disorder    - levetiracetam XR (KEPPRA XR) 500 mg Tb24 24 hr tablet; Take 2 tablets (1,000 mg total) by mouth nightly.  Dispense: 180 tablet; Refill: 3        Patient discussed with attending physician, Dr. Letha Dougherty MD  [facility] PGY-3  07/03/2023      The following information is provided to all patients.  This information is to help you find resources for any of the problems found today that may be affecting your health:                Living healthy guide: www.[facility].[email or web address].[facility]       Understanding Diabetes: www.diabetes.org       Eating healthy: www.cdc.gov/healthyweight      CDC home safety checklist: www.cdc.gov/steadi/patient.html      Agency on Aging: www.goea.[place].[facility]       Alcoholics anonymous (AA): www.aa.org      Physical Activity: www.natalie.nih.gov/rs7vyiv       Tobacco use: www.quitwithusla.org         Patient says he feels great today. Needs refills of below meds.      Current Outpatient Medications:     atorvastatin (LIPITOR) 80 MG tablet, Take 1 tablet (80 mg total) by mouth once daily., Disp: 90 tablet, Rfl: 3    clopidogreL (PLAVIX) 75 mg tablet, Take 1 tablet (75 mg total) by mouth once daily., Disp: 90 tablet, Rfl: 1    hydroCHLOROthiazide (MICROZIDE) 12.5 mg capsule, Take 1 capsule (12.5 mg total) by mouth once daily., Disp: 90 capsule, Rfl: 3    levetiracetam XR (KEPPRA XR) 500 mg Tb24 24 hr tablet, Take 2 tablets (1,000 mg total) by mouth nightly., Disp: 180 tablet, Rfl: 3    losartan (COZAAR) 50 MG tablet, Take 1 tablet (50 mg total) by mouth once daily., Disp: 90 tablet, Rfl: 3    metoprolol succinate (TOPROL-XL) 50 MG 24 hr tablet, Take 1 tablet (50 mg total) by mouth once daily., Disp: 90 tablet, Rfl: 3    aspirin (ECOTRIN) 81 MG EC tablet, Take 1 tablet (81 mg total) by mouth once daily. (Patient not taking: Reported on 7/3/2023), Disp: 90  tablet, Rfl: 3    metoprolol succinate (TOPROL-XL) 50 MG 24 hr tablet, Take 1 tablet (50 mg total) by mouth once daily. (Patient not taking: Reported on 7/3/2023), Disp: 14 tablet, Rfl: 0

## 2023-07-31 PROBLEM — R47.01 APHASIA DUE TO ACUTE CEREBROVASCULAR ACCIDENT (CVA): Status: RESOLVED | Noted: 2018-04-27 | Resolved: 2023-07-31

## 2023-07-31 PROBLEM — I63.9 APHASIA DUE TO ACUTE CEREBROVASCULAR ACCIDENT (CVA): Status: RESOLVED | Noted: 2018-04-27 | Resolved: 2023-07-31

## 2023-10-28 ENCOUNTER — HOSPITAL ENCOUNTER (EMERGENCY)
Facility: HOSPITAL | Age: 43
Discharge: HOME | End: 2023-10-28
Attending: FAMILY MEDICINE
Payer: MEDICARE

## 2023-10-28 VITALS
SYSTOLIC BLOOD PRESSURE: 130 MMHG | TEMPERATURE: 99 F | OXYGEN SATURATION: 96 % | DIASTOLIC BLOOD PRESSURE: 85 MMHG | BODY MASS INDEX: 27.4 KG/M2 | RESPIRATION RATE: 19 BRPM | HEIGHT: 69 IN | HEART RATE: 105 BPM | WEIGHT: 185 LBS

## 2023-10-28 DIAGNOSIS — R30.0 DYSURIA: Primary | ICD-10-CM

## 2023-10-28 LAB
BACTERIA #/AREA URNS AUTO: ABNORMAL /HPF
BILIRUB UR QL STRIP: NEGATIVE
CLARITY UR REFRACT.AUTO: CLEAR
COLOR UR AUTO: YELLOW
GLUCOSE UR QL STRIP: NEGATIVE
HGB UR QL STRIP: ABNORMAL
HYALINE CASTS UR QL AUTO: 0 /LPF
KETONES UR QL STRIP: NEGATIVE
LEUKOCYTE ESTERASE UR QL STRIP: ABNORMAL
MICROSCOPIC COMMENT: ABNORMAL
NITRITE UR QL STRIP: NEGATIVE
PH UR STRIP: 6 [PH] (ref 5–8)
PROT UR QL STRIP: ABNORMAL
RBC #/AREA URNS AUTO: 2 /HPF (ref 0–4)
SP GR UR STRIP: >=1.03 (ref 1–1.03)
URN SPEC COLLECT METH UR: ABNORMAL
UROBILINOGEN UR STRIP-ACNC: 1 EU/DL
WBC #/AREA URNS AUTO: 12 /HPF (ref 0–5)

## 2023-10-28 PROCEDURE — 99284 EMERGENCY DEPT VISIT MOD MDM: CPT | Mod: ER

## 2023-10-28 PROCEDURE — 87086 URINE CULTURE/COLONY COUNT: CPT | Mod: ER | Performed by: FAMILY MEDICINE

## 2023-10-28 PROCEDURE — 81000 URINALYSIS NONAUTO W/SCOPE: CPT | Mod: ER | Performed by: FAMILY MEDICINE

## 2023-10-28 PROCEDURE — 25000003 PHARM REV CODE 250: Mod: ER | Performed by: FAMILY MEDICINE

## 2023-10-28 RX ORDER — PHENAZOPYRIDINE HYDROCHLORIDE 100 MG/1
200 TABLET, FILM COATED ORAL
Status: DISCONTINUED | OUTPATIENT
Start: 2023-10-28 | End: 2023-10-28 | Stop reason: HOSPADM

## 2023-10-28 RX ORDER — CEPHALEXIN 500 MG/1
500 CAPSULE ORAL 4 TIMES DAILY
Qty: 20 CAPSULE | Refills: 0 | Status: SHIPPED | OUTPATIENT
Start: 2023-10-28 | End: 2023-11-02

## 2023-10-28 RX ORDER — ACETAMINOPHEN 325 MG/1
650 TABLET ORAL
Status: COMPLETED | OUTPATIENT
Start: 2023-10-28 | End: 2023-10-28

## 2023-10-28 RX ORDER — PHENAZOPYRIDINE HYDROCHLORIDE 100 MG/1
200 TABLET, FILM COATED ORAL
Status: COMPLETED | OUTPATIENT
Start: 2023-10-28 | End: 2023-10-28

## 2023-10-28 RX ORDER — PHENAZOPYRIDINE HYDROCHLORIDE 200 MG/1
200 TABLET, FILM COATED ORAL 3 TIMES DAILY PRN
Qty: 6 TABLET | Refills: 0 | Status: SHIPPED | OUTPATIENT
Start: 2023-10-28 | End: 2023-10-28 | Stop reason: SDUPTHER

## 2023-10-28 RX ORDER — CEPHALEXIN 500 MG/1
500 CAPSULE ORAL
Status: COMPLETED | OUTPATIENT
Start: 2023-10-28 | End: 2023-10-28

## 2023-10-28 RX ORDER — CEPHALEXIN 500 MG/1
500 CAPSULE ORAL 4 TIMES DAILY
Qty: 20 CAPSULE | Refills: 0 | Status: SHIPPED | OUTPATIENT
Start: 2023-10-28 | End: 2023-10-28 | Stop reason: SDUPTHER

## 2023-10-28 RX ORDER — PHENAZOPYRIDINE HYDROCHLORIDE 200 MG/1
200 TABLET, FILM COATED ORAL 3 TIMES DAILY PRN
Qty: 6 TABLET | Refills: 0 | Status: SHIPPED | OUTPATIENT
Start: 2023-10-28 | End: 2023-11-08

## 2023-10-28 RX ADMIN — PHENAZOPYRIDINE HYDROCHLORIDE 200 MG: 100 TABLET ORAL at 08:10

## 2023-10-28 RX ADMIN — CEPHALEXIN 500 MG: 500 CAPSULE ORAL at 08:10

## 2023-10-28 RX ADMIN — ACETAMINOPHEN 650 MG: 325 TABLET ORAL at 08:10

## 2023-10-28 NOTE — ED NOTES
PT presents to the ED with C/O lower abdominal pain, dysuria, HA, and chills x yesterday. Denies hematuria. VSS. Afebrile.

## 2023-10-28 NOTE — ED PROVIDER NOTES
Encounter Date: 10/28/2023       History     Chief Complaint   Patient presents with    Dysuria     Pt C/O urinary frequency with RLQ, LLQ, low back pain X 2 days     42-year-old male complains of suprapubic pain with dysuria since this morning.  Increased frequency of urination.  He denies hematuria.  No vomiting.  No diarrhea.    2018-  1. 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  2. History of prior traumatic rupture of the aorta with placement of the graft in .      The history is provided by the patient.     Review of patient's allergies indicates:  No Known Allergies  Past Medical History:   Diagnosis Date    DVT (deep venous thrombosis)     High cholesterol     Hypertension     Seizures     Stroke      Past Surgical History:   Procedure Laterality Date    ABDOMINAL SURGERY      AORTIC ARCH REPAIR      AORTIC VALVE SURGERY      DIAPHRAGM SURGERY       Family History   Problem Relation Age of Onset    Hypertension Mother     Diabetes Mother     Cancer Father      Social History     Tobacco Use    Smoking status: Former     Current packs/day: 0.00     Types: Cigarettes     Quit date: 2021     Years since quittin.5    Smokeless tobacco: Never   Substance Use Topics    Alcohol use: Yes     Alcohol/week: 2.0 standard drinks of alcohol     Types: 2 Cans of beer per week     Comment: occassional    Drug use: Yes     Types: Marijuana     Comment: 2 weeks ago     Review of Systems    Physical Exam     Initial Vitals [10/28/23 0758]   BP Pulse Resp Temp SpO2   130/85 105 19 99.4 °F (37.4 °C) 96 %      MAP       --         Physical Exam    Nursing note and vitals reviewed.  Constitutional: Vital signs are normal. He appears well-developed and well-nourished. He is active. No distress.   HENT:   Head: Normocephalic.   Nose: Nose normal.   Mouth/Throat: Oropharynx is clear and moist and mucous membranes are normal.   Eyes: Conjunctivae, EOM and lids are  normal.   Neck: Neck supple.   Normal range of motion.  Cardiovascular:  Normal rate, regular rhythm, S1 normal, S2 normal and normal heart sounds.           Pulmonary/Chest: Breath sounds normal. No respiratory distress. He has no wheezes. He has no rhonchi. He has no rales. He exhibits no tenderness.   Abdominal: Abdomen is soft. Bowel sounds are normal. He exhibits no distension and no mass. There is abdominal tenderness in the right lower quadrant, suprapubic area and left lower quadrant. There is no rebound and no guarding.   Musculoskeletal:      Right upper arm: Normal.      Left upper arm: Normal.      Cervical back: Normal range of motion and neck supple.      Right lower leg: Normal.      Left lower leg: Normal.     Neurological: He is alert and oriented to person, place, and time. He has normal strength. GCS score is 15. GCS eye subscore is 4. GCS verbal subscore is 5. GCS motor subscore is 6.   Skin: Skin is warm. Capillary refill takes less than 2 seconds.   Psychiatric: He has a normal mood and affect. His speech is normal and behavior is normal. Thought content normal. Cognition and memory are normal.         ED Course   Procedures  Labs Reviewed   URINALYSIS, REFLEX TO URINE CULTURE - Abnormal; Notable for the following components:       Result Value    Specific Gravity, UA >=1.030 (*)     Protein, UA 1+ (*)     Occult Blood UA 3+ (*)     Leukocytes, UA Trace (*)     All other components within normal limits    Narrative:     Preferred Collection Type->Urine, Clean Catch  Specimen Source->Urine   URINALYSIS MICROSCOPIC - Abnormal; Notable for the following components:    WBC, UA 12 (*)     All other components within normal limits    Narrative:     Preferred Collection Type->Urine, Clean Catch  Specimen Source->Urine   CULTURE, URINE    Narrative:     Preferred Collection Type->Urine, Clean Catch  Specimen Source->Urine          Imaging Results    None          Medications   cephALEXin capsule 500 mg  (500 mg Oral Given 10/28/23 0845)   acetaminophen tablet 650 mg (650 mg Oral Given 10/28/23 0845)   phenazopyridine tablet 200 mg (200 mg Oral Given 10/28/23 0853)     Medical Decision Making  Pain with dysuria.    Differential diagnosis include and not limited to- UTI, dysuria, cystitis, STD.    Urinalysis mild infection.  Give him antibiotic Keflex, Pyridium and Tylenol.      Amount and/or Complexity of Data Reviewed  External Data Reviewed: notes.     Details: 2018-  1. 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  2. History of prior traumatic rupture of the aorta with placement of the graft in .    Labs: ordered.     Details: Urinalysis with 12 WBC, leukocytes trace.  Will treat symptoms    Risk  OTC drugs.  Prescription drug management.                               Clinical Impression:   Final diagnoses:  [R30.0] Dysuria (Primary)        ED Disposition Condition    Discharge Stable          ED Prescriptions       Medication Sig Dispense Start Date End Date Auth. Provider    cephALEXin (KEFLEX) 500 MG capsule  (Status: Discontinued) Take 1 capsule (500 mg total) by mouth 4 (four) times daily. for 5 days 20 capsule 10/28/2023 10/28/2023 Toribio Samayoa MD    phenazopyridine (PYRIDIUM) 200 MG tablet  (Status: Discontinued) Take 1 tablet (200 mg total) by mouth 3 (three) times daily as needed for Pain. 6 tablet 10/28/2023 10/28/2023 Toribio Samayoa MD    cephALEXin (KEFLEX) 500 MG capsule () Take 1 capsule (500 mg total) by mouth 4 (four) times daily. for 5 days 20 capsule 10/28/2023 2023 Toribio Samayoa MD    phenazopyridine (PYRIDIUM) 200 MG tablet () Take 1 tablet (200 mg total) by mouth 3 (three) times daily as needed for Pain. 6 tablet 10/28/2023 2023 Toribio Samayoa MD          Follow-up Information       Follow up With Specialties Details Why Contact Info    Baljinder Steward MD Family Medicine Schedule an  appointment as soon as possible for a visit in 2 days  [address]  [address]  [place] [address]  [phone]      [facility] - Emergency Dept Emergency Medicine Go to  If symptoms worsen [address] [facility] [address] [phone]             Toribio Samayoa MD  10/28/23 6388       Toribio Samayoa MD  11/12/23 0445

## 2023-10-30 LAB — BACTERIA UR CULT: NO GROWTH

## 2023-11-08 ENCOUNTER — OFFICE VISIT (OUTPATIENT)
Dept: CARDIOLOGY | Facility: CLINIC | Age: 43
End: 2023-11-08
Payer: MEDICARE

## 2023-11-08 VITALS
SYSTOLIC BLOOD PRESSURE: 115 MMHG | WEIGHT: 198.63 LBS | DIASTOLIC BLOOD PRESSURE: 82 MMHG | HEART RATE: 66 BPM | HEIGHT: 69 IN | BODY MASS INDEX: 29.42 KG/M2

## 2023-11-08 DIAGNOSIS — I71.20 THORACIC AORTIC ANEURYSM (TAA), UNSPECIFIED PART, UNSPECIFIED WHETHER RUPTURED: ICD-10-CM

## 2023-11-08 DIAGNOSIS — I71.9 PSEUDOANEURYSM OF AORTA: ICD-10-CM

## 2023-11-08 DIAGNOSIS — Z86.718 HISTORY OF DVT OF LOWER EXTREMITY: ICD-10-CM

## 2023-11-08 DIAGNOSIS — R47.01 APHASIA DUE TO ACUTE CEREBROVASCULAR ACCIDENT (CVA): ICD-10-CM

## 2023-11-08 DIAGNOSIS — I63.9 APHASIA DUE TO ACUTE CEREBROVASCULAR ACCIDENT (CVA): ICD-10-CM

## 2023-11-08 DIAGNOSIS — I69.320 CVA, OLD, APHASIA: ICD-10-CM

## 2023-11-08 DIAGNOSIS — I50.22 CHRONIC SYSTOLIC CONGESTIVE HEART FAILURE: ICD-10-CM

## 2023-11-08 DIAGNOSIS — Z87.891 FORMER SMOKER: ICD-10-CM

## 2023-11-08 DIAGNOSIS — F17.200 TOBACCO USE DISORDER: ICD-10-CM

## 2023-11-08 DIAGNOSIS — I10 ESSENTIAL HYPERTENSION: ICD-10-CM

## 2023-11-08 DIAGNOSIS — R94.30 CARDIAC LV EJECTION FRACTION OF 40-49%: ICD-10-CM

## 2023-11-08 DIAGNOSIS — R93.1 DECREASED CARDIAC EJECTION FRACTION: Primary | ICD-10-CM

## 2023-11-08 PROCEDURE — 99215 PR OFFICE/OUTPT VISIT, EST, LEVL V, 40-54 MIN: ICD-10-PCS | Mod: S$PBB,,, | Performed by: INTERNAL MEDICINE

## 2023-11-08 PROCEDURE — 99215 OFFICE O/P EST HI 40 MIN: CPT | Mod: S$PBB,,, | Performed by: INTERNAL MEDICINE

## 2023-11-08 PROCEDURE — 99999 PR PBB SHADOW E&M-EST. PATIENT-LVL III: ICD-10-PCS | Mod: PBBFAC,,, | Performed by: INTERNAL MEDICINE

## 2023-11-08 PROCEDURE — 99999 PR PBB SHADOW E&M-EST. PATIENT-LVL III: CPT | Mod: PBBFAC,,, | Performed by: INTERNAL MEDICINE

## 2023-11-08 PROCEDURE — 99213 OFFICE O/P EST LOW 20 MIN: CPT | Mod: PBBFAC,PO | Performed by: INTERNAL MEDICINE

## 2023-11-08 NOTE — PATIENT INSTRUCTIONS
Assessment/Plan:  Noy Bourne is a 42 y.o. male with HTN, RLE DVT (IVC filter in place), thoracic aortic aneurysm s/p repair and CVA, seizures, smoking, who presents for a follow up appointment.      1. Thoracic aortic aneurysm s/p repair and CVA-  Mechanism of stroke unknown.  Continue ASA, statin, beta blocker.  CT Abd/Pelvis on 2/19/2022 revealed previously demonstrated aortic stent involving the aortic arch and proximal descending thoracic aorta is again demonstrated  No evidence of acute finding or leak.  Repeat CTA chest/abd/pelvis in 2 years (2/2024).    2. Depressed EF- Now resolved.  Echo on 11/23/2022 revealed EF 58% (vs 45% in 2021); normal LV diastolic function; no valvular abnormalities; normal central venous pressure (3 mmHg); estimated PA systolic pressure is 19 mmHg.  Continue losartan 100 mg daily and metoprolol succinate 50 mg daily.  Check updated echo to evalutae LVEF.     3. HTN- Continue current medications.        4. Smoking- Encourage smoking cessation.    Follow up in 6 months with CTA chest/abd/pelvis prior
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
## 2023-12-19 DIAGNOSIS — I69.320 CVA, OLD, APHASIA: ICD-10-CM

## 2023-12-19 RX ORDER — CLOPIDOGREL BISULFATE 75 MG/1
75 TABLET ORAL DAILY
Qty: 90 TABLET | Refills: 1 | Status: SHIPPED | OUTPATIENT
Start: 2023-12-19 | End: 2024-06-16

## 2024-04-18 DIAGNOSIS — G40.909 SEIZURE DISORDER: ICD-10-CM

## 2024-04-18 RX ORDER — LEVETIRACETAM 500 MG/1
1000 TABLET, EXTENDED RELEASE ORAL NIGHTLY
Qty: 180 TABLET | Refills: 2 | OUTPATIENT
Start: 2024-04-18

## 2024-07-09 DIAGNOSIS — I10 ESSENTIAL HYPERTENSION: ICD-10-CM

## 2024-07-09 NOTE — TELEPHONE ENCOUNTER
Refill Routing Note   Medication(s) are not appropriate for processing by [facility] for the following reason(s):        Non-participating provider    [facility] action(s):  Route               Appointments  past 12m or future 3m with PCP    Date Provider   Last Visit   Visit date not found Baljinder Steward MD   Next Visit   Visit date not found Baljinder Steward MD   ED visits in past 90 days: 0        Note composed:2:58 PM 07/09/2024

## 2024-07-12 RX ORDER — HYDROCHLOROTHIAZIDE 12.5 MG/1
12.5 CAPSULE ORAL
Qty: 90 CAPSULE | Refills: 2 | OUTPATIENT
Start: 2024-07-12

## 2024-07-12 RX ORDER — LOSARTAN POTASSIUM 50 MG/1
50 TABLET ORAL
Qty: 90 TABLET | Refills: 2 | OUTPATIENT
Start: 2024-07-12

## 2024-07-13 NOTE — TELEPHONE ENCOUNTER
I have not seen this patient and he hasn't been to clinic in over 1 year. Needs labs and visit for renewal. Looks like there are still refills available to him in the meantime?

## 2024-08-01 ENCOUNTER — LAB VISIT (OUTPATIENT)
Dept: LAB | Facility: HOSPITAL | Age: 44
End: 2024-08-01
Payer: MEDICARE

## 2024-08-01 ENCOUNTER — OFFICE VISIT (OUTPATIENT)
Dept: FAMILY MEDICINE | Facility: HOSPITAL | Age: 44
End: 2024-08-01
Payer: MEDICARE

## 2024-08-01 VITALS
WEIGHT: 195.75 LBS | BODY MASS INDEX: 28.99 KG/M2 | HEIGHT: 69 IN | HEART RATE: 60 BPM | DIASTOLIC BLOOD PRESSURE: 76 MMHG | SYSTOLIC BLOOD PRESSURE: 121 MMHG

## 2024-08-01 DIAGNOSIS — I50.22 CHRONIC SYSTOLIC CONGESTIVE HEART FAILURE: ICD-10-CM

## 2024-08-01 DIAGNOSIS — I10 ESSENTIAL HYPERTENSION: Primary | ICD-10-CM

## 2024-08-01 DIAGNOSIS — G40.909 SEIZURE DISORDER: ICD-10-CM

## 2024-08-01 DIAGNOSIS — I69.320 CVA, OLD, APHASIA: ICD-10-CM

## 2024-08-01 DIAGNOSIS — I10 ESSENTIAL HYPERTENSION: ICD-10-CM

## 2024-08-01 DIAGNOSIS — I77.9 DISORDER OF ARTERIES AND ARTERIOLES, UNSPECIFIED: ICD-10-CM

## 2024-08-01 DIAGNOSIS — R79.9 ABNORMAL FINDING OF BLOOD CHEMISTRY, UNSPECIFIED: ICD-10-CM

## 2024-08-01 LAB
ALBUMIN SERPL BCP-MCNC: 4.2 G/DL (ref 3.5–5.2)
ALP SERPL-CCNC: 77 U/L (ref 55–135)
ALT SERPL W/O P-5'-P-CCNC: 36 U/L (ref 10–44)
ANION GAP SERPL CALC-SCNC: 11 MMOL/L (ref 8–16)
AST SERPL-CCNC: 23 U/L (ref 10–40)
BASOPHILS # BLD AUTO: 0.04 K/UL (ref 0–0.2)
BASOPHILS NFR BLD: 0.8 % (ref 0–1.9)
BILIRUB SERPL-MCNC: 0.7 MG/DL (ref 0.1–1)
BUN SERPL-MCNC: 18 MG/DL (ref 6–20)
CALCIUM SERPL-MCNC: 9.7 MG/DL (ref 8.7–10.5)
CHLORIDE SERPL-SCNC: 105 MMOL/L (ref 95–110)
CHOLEST SERPL-MCNC: 112 MG/DL (ref 120–199)
CHOLEST/HDLC SERPL: 3.5 {RATIO} (ref 2–5)
CO2 SERPL-SCNC: 23 MMOL/L (ref 23–29)
CREAT SERPL-MCNC: 1.1 MG/DL (ref 0.5–1.4)
DIFFERENTIAL METHOD BLD: ABNORMAL
EOSINOPHIL # BLD AUTO: 0.1 K/UL (ref 0–0.5)
EOSINOPHIL NFR BLD: 2.2 % (ref 0–8)
ERYTHROCYTE [DISTWIDTH] IN BLOOD BY AUTOMATED COUNT: 12.3 % (ref 11.5–14.5)
EST. GFR  (NO RACE VARIABLE): >60 ML/MIN/1.73 M^2
ESTIMATED AVG GLUCOSE: 114 MG/DL (ref 68–131)
GLUCOSE SERPL-MCNC: 88 MG/DL (ref 70–110)
HBA1C MFR BLD: 5.6 % (ref 4–5.6)
HCT VFR BLD AUTO: 46.3 % (ref 40–54)
HDLC SERPL-MCNC: 32 MG/DL (ref 40–75)
HDLC SERPL: 28.6 % (ref 20–50)
HGB BLD-MCNC: 16.1 G/DL (ref 14–18)
IMM GRANULOCYTES # BLD AUTO: 0.01 K/UL (ref 0–0.04)
IMM GRANULOCYTES NFR BLD AUTO: 0.2 % (ref 0–0.5)
LDLC SERPL CALC-MCNC: 39.6 MG/DL (ref 63–159)
LYMPHOCYTES # BLD AUTO: 2.5 K/UL (ref 1–4.8)
LYMPHOCYTES NFR BLD: 51.1 % (ref 18–48)
MCH RBC QN AUTO: 33.3 PG (ref 27–31)
MCHC RBC AUTO-ENTMCNC: 34.8 G/DL (ref 32–36)
MCV RBC AUTO: 96 FL (ref 82–98)
MONOCYTES # BLD AUTO: 0.4 K/UL (ref 0.3–1)
MONOCYTES NFR BLD: 8 % (ref 4–15)
NEUTROPHILS # BLD AUTO: 1.9 K/UL (ref 1.8–7.7)
NEUTROPHILS NFR BLD: 37.7 % (ref 38–73)
NONHDLC SERPL-MCNC: 80 MG/DL
NRBC BLD-RTO: 0 /100 WBC
PLATELET # BLD AUTO: 184 K/UL (ref 150–450)
PMV BLD AUTO: 10 FL (ref 9.2–12.9)
POTASSIUM SERPL-SCNC: 4.3 MMOL/L (ref 3.5–5.1)
PROT SERPL-MCNC: 7.7 G/DL (ref 6–8.4)
RBC # BLD AUTO: 4.83 M/UL (ref 4.6–6.2)
SODIUM SERPL-SCNC: 139 MMOL/L (ref 136–145)
TRIGL SERPL-MCNC: 202 MG/DL (ref 30–150)
TSH SERPL DL<=0.005 MIU/L-ACNC: 0.74 UIU/ML (ref 0.4–4)
WBC # BLD AUTO: 4.97 K/UL (ref 3.9–12.7)

## 2024-08-01 PROCEDURE — 80053 COMPREHEN METABOLIC PANEL: CPT

## 2024-08-01 PROCEDURE — 99214 OFFICE O/P EST MOD 30 MIN: CPT

## 2024-08-01 PROCEDURE — 85025 COMPLETE CBC W/AUTO DIFF WBC: CPT

## 2024-08-01 PROCEDURE — 84443 ASSAY THYROID STIM HORMONE: CPT

## 2024-08-01 PROCEDURE — 80061 LIPID PANEL: CPT

## 2024-08-01 PROCEDURE — 36415 COLL VENOUS BLD VENIPUNCTURE: CPT

## 2024-08-01 PROCEDURE — 83036 HEMOGLOBIN GLYCOSYLATED A1C: CPT

## 2024-08-01 RX ORDER — METOPROLOL SUCCINATE 50 MG/1
50 TABLET, EXTENDED RELEASE ORAL DAILY
Qty: 90 TABLET | Refills: 3 | Status: SHIPPED | OUTPATIENT
Start: 2024-08-01 | End: 2025-08-01

## 2024-08-01 RX ORDER — LEVETIRACETAM 500 MG/1
1000 TABLET, EXTENDED RELEASE ORAL NIGHTLY
Qty: 180 TABLET | Refills: 3 | Status: SHIPPED | OUTPATIENT
Start: 2024-08-01 | End: 2025-08-01

## 2024-08-01 RX ORDER — HYDROCHLOROTHIAZIDE 12.5 MG/1
12.5 CAPSULE ORAL DAILY
Qty: 90 CAPSULE | Refills: 3 | Status: SHIPPED | OUTPATIENT
Start: 2024-08-01 | End: 2025-08-01

## 2024-08-01 RX ORDER — ATORVASTATIN CALCIUM 80 MG/1
80 TABLET, FILM COATED ORAL DAILY
Qty: 90 TABLET | Refills: 3 | Status: SHIPPED | OUTPATIENT
Start: 2024-08-01 | End: 2025-08-01

## 2024-08-01 RX ORDER — LOSARTAN POTASSIUM 50 MG/1
50 TABLET ORAL DAILY
Qty: 90 TABLET | Refills: 3 | Status: SHIPPED | OUTPATIENT
Start: 2024-08-01 | End: 2025-07-27

## 2024-08-01 NOTE — PROGRESS NOTES
"  [facility] PRACTICE CLINIC NOTE  Follow-up Visit      SUBJECTIVE:     Patient: Noy Bourne is a 43 y.o. male.    Chief Compliant:   Chief Complaint   Patient presents with    Medication Refill       History of Present Illness:  HTN - Denies home blood pressure monitoring. Reports compliance with medications.     Hx of aneurysm - no recent follow-up with cardi vascular surgery. Unclear on medication regimen. Past notes with recommendations unclear on medications but also to repeat CTA within 6 months which was not completed. Denies chest pain, shortness of breath, diaphoresis, weakness, vision changes, palpitations.      ROS  A 10+ review of systems was performed with pertinent positives and negatives noted above in the history of present illness. Other systems were negative unless otherwise specified.    OBJECTIVE:     Vital Signs (Most Recent)  Vitals:    08/01/24 1017   BP: 121/76   Pulse: 60   Weight: 88.8 kg (195 lb 12.3 oz)   Height: 5' 9" (1.753 m)     BMI: Body mass index is 28.91 kg/m².     Physical Exam:  Physical Exam     ASSESSMENT:   Noy Bourne is a 43 y.o. male who presents to clinic to for    1. Essential hypertension    2. CVA, old, aphasia    3. Seizure disorder    4. Chronic systolic congestive heart failure    5. Disorder of arteries and arterioles, unspecified    6. BMI 28.0-28.9,adult    7. Abnormal finding of blood chemistry, unspecified         PLAN:     Essential hypertension  - Chronic condition.  - Well controlled.  - Reviewed previous labs, tests, and/or imaging.  - Medications and/or medication refills as below.  - Orders, labs, and imaging ordered as below. Follow-up results with patient.  - Follow-up with patient on home BP monitoring.  - Explained importance of lifestyle modifications, medication compliance, and recording routine blood pressure monitoring either at home or office in a log to ensure BP is controlled. Given counseling on need to keep blood pressure at " least 140/90 and preferably 120/80 which is normal. Explained the increased risks for cardiovascular disease when BP is uncontrolled. Patient given counseling on signs and symptoms of possible elevated blood pressure.   -     hydroCHLOROthiazide (MICROZIDE) 12.5 mg capsule; Take 1 capsule (12.5 mg total) by mouth once daily.  Dispense: 90 capsule; Refill: 3  -     losartan (COZAAR) 50 MG tablet; Take 1 tablet (50 mg total) by mouth once daily.  Dispense: 90 tablet; Refill: 3  -     metoprolol succinate (TOPROL-XL) 50 MG 24 hr tablet; Take 1 tablet (50 mg total) by mouth once daily.  Dispense: 90 tablet; Refill: 3  -     Hemoglobin A1C; Future; Expected date: 08/01/2024  -     Lipid Panel; Future; Expected date: 08/01/2024  -     TSH; Future; Expected date: 08/01/2024  -     Comprehensive Metabolic Panel; Future; Expected date: 08/01/2024  -     CBC Auto Differential; Future; Expected date: 08/01/2024    CVA, old, aphasia  - Chronic condition.  - Well controlled.  - Continue current medications.  - Orders, labs, and imaging ordered as below. Follow-up results with patient.  - Referral placed as below.   -     atorvastatin (LIPITOR) 80 MG tablet; Take 1 tablet (80 mg total) by mouth once daily.  Dispense: 90 tablet; Refill: 3  -     Ambulatory referral/consult to Neurology; Future; Expected date: 08/08/2024  -     Hemoglobin A1C; Future; Expected date: 08/01/2024  -     Lipid Panel; Future; Expected date: 08/01/2024    Seizure disorder  - Chronic condition.  - Well controlled.  - Reviewed previous labs, tests, and/or imaging.  - Continue current medications.  - Medications and/or medication refills as below.  - Orders, labs, and imaging ordered as below. Follow-up results with patient.  - Referral placed as below.   -     levetiracetam XR (KEPPRA XR) 500 mg Tb24 24 hr tablet; Take 2 tablets (1,000 mg total) by mouth nightly.  Dispense: 180 tablet; Refill: 3  -     Ambulatory referral/consult to Neurology; Future;  Expected date: 08/08/2024  -     Hemoglobin A1C; Future; Expected date: 08/01/2024    Chronic systolic congestive heart failure  - Chronic condition.  - Well controlled.  - Reviewed previous labs, tests, and/or imaging.  - Continue current medications.  - Medications and/or medication refills as below.  - Referral placed as below.   -     metoprolol succinate (TOPROL-XL) 50 MG 24 hr tablet; Take 1 tablet (50 mg total) by mouth once daily.  Dispense: 90 tablet; Refill: 3  -     Ambulatory referral/consult to Cardiology; Future; Expected date: 08/08/2024  -     Hemoglobin A1C; Future; Expected date: 08/01/2024    Disorder of arteries and arterioles, unspecified  - Chronic condition.  - Moderately controlled.  - Reviewed previous labs, tests, and/or imaging.  - Continue current medications.  - Medications and/or medication refills as below.  - Orders, labs, and imaging ordered as below. Follow-up results with patient.  - Referral placed as below.   -     CTA Chest Abdomen Pelvis; Future; Expected date: 08/01/2024  -     Ambulatory referral/consult to Cardiology; Future; Expected date: 08/08/2024  -     Hemoglobin A1C; Future; Expected date: 08/01/2024  -     Lipid Panel; Future; Expected date: 08/01/2024  -     TSH; Future; Expected date: 08/01/2024  -     Comprehensive Metabolic Panel; Future; Expected date: 08/01/2024  -     CBC Auto Differential; Future; Expected date: 08/01/2024    BMI 28.0-28.9,adult  - Counseled patient on the importance maintaining a healthy diet (including at least one-half of food eaten should be whole fruits and vegetables with the core elements of the other half of food  should include grains, whole grains, dairy, protein, and oils with lower saturated fat, as well as minimizing alcohol use and consumption of foods with added sugar, saturated fat, and sodium.)   - Counseled patient on importance of exercising at least 150-300 minutes per week (i.e. at least 30 minutes, 3 days a week) of  moderate physical activity.   -     Hemoglobin A1C; Future; Expected date: 08/01/2024    Abnormal finding of blood chemistry, unspecified  - Plan as above.  -     Hemoglobin A1C; Future; Expected date: 08/01/2024        Provided patient with anticipatory guidance and return precautions. Treatment plan discussed with patient, all questions answered, and patient acknowledged understanding and verbal agreement.      Follow-up in: 2 weeks; or sooner PRN if acute concerns arise.      Case discussed with Dr. SONIA Boyle    ________________________  Sundeep Del Castillo MD  [facility] PGY-3

## 2024-08-07 ENCOUNTER — HOSPITAL ENCOUNTER (OUTPATIENT)
Dept: RADIOLOGY | Facility: HOSPITAL | Age: 44
Discharge: HOME | End: 2024-08-07
Payer: MEDICARE

## 2024-08-07 DIAGNOSIS — I77.9 DISORDER OF ARTERIES AND ARTERIOLES, UNSPECIFIED: ICD-10-CM

## 2024-08-07 PROCEDURE — 71275 CT ANGIOGRAPHY CHEST: CPT | Mod: 26,,, | Performed by: RADIOLOGY

## 2024-08-07 PROCEDURE — 74174 CTA ABD&PLVS W/CONTRAST: CPT | Mod: TC

## 2024-08-07 PROCEDURE — 25500020 PHARM REV CODE 255

## 2024-08-07 PROCEDURE — 74174 CTA ABD&PLVS W/CONTRAST: CPT | Mod: 26,,, | Performed by: RADIOLOGY

## 2024-08-07 RX ADMIN — IOHEXOL 100 ML: 350 INJECTION, SOLUTION INTRAVENOUS at 06:08

## 2024-08-15 DIAGNOSIS — I69.320 CVA, OLD, APHASIA: ICD-10-CM

## 2024-08-15 RX ORDER — CLOPIDOGREL BISULFATE 75 MG/1
75 TABLET ORAL DAILY
Qty: 90 TABLET | Refills: 1 | Status: SHIPPED | OUTPATIENT
Start: 2024-08-15 | End: 2025-02-11

## 2024-09-13 ENCOUNTER — PATIENT MESSAGE (OUTPATIENT)
Dept: FAMILY MEDICINE | Facility: HOSPITAL | Age: 44
End: 2024-09-13
Payer: MEDICARE

## 2024-09-17 ENCOUNTER — TELEPHONE (OUTPATIENT)
Dept: FAMILY MEDICINE | Facility: HOSPITAL | Age: 44
End: 2024-09-17
Payer: MEDICARE

## 2024-09-17 NOTE — TELEPHONE ENCOUNTER
----- Message from Sherron Alexis RN sent at 3/22/2023 10:10 AM CDT -----  Regarding: FW:  Labs are normal   ----- Message -----  From: Kevin Fleming MD  Sent: 3/21/2023   5:27 PM CDT  To: Sherron Alexis RN  Subject: FW:                                              Labs fine  ----- Message -----  From: Lab, Background User  Sent: 3/21/2023  11:03 AM CDT  To: Kevin Fleming MD       Called patient and spoke with wife who verified patient demographics. Called about the CT findings. Recommended to continue taking plavix and follow-up with vascular surgeon.      Case discussed with Dr. Pino Mcpherson.    ________________________  Sundeep Del Castillo MD  [facility] PGY-3

## 2024-11-21 ENCOUNTER — OFFICE VISIT (OUTPATIENT)
Dept: CARDIOLOGY | Facility: CLINIC | Age: 44
End: 2024-11-21
Payer: MEDICARE

## 2024-11-21 VITALS — BODY MASS INDEX: 27.4 KG/M2 | WEIGHT: 185 LBS | HEIGHT: 69 IN

## 2024-11-21 DIAGNOSIS — Z86.718 HISTORY OF DVT OF LOWER EXTREMITY: ICD-10-CM

## 2024-11-21 DIAGNOSIS — I71.9 PSEUDOANEURYSM OF AORTA: ICD-10-CM

## 2024-11-21 DIAGNOSIS — I69.320 CVA, OLD, APHASIA: ICD-10-CM

## 2024-11-21 DIAGNOSIS — I10 ESSENTIAL HYPERTENSION: ICD-10-CM

## 2024-11-21 DIAGNOSIS — I71.20 THORACIC AORTIC ANEURYSM WITHOUT RUPTURE, UNSPECIFIED PART: Primary | ICD-10-CM

## 2024-11-21 DIAGNOSIS — Z87.891 FORMER SMOKER: ICD-10-CM

## 2024-11-21 DIAGNOSIS — R94.30 CARDIAC LV EJECTION FRACTION OF 40-49%: ICD-10-CM

## 2024-11-21 DIAGNOSIS — I50.22 CHRONIC SYSTOLIC CONGESTIVE HEART FAILURE: ICD-10-CM

## 2024-11-21 DIAGNOSIS — R93.1 DECREASED CARDIAC EJECTION FRACTION: ICD-10-CM

## 2024-11-21 PROCEDURE — 99214 OFFICE O/P EST MOD 30 MIN: CPT | Mod: 95,,, | Performed by: INTERNAL MEDICINE

## 2024-11-21 NOTE — PATIENT INSTRUCTIONS
Assessment/Plan:  Noy Bourne is a 44 y.o. male with HTN, RLE DVT (IVC filter in place), thoracic aortic aneurysm s/p repair and CVA, seizures, smoking, who presents for a follow up appointment.      1. Thoracic aortic aneurysm s/p repair-  CTA Chest/Abd/Pelvis on 8/7/2024 revealed postop change aortic stent graft.  Soft tissue abuts the proximal descending thoracic aorta though similar.  Multiple collateral vessels noted particular in the anterior chest. Left jugular and subclavian vein are not well visualized. No thrombus seen. This may represent chronic occlusion. 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 Check CTA neck and repeat CTA chest/abd/pelvis to further characterize the areas of resume chronic occlusion.  This will help to determine if full-dose anticoagulation is indicated, as no thrombus was seen on current imaging.    2. History of CVA- Mechanism of stroke unknown.  Continue ASA, statin, beta blocker.    3. Depressed EF- Now resolved.  Echo on 11/23/2022 revealed EF 58% (vs 45% in 2021); normal LV diastolic function; no valvular abnormalities; normal central venous pressure (3 mmHg); estimated PA systolic pressure is 19 mmHg.  Continue losartan 100 mg daily and metoprolol succinate 50 mg daily.  Check updated echo to evalutae LVEF.     4. HTN- Continue current medications.        5. Smoking- Encourage smoking cessation.    Will call pt with results of CTA neck and repeat CTA chest/abd/pelvis  Otherwise, follow up in 3 months
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
## 2024-11-22 ENCOUNTER — TELEPHONE (OUTPATIENT)
Dept: CARDIOLOGY | Facility: CLINIC | Age: 44
End: 2024-11-22
Payer: MEDICARE

## 2024-11-22 NOTE — TELEPHONE ENCOUNTER
----- Message from Anton Hickman MD PhD sent at 11/21/2024  5:54 PM CST -----  Please schedule CTA neck and CTA chest/abd/pelvis to be done now.  Follow up in 3 months.    Thank you

## 2024-11-26 DIAGNOSIS — I69.320 CVA, OLD, APHASIA: ICD-10-CM

## 2024-11-26 RX ORDER — CLOPIDOGREL BISULFATE 75 MG/1
75 TABLET ORAL DAILY
Qty: 90 TABLET | Refills: 1 | Status: SHIPPED | OUTPATIENT
Start: 2024-11-26 | End: 2025-05-25

## 2024-12-02 NOTE — TELEPHONE ENCOUNTER
Patient scheduled.    Future Appointments   Date Time Provider Department Center   12/8/2024  9:00 AM [facility] [address] [facility]   12/8/2024  9:30 AM [facility] [address] [facility]   12/17/2024  1:40 PM Klaus Dejesus MD [facility]   2/4/2025  8:20 AM [facility]ickman MD [facility]

## 2024-12-08 ENCOUNTER — HOSPITAL ENCOUNTER (OUTPATIENT)
Dept: RADIOLOGY | Facility: HOSPITAL | Age: 44
Discharge: HOME | End: 2024-12-08
Attending: INTERNAL MEDICINE
Payer: MEDICARE

## 2024-12-08 DIAGNOSIS — I69.320 CVA, OLD, APHASIA: ICD-10-CM

## 2024-12-08 DIAGNOSIS — I71.20 THORACIC AORTIC ANEURYSM WITHOUT RUPTURE, UNSPECIFIED PART: ICD-10-CM

## 2024-12-08 DIAGNOSIS — I71.9 PSEUDOANEURYSM OF AORTA: ICD-10-CM

## 2024-12-08 PROCEDURE — 74174 CTA ABD&PLVS W/CONTRAST: CPT | Mod: TC

## 2024-12-08 PROCEDURE — 71275 CT ANGIOGRAPHY CHEST: CPT | Mod: 26,,, | Performed by: RADIOLOGY

## 2024-12-08 PROCEDURE — 70498 CT ANGIOGRAPHY NECK: CPT | Mod: TC

## 2024-12-08 PROCEDURE — 70498 CT ANGIOGRAPHY NECK: CPT | Mod: 26,,, | Performed by: RADIOLOGY

## 2024-12-08 PROCEDURE — 74174 CTA ABD&PLVS W/CONTRAST: CPT | Mod: 26,,, | Performed by: RADIOLOGY

## 2024-12-08 PROCEDURE — 25500020 PHARM REV CODE 255: Performed by: INTERNAL MEDICINE

## 2024-12-08 RX ADMIN — IOHEXOL 150 ML: 350 INJECTION, SOLUTION INTRAVENOUS at 10:12

## 2024-12-11 LAB
CREAT SERPL-MCNC: 1.1 MG/DL (ref 0.5–1.4)
SAMPLE: NORMAL

## 2024-12-12 ENCOUNTER — HOSPITAL ENCOUNTER (EMERGENCY)
Facility: HOSPITAL | Age: 44
Discharge: HOME | End: 2024-12-12
Attending: EMERGENCY MEDICINE
Payer: MEDICARE

## 2024-12-12 ENCOUNTER — TELEPHONE (OUTPATIENT)
Dept: CARDIOLOGY | Facility: CLINIC | Age: 44
End: 2024-12-12
Payer: MEDICARE

## 2024-12-12 VITALS
HEART RATE: 66 BPM | BODY MASS INDEX: 28.88 KG/M2 | TEMPERATURE: 98 F | OXYGEN SATURATION: 96 % | HEIGHT: 69 IN | WEIGHT: 195 LBS | SYSTOLIC BLOOD PRESSURE: 121 MMHG | DIASTOLIC BLOOD PRESSURE: 71 MMHG | RESPIRATION RATE: 16 BRPM

## 2024-12-12 VITALS
TEMPERATURE: 98 F | HEART RATE: 69 BPM | WEIGHT: 195 LBS | HEIGHT: 69 IN | DIASTOLIC BLOOD PRESSURE: 76 MMHG | OXYGEN SATURATION: 97 % | SYSTOLIC BLOOD PRESSURE: 130 MMHG | RESPIRATION RATE: 18 BRPM | BODY MASS INDEX: 28.88 KG/M2

## 2024-12-12 DIAGNOSIS — R04.0 EPISTAXIS: Primary | ICD-10-CM

## 2024-12-12 LAB
ANION GAP SERPL CALC-SCNC: 9 MMOL/L (ref 8–16)
BASOPHILS # BLD AUTO: 0.05 K/UL (ref 0–0.2)
BASOPHILS NFR BLD: 0.8 % (ref 0–1.9)
BUN SERPL-MCNC: 17 MG/DL (ref 6–20)
BUN SERPL-MCNC: 17 MG/DL (ref 6–30)
CALCIUM SERPL-MCNC: 9.5 MG/DL (ref 8.7–10.5)
CHLORIDE SERPL-SCNC: 104 MMOL/L (ref 95–110)
CHLORIDE SERPL-SCNC: 107 MMOL/L (ref 95–110)
CO2 SERPL-SCNC: 26 MMOL/L (ref 23–29)
CREAT SERPL-MCNC: 1.2 MG/DL (ref 0.5–1.4)
CREAT SERPL-MCNC: 1.3 MG/DL (ref 0.5–1.4)
DIFFERENTIAL METHOD BLD: ABNORMAL
EOSINOPHIL # BLD AUTO: 0.1 K/UL (ref 0–0.5)
EOSINOPHIL NFR BLD: 1.7 % (ref 0–8)
ERYTHROCYTE [DISTWIDTH] IN BLOOD BY AUTOMATED COUNT: 12.8 % (ref 11.5–14.5)
EST. GFR  (NO RACE VARIABLE): >60 ML/MIN/1.73 M^2
GLUCOSE SERPL-MCNC: 80 MG/DL (ref 70–110)
GLUCOSE SERPL-MCNC: 80 MG/DL (ref 70–110)
HCT VFR BLD AUTO: 45 % (ref 40–54)
HCT VFR BLD CALC: 46 %PCV (ref 36–54)
HCV AB SERPL QL IA: NORMAL
HGB BLD-MCNC: 16.2 G/DL (ref 14–18)
HIV 1+2 AB+HIV1 P24 AG SERPL QL IA: NORMAL
IMM GRANULOCYTES # BLD AUTO: 0.01 K/UL (ref 0–0.04)
IMM GRANULOCYTES NFR BLD AUTO: 0.2 % (ref 0–0.5)
LYMPHOCYTES # BLD AUTO: 2.5 K/UL (ref 1–4.8)
LYMPHOCYTES NFR BLD: 41.3 % (ref 18–48)
MCH RBC QN AUTO: 34.6 PG (ref 27–31)
MCHC RBC AUTO-ENTMCNC: 36 G/DL (ref 32–36)
MCV RBC AUTO: 96 FL (ref 82–98)
MONOCYTES # BLD AUTO: 0.6 K/UL (ref 0.3–1)
MONOCYTES NFR BLD: 9.1 % (ref 4–15)
NEUTROPHILS # BLD AUTO: 2.9 K/UL (ref 1.8–7.7)
NEUTROPHILS NFR BLD: 46.9 % (ref 38–73)
NRBC BLD-RTO: 0 /100 WBC
PLATELET # BLD AUTO: 130 K/UL (ref 150–450)
PMV BLD AUTO: 10.4 FL (ref 9.2–12.9)
POC IONIZED CALCIUM: 1.17 MMOL/L (ref 1.06–1.42)
POC TCO2 (MEASURED): 26 MMOL/L (ref 23–29)
POTASSIUM BLD-SCNC: 3.7 MMOL/L (ref 3.5–5.1)
POTASSIUM SERPL-SCNC: 3.9 MMOL/L (ref 3.5–5.1)
RBC # BLD AUTO: 4.68 M/UL (ref 4.6–6.2)
SAMPLE: NORMAL
SODIUM BLD-SCNC: 143 MMOL/L (ref 136–145)
SODIUM SERPL-SCNC: 142 MMOL/L (ref 136–145)
WBC # BLD AUTO: 6.06 K/UL (ref 3.9–12.7)

## 2024-12-12 PROCEDURE — 99283 EMERGENCY DEPT VISIT LOW MDM: CPT

## 2024-12-12 PROCEDURE — 87389 HIV-1 AG W/HIV-1&-2 AB AG IA: CPT | Performed by: PHYSICIAN ASSISTANT

## 2024-12-12 PROCEDURE — 80047 BASIC METABLC PNL IONIZED CA: CPT

## 2024-12-12 PROCEDURE — 86803 HEPATITIS C AB TEST: CPT | Performed by: PHYSICIAN ASSISTANT

## 2024-12-12 PROCEDURE — 80048 BASIC METABOLIC PNL TOTAL CA: CPT | Performed by: EMERGENCY MEDICINE

## 2024-12-12 PROCEDURE — 85025 COMPLETE CBC W/AUTO DIFF WBC: CPT | Performed by: EMERGENCY MEDICINE

## 2024-12-12 PROCEDURE — 99282 EMERGENCY DEPT VISIT SF MDM: CPT | Mod: 27
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
## 2024-12-12 NOTE — Clinical Note
"Noy"Thomas Bourne was seen and treated in our emergency department on 12/12/2024.  He may return to work on 12/12/2024.       If you have any questions or concerns, please don't hesitate to call.      Asiya Aquino RN    "

## 2024-12-12 NOTE — TELEPHONE ENCOUNTER
----- Message from Yue sent at 12/12/2024  9:11 AM CST -----  Regarding: Bleed  Patient wife Madina calling to speak with nurse because he's having a noise bleed. Please call back @ [phone]. Thank you Yue

## 2024-12-12 NOTE — Clinical Note
"Noy"Thomas Bourne was seen and treated in our emergency department on 12/12/2024.  He may return to work on 12/16/2024.  Please excuse Noy Bourne from work on 12/12/2024. They were seen at [facility] for an emergency medical condition. They are safe to return to work on 12/16 or until symptoms resolve. Please reach out if you need any additional clarification; we will provide as much information as appropriate without violating privacy.     Thank you     If you have any questions or concerns, please don't hesitate to call.      Дмитрий Rosen MD"

## 2024-12-12 NOTE — ED NOTES
Pt presented with complaint of a nosebleed from left nostril that started at 0400.  Not actively bleeding at this time.  Stated he was passing clots.  Pt alert and oriented x 3.  Pt takes Plavix due to a previous heart valve surgery.

## 2024-12-13 ENCOUNTER — TELEPHONE (OUTPATIENT)
Dept: ADMINISTRATIVE | Facility: CLINIC | Age: 44
End: 2024-12-13
Payer: MEDICARE

## 2024-12-13 ENCOUNTER — TELEPHONE (OUTPATIENT)
Dept: CARDIOLOGY | Facility: CLINIC | Age: 44
End: 2024-12-13
Payer: MEDICARE

## 2024-12-13 DIAGNOSIS — I77.9 DISORDER OF ARTERIES AND ARTERIOLES, UNSPECIFIED: Primary | ICD-10-CM

## 2024-12-13 NOTE — SUBJECTIVE & OBJECTIVE
(Not in a hospital admission)      Review of patient's allergies indicates:  No Known Allergies    Past Medical History:   Diagnosis Date    DVT (deep venous thrombosis)     High cholesterol     Hypertension     Seizures     Stroke      Past Surgical History:   Procedure Laterality Date    ABDOMINAL SURGERY      AORTIC ARCH REPAIR      AORTIC VALVE SURGERY      DIAPHRAGM SURGERY       Family History       Problem Relation (Age of Onset)    Cancer Father    Diabetes Mother    Hypertension Mother          Tobacco Use    Smoking status: Every Day     Current packs/day: 0.00     Types: Cigarettes     Last attempt to quit: 5/7/2021     Years since quitting: 3.6    Smokeless tobacco: Never   Substance and Sexual Activity    Alcohol use: Yes     Alcohol/week: 2.0 standard drinks of alcohol     Types: 2 Cans of beer per week     Comment: occassional    Drug use: Yes     Types: Marijuana     Comment: 2 weeks ago    Sexual activity: Not on file     Review of Systems   Constitutional:  Negative for activity change and appetite change.   HENT:  Positive for nosebleeds.    Respiratory:  Negative for cough, chest tightness and shortness of breath.    Cardiovascular:  Negative for chest pain.   Gastrointestinal:  Negative for abdominal pain.     Objective:     Vital Signs (Most Recent):  Temp: 98.3 °F (36.8 °C) (12/12/24 2030)  Pulse: 61 (12/12/24 2030)  Resp: 16 (12/12/24 2030)  BP: 124/66 (12/12/24 2030)  SpO2: 96 % (12/12/24 2030) Vital Signs (24h Range):  Temp:  [97.6 °F (36.4 °C)-98.3 °F (36.8 °C)] 98.3 °F (36.8 °C)  Pulse:  [61-69] 61  Resp:  [16-18] 16  SpO2:  [96 %-98 %] 96 %  BP: (124-134)/(66-77) 124/66     Weight: 88.5 kg (195 lb)  Body mass index is 28.8 kg/m².      Physical Exam  HENT:      Head: Normocephalic.   Cardiovascular:      Rate and Rhythm: Normal rate.      Pulses:           Radial pulses are 2+ on the right side and 2+ on the left side.   Pulmonary:      Effort: Pulmonary effort is normal.   Skin:      General: Skin is warm.   Neurological:      Mental Status: He is alert.          Significant Labs:  All pertinent labs from the last 24 hours have been reviewed.    Significant Diagnostics:  I have reviewed all pertinent imaging results/findings within the past 24 hours.

## 2024-12-13 NOTE — PROGRESS NOTES
Call placed to Pt to f/u from his recent ED visit and spoke with his wife. Message sent to Cardiologist to reach out to Pt or wife who is requesting orders for imaging of his Aorta which she states has ruptured before. She states this was supposed to be done on his ED visit and was not.

## 2024-12-13 NOTE — ED NOTES
I-STAT Chem-8+ Results:   Value Reference Range   Sodium 143 136-145 mmol/L   Potassium  3.7 3.5-5.1 mmol/L   Chloride 104  mmol/L   Ionized Calcium 1.17 1.06-1.42 mmol/L   CO2 (measured) 26 23-29 mmol/L   Glucose 80  mg/dL   BUN 17 6-30 mg/dL   Creatinine 1.3 0.5-1.4 mg/dL   Hematocrit 46 36-54%
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
## 2024-12-13 NOTE — DISCHARGE INSTRUCTIONS
Diagnosis: Nosebleed    Home Care Instructions:  - WE SPOKE WITH VASCULAR SURGERY WHO RECOMMENDED THAT YOU STOP TAKING YOUR PLAVIX AND CONTINUE TAKING YOUR ASPIRIN. THIS SHOULD HELP TO PREVENT FURTHER BLEEDING EPISODES  - When you develop a nosebleed, pinch your nose for 15 minutes without stopping. Lean FORWARD (not backward) while you do this. Use a nose clip, if available.  - Use Afirin if you begin bleeding again: To spray, hold bottle with thumb at base and nozzle between first and second fingers. Fully depress rim with a firm, even stroke and sniff deeply. Do not tilt head backward while spraying. Spray 2 or 3 times in each nostril, but not more often than every 10 to 12 hours.  - If you continue to have bleeding even after these measures, you must return to the emergency department.    .Follow-up plan:  · Follow-up with: Primary care doctor within 3 - 5  days  · Follow-up for additional testing and/or evaluation as directed by your primary doctor    Return to the emergency department for symptoms including but not limited to: worsening symptoms, shortness of breath or chest pain, vomiting with inability to hold down fluids, vomiting or pooping blood, fevers greater than 100.4°F, dizziness, passing out/fainting/unconsciousness, or other concerning symptoms.

## 2024-12-13 NOTE — ASSESSMENT & PLAN NOTE
44-year-old gentleman, history of aortic injury status post open repair, endovascular stent graft in 2018, presenting with a left nosebleed on Plavix.    - Imaging from 4 days prior reviewed, no imaging findings to suggest development of aortoenteric or tracheo aortic fistula.  - May deescalate from Plavix to aspirin monotherapy to assist with decreased bleeding from nosebleed.  - If truly has episodes of hemoptysis, should re-presented to the ED with stat CTA

## 2024-12-13 NOTE — ED PROVIDER NOTES
Encounter Date: 12/12/2024       History     Chief Complaint   Patient presents with    Epistaxis     Pt's cardiologist referred pt to ER after nose bleed early this morning pt was throwing up clots after, pt had recent CT of neck.     HEMANT Bourne is a 44 y.o. male w/ a PMHx significant for thoracic aortic pseudoaneurysm s/p repair in 2018, DVT, CVA who presents to the ED per request of his cardiologist Dr. Hickman for evaluation by vascular surgery over concerns of possible graft complications. These concerns arose due to an episode of epistaxis occurring this morning, which lasted a few hours.  Patient states that he had a significant amount of bleeding from his nares and also coughed up a number of large clots.  He was initially seen at [place] ED, but by that time the bleeding had stopped and he was discharged home.  After this visit, patient contacted his cardiologist, Dr. Hickman, who urge the patient to come to [facility] ED for further workup and consultation w/ vascular/Cardiothoracic surgery.  Regarding patient's nosebleed, he has had no additional bleeding since this morning.       Review of patient's allergies indicates:  No Known Allergies  Past Medical History:   Diagnosis Date    DVT (deep venous thrombosis)     High cholesterol     Hypertension     Seizures     Stroke      Past Surgical History:   Procedure Laterality Date    ABDOMINAL SURGERY      AORTIC ARCH REPAIR      AORTIC VALVE SURGERY      DIAPHRAGM SURGERY       Family History   Problem Relation Name Age of Onset    Hypertension Mother      Diabetes Mother      Cancer Father       Social History     Tobacco Use    Smoking status: Every Day     Current packs/day: 0.00     Types: Cigarettes     Last attempt to quit: 5/7/2021     Years since quitting: 3.6    Smokeless tobacco: Never   Substance Use Topics    Alcohol use: Yes     Alcohol/week: 2.0 standard drinks of alcohol     Types: 2 Cans of beer per week     Comment: occassional     Drug use: Yes     Types: Marijuana     Comment: 2 weeks ago     Review of Systems    Physical Exam     Initial Vitals [12/12/24 1724]   BP Pulse Resp Temp SpO2   134/77 67 16 98 °F (36.7 °C) 98 %      MAP       --         Physical Exam    Nursing note and vitals reviewed.  Constitutional: He appears well-developed and well-nourished. No distress.   HENT:   Head: Normocephalic and atraumatic.   Nose: No mucosal edema, nose lacerations, sinus tenderness, nasal deformity, septal deviation or nasal septal hematoma. Right sinus exhibits no maxillary sinus tenderness and no frontal sinus tenderness. Left sinus exhibits no maxillary sinus tenderness and no frontal sinus tenderness. Mouth/Throat: Oropharynx is clear and moist and mucous membranes are normal. No oral lesions. No uvula swelling or lacerations.   Small amount of dried blood in bilateral nares.  No active bleeding.   Eyes: Conjunctivae and EOM are normal.   Neck:   Normal range of motion.  Cardiovascular:  Normal rate, normal heart sounds and intact distal pulses.           Pulmonary/Chest: Breath sounds normal. No respiratory distress.   Abdominal: Abdomen is soft. Bowel sounds are normal. He exhibits no distension and no mass. There is no abdominal tenderness.   Musculoskeletal:         General: No tenderness or edema. Normal range of motion.      Cervical back: Normal range of motion.     Neurological: He is alert and oriented to person, place, and time. He has normal strength. No sensory deficit.   Skin: Skin is warm and dry. Capillary refill takes less than 2 seconds.   Psychiatric: He has a normal mood and affect. His behavior is normal.         ED Course   Procedures  Labs Reviewed   CBC W/ AUTO DIFFERENTIAL - Abnormal       Result Value    WBC 6.06      RBC 4.68      Hemoglobin 16.2      Hematocrit 45.0      MCV 96      MCH 34.6 (*)     MCHC 36.0      RDW 12.8      Platelets 130 (*)     MPV 10.4      Immature Granulocytes 0.2      Gran # (ANC) 2.9       Immature Grans (Abs) 0.01      Lymph # 2.5      Mono # 0.6      Eos # 0.1      Baso # 0.05      nRBC 0      Gran % 46.9      Lymph % 41.3      Mono % 9.1      Eosinophil % 1.7      Basophil % 0.8      Differential Method Automated     HEPATITIS C ANTIBODY    Hepatitis C Ab Non-reactive      Narrative:     Release to patient->Immediate   HIV 1 / 2 ANTIBODY    HIV 1/2 Ag/Ab Non-reactive      Narrative:     Release to patient->Immediate   BASIC METABOLIC PANEL    Sodium 142      Potassium 3.9      Chloride 107      CO2 26      Glucose 80      BUN 17      Creatinine 1.2      Calcium 9.5      Anion Gap 9      eGFR >60.0     ISTAT PROCEDURE    POC Glucose 80      POC BUN 17      POC Creatinine 1.3      POC Sodium 143      POC Potassium 3.7      POC Chloride 104      POC TCO2 (MEASURED) 26      POC Ionized Calcium 1.17      POC Hematocrit 46      Sample JIM     ISTAT CHEM8          Imaging Results    None          Medications - No data to display  Medical Decision Making  Noy Bourne is a 44 y.o. male who presents to the emergency department per the request of his cardiologist for evaluation by vascular surgery over concerns of possible graft complications after experiencing epistaxis event this morning. On initial evaluation, patient in NAD and VSS.  EKG shows normal sinus rhythm w/ a rate of 64. Patient is noted to have dried blood in bilateral nares, but no active bleeding.  Oropharynx clear w/o evidence of bleeding.  Heart and lungs clear to auscultation.  No abdominal tenderness to palpation, masses, or other abnl findings.  Distal pulses equal and intact bilaterally.  Otherwise, physical exam WNL.  Vascular surgery consulted at this time.  After reviewing imaging and seen patient at bedside, vascular surgery recommended that patient discontinue taking his Plavix and resume only ASA in order to help prevent additional episodes of epistaxis.  No additional imaging deemed necessary at that time per vascular  surgery.  On multiple re-evaluations, patient has no significant change in clinical status or VS.     Pertinent Labs:  CBC w/o evidence of anemia, significantly reducing concerns for clinically significant blood loss.  I-STAT w/o evidence of gross metabolic derangement.  BNP w/o evidence of gross metabolic derangement.    Pertinent Imaging:  None indicated     Ddx including, but not limited to: Coagulopathy v. trauma v. nasal tumor v. angiofibroma v. hemophilia v. AV malformation v. aortoenteric fistula      Most likely Dx:  At this time, I have highest suspicion for patient's epistaxis being a result from likely mild nasal trauma or irritation in conjunction w/ his medication use increasing risk of bleeding.     Disposition:   Patient discharged home. Discussed strict return precautions and home care plan with patient and/or caregiver who expressed understanding and agreement.    Please see HPI, physical exam, ED course for additional details.    Amount and/or Complexity of Data Reviewed  Labs: ordered.              Attending Attestation:   Physician Attestation Statement for Resident:  As the supervising MD   Physician Attestation Statement: I have personally seen and examined this patient.   I agree with the above history.  -: 44-year-old male with a history of traumatic aortic injury status post open repair with graft (2018) referred by Cardiology for assessment of epistaxis.  Epistaxis earlier this morning was transient and quickly resolved.  Discharge from the ER after it was not present in the ER earlier today.  Subsequently spoke with Dr. Hickman of Cardiology who recommended he go to the ER for CT imaging.  Thoracic surgery consulted to discuss particular imaging study of concern to evaluate for fistula.  They evaluated the patient and saw no evidence of fistula and recommended no additional imaging.  He remained hemodynamically stable in the ER without any epistaxis.  Appreciate the consult.   As the  supervising MD I agree with the above PE.     As the supervising MD I agree with the above treatment, course, plan, and disposition.                                           Clinical Impression:  Final diagnoses:  [R04.0] Epistaxis (Primary)          ED Disposition Condition    Discharge Stable          ED Prescriptions    None       Follow-up Information    None          Дмитрий Rosen MD  Resident  12/12/24 5856       Servando Johnson MD  12/12/24 0178

## 2024-12-13 NOTE — TELEPHONE ENCOUNTER
Left vm for pt's wife that the ED looked at the most recent imaging and reduced the plavix. Dr. Hickman will call patient when done with clinic.
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
## 2024-12-13 NOTE — TELEPHONE ENCOUNTER
----- Message from Nurse Jules sent at 12/13/2024 12:41 PM CST -----  Please reach out to Pt or wife who is requesting orders for imaging of his Aorta which she states has ruptured before. She states this was supposed to be done on his ED visit and was not. Thank you.

## 2024-12-13 NOTE — ED TRIAGE NOTES
Patient states he woke up this morning to get dressed to go to work when his nose started bleeding. Patient states he went to [facility] and they released him, states he wants a second opinion. Pt states he is on plavix daily.

## 2025-01-13 ENCOUNTER — TELEPHONE (OUTPATIENT)
Dept: VASCULAR SURGERY | Facility: CLINIC | Age: 45
End: 2025-01-13
Payer: MEDICARE

## 2025-01-13 NOTE — TELEPHONE ENCOUNTER
"Pt noted to have missed CTA appt this am.Spoke with pt's spouse who verbalized "he must've forgot and so did I.When he calls me I'll call you to reschedule the CT". Informed her that the CTA needed to be done priot to appt 1/15/25 or appt will need to be rescheduled. Pt's spouse verbalized understanding of information received but reiterated "I'll call you back".  "

## 2025-01-14 ENCOUNTER — TELEPHONE (OUTPATIENT)
Dept: VASCULAR SURGERY | Facility: CLINIC | Age: 45
End: 2025-01-14
Payer: MEDICARE

## 2025-01-14 NOTE — TELEPHONE ENCOUNTER
Spoke with the pt's wife in reference to f/u appt.CTA rescheduled to next week and pt will contact the clinic to schedule f/u appt with Dr Ridley due to work schedule.Pt's wife verbalized  understanding of information received.

## 2025-02-17 DIAGNOSIS — I69.320 CVA, OLD, APHASIA: ICD-10-CM

## 2025-02-18 RX ORDER — CLOPIDOGREL BISULFATE 75 MG/1
75 TABLET ORAL DAILY
Qty: 30 TABLET | Refills: 0 | Status: SHIPPED | OUTPATIENT
Start: 2025-02-18 | End: 2025-03-20

## 2025-04-25 DIAGNOSIS — I50.22 CHRONIC SYSTOLIC CONGESTIVE HEART FAILURE: ICD-10-CM

## 2025-04-25 DIAGNOSIS — I69.320 CVA, OLD, APHASIA: ICD-10-CM

## 2025-04-25 DIAGNOSIS — I10 ESSENTIAL HYPERTENSION: ICD-10-CM

## 2025-04-28 RX ORDER — METOPROLOL SUCCINATE 50 MG/1
50 TABLET, EXTENDED RELEASE ORAL DAILY
Qty: 90 TABLET | Refills: 3 | Status: SHIPPED | OUTPATIENT
Start: 2025-04-28 | End: 2026-04-28

## 2025-04-28 RX ORDER — LOSARTAN POTASSIUM 50 MG/1
50 TABLET ORAL DAILY
Qty: 90 TABLET | Refills: 3 | Status: SHIPPED | OUTPATIENT
Start: 2025-04-28 | End: 2026-04-23

## 2025-04-28 RX ORDER — ATORVASTATIN CALCIUM 80 MG/1
80 TABLET, FILM COATED ORAL DAILY
Qty: 90 TABLET | Refills: 3 | Status: SHIPPED | OUTPATIENT
Start: 2025-04-28 | End: 2026-04-28

## 2025-04-28 RX ORDER — HYDROCHLOROTHIAZIDE 12.5 MG/1
12.5 CAPSULE ORAL DAILY
Qty: 90 CAPSULE | Refills: 3 | Status: SHIPPED | OUTPATIENT
Start: 2025-04-28 | End: 2026-04-28

## 2025-08-18 ENCOUNTER — ON-DEMAND VIRTUAL (OUTPATIENT)
Dept: URGENT CARE | Facility: CLINIC | Age: 45
End: 2025-08-18
Payer: MEDICARE

## 2025-08-18 DIAGNOSIS — G40.909 SEIZURE DISORDER: Primary | ICD-10-CM

## 2025-08-18 PROCEDURE — 98005 SYNCH AUDIO-VIDEO EST LOW 20: CPT | Mod: 95,,, | Performed by: NURSE PRACTITIONER

## 2025-08-18 RX ORDER — LEVETIRACETAM 500 MG/1
1000 TABLET, EXTENDED RELEASE ORAL NIGHTLY
Qty: 60 TABLET | Refills: 1 | Status: SHIPPED | OUTPATIENT
Start: 2025-08-18 | End: 2025-10-17

## 2025-08-21 ENCOUNTER — TELEPHONE (OUTPATIENT)
Dept: NEUROLOGY | Facility: CLINIC | Age: 45
End: 2025-08-21
Payer: MEDICARE

## 2025-08-22 ENCOUNTER — TELEPHONE (OUTPATIENT)
Facility: CLINIC | Age: 45
End: 2025-08-22
Payer: MEDICARE